# Patient Record
Sex: FEMALE | Race: WHITE | NOT HISPANIC OR LATINO | Employment: OTHER | URBAN - METROPOLITAN AREA
[De-identification: names, ages, dates, MRNs, and addresses within clinical notes are randomized per-mention and may not be internally consistent; named-entity substitution may affect disease eponyms.]

---

## 2017-01-18 ENCOUNTER — CONVERSION ENCOUNTER (OUTPATIENT)
Dept: MAMMOGRAPHY | Facility: HOSPITAL | Age: 72
End: 2017-01-18

## 2018-03-30 ENCOUNTER — CONVERSION ENCOUNTER (OUTPATIENT)
Dept: PODIATRY | Facility: CLINIC | Age: 73
End: 2018-03-30

## 2018-10-09 ENCOUNTER — CONVERSION ENCOUNTER (OUTPATIENT)
Dept: MAMMOGRAPHY | Facility: HOSPITAL | Age: 73
End: 2018-10-09

## 2019-01-11 ENCOUNTER — HOSPITAL ENCOUNTER (OUTPATIENT)
Dept: LAB | Facility: HOSPITAL | Age: 74
Discharge: HOME | End: 2019-01-11
Attending: INTERNAL MEDICINE

## 2019-01-11 LAB
APPEARANCE UR: CLEAR
BILIRUB UR QL: NEGATIVE
COLOR UR: YELLOW
CONV BACTERIA: ABNORMAL
CONV COLLECTION SOURCE (UA): ABNORMAL
CONV HYALINE CASTS IN URINE MICRO: ABNORMAL /[LPF]
CONV UROBILINOGEN IN URINE BY AUTOMATED TEST STRIP: 0.2 {EHRLICHU}/DL (ref 0.1–1)
GLUCOSE UR QL: NEGATIVE MG/DL
HGB UR QL STRIP: NEGATIVE
KETONES UR QL STRIP: ABNORMAL MG/DL
LEUKOCYTE ESTERASE UR QL STRIP: ABNORMAL
NITRITE UR QL STRIP: POSITIVE
PH UR STRIP.AUTO: 5.5 [PH] (ref 5–8)
PROT UR QL: NEGATIVE MG/DL
RBC #/AREA URNS HPF: ABNORMAL /[HPF]
SP GR UR: 1.02 (ref 1–1.03)
SQUAMOUS SPT QL MICRO: ABNORMAL /[HPF]
WBC #/AREA URNS HPF: ABNORMAL /[HPF]

## 2019-01-13 LAB
AMOXICILLIN+CLAV SUSC ISLT: 4
AMPICILLIN SUSC ISLT: >=32
AMPICILLIN+SULBAC SUSC ISLT: 16
BACTERIA UR CULT: ABNORMAL
CEFAZOLIN SUSC ISLT: <=4
CEFEPIME SUSC ISLT: <=1
CEFTAZIDIME SUSC ISLT: <=1
CEFTRIAXONE SUSC ISLT: <=1
CEFUROXIME ORAL SUSC ISLT: 4
CEFUROXIME PARENTER SUSC ISLT: 4
CIPROFLOXACIN SUSC ISLT: >=4
ERTAPENEM SUSC ISLT: <=0.5
GENTAMICIN SUSC ISLT: <=1
LEVOFLOXACIN SUSC ISLT: >=8
NITROFURANTOIN SUSC ISLT: <=16
TETRACYCLINE SUSC ISLT: <=1
TMP SMX SUSC ISLT: >=320
TOBRAMYCIN SUSC ISLT: <=1

## 2019-01-23 ENCOUNTER — HOSPITAL ENCOUNTER (OUTPATIENT)
Dept: LAB | Facility: HOSPITAL | Age: 74
Discharge: HOME | End: 2019-01-23
Attending: INTERNAL MEDICINE

## 2019-01-23 LAB
ALBUMIN SERPL-MCNC: 4.2 G/DL (ref 3.5–5)
ALBUMIN/GLOB SERPL: 1.3 {RATIO} (ref 1.4–2.6)
ALP SERPL-CCNC: 66 U/L (ref 43–160)
ALT SERPL-CCNC: 16 U/L (ref 10–40)
ANION GAP SERPL CALC-SCNC: 17 MMOL/L (ref 8–19)
AST SERPL-CCNC: 20 U/L (ref 15–50)
BASOPHILS # BLD AUTO: 0.07 10*3/UL (ref 0–0.2)
BASOPHILS NFR BLD AUTO: 1.26 % (ref 0–3)
BILIRUB SERPL-MCNC: 0.34 MG/DL (ref 0.2–1.3)
BUN SERPL-MCNC: 16 MG/DL (ref 5–25)
BUN/CREAT SERPL: 24 {RATIO} (ref 6–20)
CALCIUM SERPL-MCNC: 9.9 MG/DL (ref 8.7–10.4)
CHLORIDE SERPL-SCNC: 100 MMOL/L (ref 99–111)
CHOLEST SERPL-MCNC: 199 MG/DL (ref 107–200)
CHOLEST/HDLC SERPL: 3.4 {RATIO} (ref 3–6)
CONV CO2: 23 MMOL/L (ref 22–32)
CONV TOTAL PROTEIN: 7.5 G/DL (ref 6.3–8.2)
CREAT UR-MCNC: 0.66 MG/DL (ref 0.5–0.9)
EOSINOPHIL # BLD AUTO: 0.18 10*3/UL (ref 0–0.7)
EOSINOPHIL # BLD AUTO: 3.36 % (ref 0–7)
ERYTHROCYTE [DISTWIDTH] IN BLOOD BY AUTOMATED COUNT: 11.7 % (ref 11.5–14.5)
FOLATE SERPL-MCNC: 12.1 NG/ML (ref 4.8–20)
GFR SERPLBLD BASED ON 1.73 SQ M-ARVRAT: >60 ML/MIN/{1.73_M2}
GLOBULIN UR ELPH-MCNC: 3.3 G/DL (ref 2–3.5)
GLUCOSE SERPL-MCNC: 93 MG/DL (ref 65–99)
HBA1C MFR BLD: 13 G/DL (ref 12–16)
HCT VFR BLD AUTO: 40.3 % (ref 37–47)
HDLC SERPL-MCNC: 59 MG/DL (ref 40–60)
LDLC SERPL CALC-MCNC: 120 MG/DL (ref 70–100)
LYMPHOCYTES # BLD AUTO: 1.73 10*3/UL (ref 1–5)
MCH RBC QN AUTO: 29.1 PG (ref 27–31)
MCHC RBC AUTO-ENTMCNC: 32.1 G/DL (ref 33–37)
MCV RBC AUTO: 90.6 FL (ref 81–99)
MONOCYTES # BLD AUTO: 0.6 10*3/UL (ref 0.2–1.2)
MONOCYTES NFR BLD AUTO: 11.4 % (ref 3–10)
NEUTROPHILS # BLD AUTO: 2.72 10*3/UL (ref 2–8)
NEUTROPHILS NFR BLD AUTO: 51.4 % (ref 30–85)
NRBC BLD AUTO-RTO: 0 % (ref 0–0.01)
OSMOLALITY SERPL CALC.SUM OF ELEC: 283 MOSM/KG (ref 273–304)
PLATELET # BLD AUTO: 275 10*3/UL (ref 130–400)
PMV BLD AUTO: 7.8 FL (ref 7.4–10.4)
POTASSIUM SERPL-SCNC: 4 MMOL/L (ref 3.5–5.3)
RBC # BLD AUTO: 4.46 10*6/UL (ref 4.2–5.4)
SODIUM SERPL-SCNC: 136 MMOL/L (ref 135–147)
TRIGL SERPL-MCNC: 102 MG/DL (ref 40–150)
VARIANT LYMPHS NFR BLD MANUAL: 32.6 % (ref 20–45)
VIT B12 SERPL-MCNC: 661 PG/ML (ref 211–911)
VLDLC SERPL-MCNC: 20 MG/DL (ref 5–37)
WBC # BLD AUTO: 5.3 10*3/UL (ref 4.8–10.8)

## 2019-01-24 LAB
25(OH)D3 SERPL-MCNC: 22.4 NG/ML (ref 30–100)
FERRITIN SERPL-MCNC: 50 NG/ML (ref 10–200)
IRON SATN MFR SERPL: 20 % (ref 20–55)
IRON SERPL-MCNC: 87 UG/DL (ref 60–170)
TIBC SERPL-MCNC: 426 UG/DL (ref 245–450)
TRANSFERRIN SERPL-MCNC: 298 MG/DL (ref 250–380)

## 2019-05-17 ENCOUNTER — HOSPITAL ENCOUNTER (OUTPATIENT)
Dept: GENERAL RADIOLOGY | Facility: HOSPITAL | Age: 74
Discharge: HOME | End: 2019-05-17
Attending: INTERNAL MEDICINE

## 2019-05-29 ENCOUNTER — HOSPITAL ENCOUNTER (OUTPATIENT)
Dept: LAB | Facility: HOSPITAL | Age: 74
Discharge: HOME | End: 2019-05-29
Attending: INTERNAL MEDICINE

## 2019-05-29 LAB
25(OH)D3 SERPL-MCNC: 23.3 NG/ML (ref 30–100)
ALBUMIN SERPL-MCNC: 4 G/DL (ref 3.5–5)
ALBUMIN/GLOB SERPL: 1.4 {RATIO} (ref 1.4–2.6)
ALP SERPL-CCNC: 66 U/L (ref 43–160)
ALT SERPL-CCNC: 18 U/L (ref 10–40)
ANION GAP SERPL CALC-SCNC: 16 MMOL/L (ref 8–19)
AST SERPL-CCNC: 21 U/L (ref 15–50)
BASOPHILS # BLD AUTO: 0.06 10*3/UL (ref 0–0.2)
BASOPHILS NFR BLD AUTO: 0.9 % (ref 0–3)
BILIRUB SERPL-MCNC: 0.37 MG/DL (ref 0.2–1.3)
BUN SERPL-MCNC: 19 MG/DL (ref 5–25)
BUN/CREAT SERPL: 23 {RATIO} (ref 6–20)
CALCIUM SERPL-MCNC: 9.5 MG/DL (ref 8.7–10.4)
CHLORIDE SERPL-SCNC: 104 MMOL/L (ref 99–111)
CHOLEST SERPL-MCNC: 200 MG/DL (ref 107–200)
CHOLEST/HDLC SERPL: 2.9 {RATIO} (ref 3–6)
CONV ABS IMM GRAN: 0.03 10*3/UL (ref 0–0.2)
CONV CO2: 25 MMOL/L (ref 22–32)
CONV IMMATURE GRAN: 0.4 % (ref 0–1.8)
CONV TOTAL PROTEIN: 6.9 G/DL (ref 6.3–8.2)
CREAT UR-MCNC: 0.81 MG/DL (ref 0.5–0.9)
DEPRECATED RDW RBC AUTO: 43.8 FL (ref 36.4–46.3)
EOSINOPHIL # BLD AUTO: 0.21 10*3/UL (ref 0–0.7)
EOSINOPHIL # BLD AUTO: 3.1 % (ref 0–7)
ERYTHROCYTE [DISTWIDTH] IN BLOOD BY AUTOMATED COUNT: 13.2 % (ref 11.7–14.4)
GFR SERPLBLD BASED ON 1.73 SQ M-ARVRAT: >60 ML/MIN/{1.73_M2}
GLOBULIN UR ELPH-MCNC: 2.9 G/DL (ref 2–3.5)
GLUCOSE SERPL-MCNC: 91 MG/DL (ref 65–99)
HBA1C MFR BLD: 11.8 G/DL (ref 12–16)
HCT VFR BLD AUTO: 38.4 % (ref 37–47)
HDLC SERPL-MCNC: 70 MG/DL (ref 40–60)
LDLC SERPL CALC-MCNC: 113 MG/DL (ref 70–100)
LYMPHOCYTES # BLD AUTO: 2.09 10*3/UL (ref 1–5)
MAGNESIUM SERPL-MCNC: 2.15 MG/DL (ref 1.6–2.3)
MCH RBC QN AUTO: 27.4 PG (ref 27–31)
MCHC RBC AUTO-ENTMCNC: 30.7 G/DL (ref 33–37)
MCV RBC AUTO: 89.1 FL (ref 81–99)
MONOCYTES # BLD AUTO: 0.69 10*3/UL (ref 0.2–1.2)
MONOCYTES NFR BLD AUTO: 10.3 % (ref 3–10)
NEUTROPHILS # BLD AUTO: 3.64 10*3/UL (ref 2–8)
NEUTROPHILS NFR BLD AUTO: 54.2 % (ref 30–85)
NRBC CBCN: 0 % (ref 0–0.7)
OSMOLALITY SERPL CALC.SUM OF ELEC: 294 MOSM/KG (ref 273–304)
PLATELET # BLD AUTO: 280 10*3/UL (ref 130–400)
PMV BLD AUTO: 10.2 FL (ref 9.4–12.3)
POTASSIUM SERPL-SCNC: 4.2 MMOL/L (ref 3.5–5.3)
RBC # BLD AUTO: 4.31 10*6/UL (ref 4.2–5.4)
SODIUM SERPL-SCNC: 141 MMOL/L (ref 135–147)
T4 FREE SERPL-MCNC: 1.1 NG/DL (ref 0.9–1.8)
TRIGL SERPL-MCNC: 84 MG/DL (ref 40–150)
TSH SERPL-ACNC: 2.55 M[IU]/L (ref 0.27–4.2)
VARIANT LYMPHS NFR BLD MANUAL: 31.1 % (ref 20–45)
VLDLC SERPL-MCNC: 17 MG/DL (ref 5–37)
WBC # BLD AUTO: 6.72 10*3/UL (ref 4.8–10.8)

## 2019-08-23 ENCOUNTER — HOSPITAL ENCOUNTER (OUTPATIENT)
Dept: LAB | Facility: HOSPITAL | Age: 74
Discharge: HOME | End: 2019-08-23
Attending: INTERNAL MEDICINE

## 2019-08-23 LAB
APPEARANCE UR: ABNORMAL
BILIRUB UR QL: NEGATIVE
COLOR UR: YELLOW
CONV BACTERIA: ABNORMAL
CONV COLLECTION SOURCE (UA): ABNORMAL
CONV CRYSTALS: ABNORMAL /[HPF]
CONV UROBILINOGEN IN URINE BY AUTOMATED TEST STRIP: 0.2 {EHRLICHU}/DL (ref 0.1–1)
GLUCOSE UR QL: NEGATIVE MG/DL
HGB UR QL STRIP: NEGATIVE
KETONES UR QL STRIP: NEGATIVE MG/DL
LEUKOCYTE ESTERASE UR QL STRIP: ABNORMAL
NITRITE UR QL STRIP: POSITIVE
PH UR STRIP.AUTO: 8 [PH] (ref 5–8)
PROT UR QL: NEGATIVE MG/DL
RBC #/AREA URNS HPF: ABNORMAL /[HPF]
SP GR UR: 1.02 (ref 1–1.03)
SQUAMOUS SPT QL MICRO: ABNORMAL /[HPF]
WBC #/AREA URNS HPF: ABNORMAL /[HPF]

## 2019-08-25 LAB
AMOXICILLIN+CLAV SUSC ISLT: >=32
AMPICILLIN SUSC ISLT: >=32
AMPICILLIN+SULBAC SUSC ISLT: >=32
BACTERIA UR CULT: ABNORMAL
CEFAZOLIN SUSC ISLT: 8
CEFEPIME SUSC ISLT: <=1
CEFTAZIDIME SUSC ISLT: <=1
CEFTRIAXONE SUSC ISLT: <=1
CEFUROXIME ORAL SUSC ISLT: 4
CEFUROXIME PARENTER SUSC ISLT: 4
CIPROFLOXACIN SUSC ISLT: >=4
ERTAPENEM SUSC ISLT: <=0.5
GENTAMICIN SUSC ISLT: <=1
LEVOFLOXACIN SUSC ISLT: >=8
NITROFURANTOIN SUSC ISLT: <=16
TETRACYCLINE SUSC ISLT: <=1
TMP SMX SUSC ISLT: >=320
TOBRAMYCIN SUSC ISLT: <=1

## 2019-10-16 ENCOUNTER — HOSPITAL ENCOUNTER (OUTPATIENT)
Dept: GENERAL RADIOLOGY | Facility: HOSPITAL | Age: 74
Discharge: HOME | End: 2019-10-16
Attending: INTERNAL MEDICINE

## 2019-10-16 ENCOUNTER — HOSPITAL ENCOUNTER (OUTPATIENT)
Dept: LAB | Facility: HOSPITAL | Age: 74
Discharge: HOME | End: 2019-10-16
Attending: INTERNAL MEDICINE

## 2019-10-16 LAB
25(OH)D3 SERPL-MCNC: 22.6 NG/ML (ref 30–100)
ALBUMIN SERPL-MCNC: 4.2 G/DL (ref 3.5–5)
ALBUMIN/GLOB SERPL: 1.5 {RATIO} (ref 1.4–2.6)
ALP SERPL-CCNC: 70 U/L (ref 43–160)
ALT SERPL-CCNC: 16 U/L (ref 10–40)
ANION GAP SERPL CALC-SCNC: 17 MMOL/L (ref 8–19)
AST SERPL-CCNC: 21 U/L (ref 15–50)
BASOPHILS # BLD AUTO: 0.06 10*3/UL (ref 0–0.2)
BASOPHILS NFR BLD AUTO: 1 % (ref 0–3)
BILIRUB SERPL-MCNC: 0.42 MG/DL (ref 0.2–1.3)
BUN SERPL-MCNC: 19 MG/DL (ref 5–25)
BUN/CREAT SERPL: 28 {RATIO} (ref 6–20)
CALCIUM SERPL-MCNC: 9.9 MG/DL (ref 8.7–10.4)
CHLORIDE SERPL-SCNC: 103 MMOL/L (ref 99–111)
CHOLEST SERPL-MCNC: 195 MG/DL (ref 107–200)
CHOLEST/HDLC SERPL: 3 {RATIO} (ref 3–6)
CONV ABS IMM GRAN: 0.01 10*3/UL (ref 0–0.2)
CONV CO2: 24 MMOL/L (ref 22–32)
CONV IMMATURE GRAN: 0.2 % (ref 0–1.8)
CONV TOTAL PROTEIN: 7 G/DL (ref 6.3–8.2)
CREAT UR-MCNC: 0.68 MG/DL (ref 0.5–0.9)
DEPRECATED RDW RBC AUTO: 44.5 FL (ref 36.4–46.3)
EOSINOPHIL # BLD AUTO: 0.38 10*3/UL (ref 0–0.7)
EOSINOPHIL # BLD AUTO: 6.3 % (ref 0–7)
ERYTHROCYTE [DISTWIDTH] IN BLOOD BY AUTOMATED COUNT: 13.8 % (ref 11.7–14.4)
FERRITIN SERPL-MCNC: 15 NG/ML (ref 10–200)
FOLATE SERPL-MCNC: 8.1 NG/ML (ref 4.8–20)
GFR SERPLBLD BASED ON 1.73 SQ M-ARVRAT: >60 ML/MIN/{1.73_M2}
GLOBULIN UR ELPH-MCNC: 2.8 G/DL (ref 2–3.5)
GLUCOSE SERPL-MCNC: 90 MG/DL (ref 65–99)
HCT VFR BLD AUTO: 37.7 % (ref 37–47)
HDLC SERPL-MCNC: 65 MG/DL (ref 40–60)
HGB BLD-MCNC: 11.4 G/DL (ref 12–16)
IRON SATN MFR SERPL: 21 % (ref 20–55)
IRON SERPL-MCNC: 91 UG/DL (ref 60–170)
LDLC SERPL CALC-MCNC: 110 MG/DL (ref 70–100)
LYMPHOCYTES # BLD AUTO: 2.05 10*3/UL (ref 1–5)
LYMPHOCYTES NFR BLD AUTO: 33.8 % (ref 20–45)
MAGNESIUM SERPL-MCNC: 1.81 MG/DL (ref 1.6–2.3)
MCH RBC QN AUTO: 26.6 PG (ref 27–31)
MCHC RBC AUTO-ENTMCNC: 30.2 G/DL (ref 33–37)
MCV RBC AUTO: 87.9 FL (ref 81–99)
MONOCYTES # BLD AUTO: 0.7 10*3/UL (ref 0.2–1.2)
MONOCYTES NFR BLD AUTO: 11.6 % (ref 3–10)
NEUTROPHILS # BLD AUTO: 2.86 10*3/UL (ref 2–8)
NEUTROPHILS NFR BLD AUTO: 47.1 % (ref 30–85)
NRBC CBCN: 0 % (ref 0–0.7)
OSMOLALITY SERPL CALC.SUM OF ELEC: 292 MOSM/KG (ref 273–304)
PLATELET # BLD AUTO: 254 10*3/UL (ref 130–400)
PMV BLD AUTO: 10.9 FL (ref 9.4–12.3)
POTASSIUM SERPL-SCNC: 3.9 MMOL/L (ref 3.5–5.3)
RBC # BLD AUTO: 4.29 10*6/UL (ref 4.2–5.4)
SODIUM SERPL-SCNC: 140 MMOL/L (ref 135–147)
T4 FREE SERPL-MCNC: 1.1 NG/DL (ref 0.9–1.8)
TIBC SERPL-MCNC: 438 UG/DL (ref 245–450)
TRANSFERRIN SERPL-MCNC: 306 MG/DL (ref 250–380)
TRIGL SERPL-MCNC: 98 MG/DL (ref 40–150)
TSH SERPL-ACNC: 2.06 M[IU]/L (ref 0.27–4.2)
VIT B12 SERPL-MCNC: 498 PG/ML (ref 211–911)
VLDLC SERPL-MCNC: 20 MG/DL (ref 5–37)
WBC # BLD AUTO: 6.06 10*3/UL (ref 4.8–10.8)

## 2019-11-20 ENCOUNTER — HOSPITAL ENCOUNTER (OUTPATIENT)
Dept: GENERAL RADIOLOGY | Facility: HOSPITAL | Age: 74
Discharge: HOME | End: 2019-11-20
Attending: INTERNAL MEDICINE

## 2019-12-13 ENCOUNTER — HOSPITAL ENCOUNTER (OUTPATIENT)
Dept: MAMMOGRAPHY | Facility: HOSPITAL | Age: 74
Discharge: HOME | End: 2019-12-13
Attending: INTERNAL MEDICINE

## 2020-01-10 ENCOUNTER — HOSPITAL ENCOUNTER (OUTPATIENT)
Dept: LAB | Facility: HOSPITAL | Age: 75
Discharge: HOME | End: 2020-01-10
Attending: INTERNAL MEDICINE

## 2020-01-10 ENCOUNTER — PROCEDURE VISIT CONVERTED (OUTPATIENT)
Dept: PODIATRY | Facility: CLINIC | Age: 75
End: 2020-01-10
Attending: PODIATRIST

## 2020-01-10 LAB
APPEARANCE UR: ABNORMAL
BILIRUB UR QL: NEGATIVE
COLOR UR: YELLOW
CONV BACTERIA: ABNORMAL
CONV COLLECTION SOURCE (UA): ABNORMAL
CONV HYALINE CASTS IN URINE MICRO: ABNORMAL /[LPF]
CONV UROBILINOGEN IN URINE BY AUTOMATED TEST STRIP: 0.2 {EHRLICHU}/DL (ref 0.1–1)
GLUCOSE UR QL: NEGATIVE MG/DL
HGB UR QL STRIP: NEGATIVE
KETONES UR QL STRIP: NEGATIVE MG/DL
LEUKOCYTE ESTERASE UR QL STRIP: ABNORMAL
NITRITE UR QL STRIP: POSITIVE
PH UR STRIP.AUTO: 6.5 [PH] (ref 5–8)
PROT UR QL: NEGATIVE MG/DL
RBC #/AREA URNS HPF: ABNORMAL /[HPF]
SP GR UR: 1.01 (ref 1–1.03)
SQUAMOUS SPT QL MICRO: ABNORMAL /[HPF]
WBC #/AREA URNS HPF: ABNORMAL /[HPF]

## 2020-01-12 LAB
AMOXICILLIN+CLAV SUSC ISLT: 16
AMPICILLIN SUSC ISLT: >=32
AMPICILLIN+SULBAC SUSC ISLT: >=32
BACTERIA UR CULT: ABNORMAL
CEFAZOLIN SUSC ISLT: <=4
CEFEPIME SUSC ISLT: <=1
CEFTAZIDIME SUSC ISLT: <=1
CEFTRIAXONE SUSC ISLT: <=1
CEFUROXIME ORAL SUSC ISLT: 4
CEFUROXIME PARENTER SUSC ISLT: 4
CIPROFLOXACIN SUSC ISLT: >=4
ERTAPENEM SUSC ISLT: <=0.5
GENTAMICIN SUSC ISLT: <=1
LEVOFLOXACIN SUSC ISLT: >=8
NITROFURANTOIN SUSC ISLT: <=16
TETRACYCLINE SUSC ISLT: <=1
TMP SMX SUSC ISLT: <=20
TOBRAMYCIN SUSC ISLT: <=1

## 2020-01-31 ENCOUNTER — OFFICE VISIT CONVERTED (OUTPATIENT)
Dept: PODIATRY | Facility: CLINIC | Age: 75
End: 2020-01-31
Attending: PODIATRIST

## 2020-02-04 ENCOUNTER — OFFICE VISIT CONVERTED (OUTPATIENT)
Dept: SURGERY | Facility: CLINIC | Age: 75
End: 2020-02-04
Attending: SURGERY

## 2020-02-05 ENCOUNTER — HOSPITAL ENCOUNTER (OUTPATIENT)
Dept: LAB | Facility: HOSPITAL | Age: 75
Discharge: HOME | End: 2020-02-05
Attending: INTERNAL MEDICINE

## 2020-02-05 LAB
25(OH)D3 SERPL-MCNC: 24.7 NG/ML (ref 30–100)
ALBUMIN SERPL-MCNC: 4.1 G/DL (ref 3.5–5)
ALBUMIN/GLOB SERPL: 1.4 {RATIO} (ref 1.4–2.6)
ALP SERPL-CCNC: 73 U/L (ref 43–160)
ALT SERPL-CCNC: 17 U/L (ref 10–40)
ANION GAP SERPL CALC-SCNC: 17 MMOL/L (ref 8–19)
AST SERPL-CCNC: 25 U/L (ref 15–50)
BASOPHILS # BLD AUTO: 0.08 10*3/UL (ref 0–0.2)
BASOPHILS NFR BLD AUTO: 1.2 % (ref 0–3)
BILIRUB SERPL-MCNC: 0.32 MG/DL (ref 0.2–1.3)
BUN SERPL-MCNC: 17 MG/DL (ref 5–25)
BUN/CREAT SERPL: 20 {RATIO} (ref 6–20)
CALCIUM SERPL-MCNC: 9.9 MG/DL (ref 8.7–10.4)
CHLORIDE SERPL-SCNC: 98 MMOL/L (ref 99–111)
CHOLEST SERPL-MCNC: 200 MG/DL (ref 107–200)
CHOLEST/HDLC SERPL: 3.3 {RATIO} (ref 3–6)
CONV ABS IMM GRAN: 0.01 10*3/UL (ref 0–0.2)
CONV CO2: 25 MMOL/L (ref 22–32)
CONV IMMATURE GRAN: 0.1 % (ref 0–1.8)
CONV TOTAL PROTEIN: 7 G/DL (ref 6.3–8.2)
CREAT UR-MCNC: 0.86 MG/DL (ref 0.5–0.9)
DEPRECATED RDW RBC AUTO: 45.5 FL (ref 36.4–46.3)
EOSINOPHIL # BLD AUTO: 0.23 10*3/UL (ref 0–0.7)
EOSINOPHIL # BLD AUTO: 3.4 % (ref 0–7)
ERYTHROCYTE [DISTWIDTH] IN BLOOD BY AUTOMATED COUNT: 14.5 % (ref 11.7–14.4)
FERRITIN SERPL-MCNC: 18 NG/ML (ref 10–200)
FOLATE SERPL-MCNC: 5.5 NG/ML (ref 4.8–20)
GFR SERPLBLD BASED ON 1.73 SQ M-ARVRAT: >60 ML/MIN/{1.73_M2}
GLOBULIN UR ELPH-MCNC: 2.9 G/DL (ref 2–3.5)
GLUCOSE SERPL-MCNC: 97 MG/DL (ref 65–99)
HCT VFR BLD AUTO: 37.6 % (ref 37–47)
HDLC SERPL-MCNC: 61 MG/DL (ref 40–60)
HGB BLD-MCNC: 11.4 G/DL (ref 12–16)
IRON SATN MFR SERPL: 11 % (ref 20–55)
IRON SERPL-MCNC: 53 UG/DL (ref 60–170)
LDLC SERPL CALC-MCNC: 122 MG/DL (ref 70–100)
LYMPHOCYTES # BLD AUTO: 2.3 10*3/UL (ref 1–5)
LYMPHOCYTES NFR BLD AUTO: 34.2 % (ref 20–45)
MAGNESIUM SERPL-MCNC: 1.99 MG/DL (ref 1.6–2.3)
MCH RBC QN AUTO: 25.9 PG (ref 27–31)
MCHC RBC AUTO-ENTMCNC: 30.3 G/DL (ref 33–37)
MCV RBC AUTO: 85.5 FL (ref 81–99)
MONOCYTES # BLD AUTO: 0.76 10*3/UL (ref 0.2–1.2)
MONOCYTES NFR BLD AUTO: 11.3 % (ref 3–10)
NEUTROPHILS # BLD AUTO: 3.35 10*3/UL (ref 2–8)
NEUTROPHILS NFR BLD AUTO: 49.8 % (ref 30–85)
NRBC CBCN: 0 % (ref 0–0.7)
OSMOLALITY SERPL CALC.SUM OF ELEC: 283 MOSM/KG (ref 273–304)
PLATELET # BLD AUTO: 289 10*3/UL (ref 130–400)
PMV BLD AUTO: 10.2 FL (ref 9.4–12.3)
POTASSIUM SERPL-SCNC: 4.2 MMOL/L (ref 3.5–5.3)
RBC # BLD AUTO: 4.4 10*6/UL (ref 4.2–5.4)
SODIUM SERPL-SCNC: 136 MMOL/L (ref 135–147)
TIBC SERPL-MCNC: 499 UG/DL (ref 245–450)
TRANSFERRIN SERPL-MCNC: 349 MG/DL (ref 250–380)
TRIGL SERPL-MCNC: 87 MG/DL (ref 40–150)
VIT B12 SERPL-MCNC: 649 PG/ML (ref 211–911)
VLDLC SERPL-MCNC: 17 MG/DL (ref 5–37)
WBC # BLD AUTO: 6.73 10*3/UL (ref 4.8–10.8)

## 2020-02-12 ENCOUNTER — HOSPITAL ENCOUNTER (OUTPATIENT)
Dept: LAB | Facility: HOSPITAL | Age: 75
Discharge: HOME | End: 2020-02-12
Attending: INTERNAL MEDICINE

## 2020-02-12 LAB
APPEARANCE UR: CLEAR
BILIRUB UR QL: NEGATIVE
COLOR UR: YELLOW
CONV BACTERIA: ABNORMAL
CONV COLLECTION SOURCE (UA): ABNORMAL
CONV HYALINE CASTS IN URINE MICRO: ABNORMAL /[LPF]
CONV UROBILINOGEN IN URINE BY AUTOMATED TEST STRIP: 0.2 {EHRLICHU}/DL (ref 0.1–1)
GLUCOSE UR QL: NEGATIVE MG/DL
HGB UR QL STRIP: NEGATIVE
KETONES UR QL STRIP: NEGATIVE MG/DL
LEUKOCYTE ESTERASE UR QL STRIP: ABNORMAL
NITRITE UR QL STRIP: POSITIVE
PH UR STRIP.AUTO: 5.5 [PH] (ref 5–8)
PROT UR QL: NEGATIVE MG/DL
RBC #/AREA URNS HPF: ABNORMAL /[HPF]
SP GR UR: 1.02 (ref 1–1.03)
SQUAMOUS SPT QL MICRO: ABNORMAL /[HPF]
WBC #/AREA URNS HPF: ABNORMAL /[HPF]

## 2020-02-13 ENCOUNTER — HOSPITAL ENCOUNTER (OUTPATIENT)
Dept: INFUSION THERAPY | Facility: HOSPITAL | Age: 75
Setting detail: RECURRING SERIES
Discharge: HOME | End: 2020-02-13
Attending: INTERNAL MEDICINE

## 2020-02-13 ENCOUNTER — HOSPITAL ENCOUNTER (OUTPATIENT)
Dept: GENERAL RADIOLOGY | Facility: HOSPITAL | Age: 75
Discharge: HOME | End: 2020-02-13
Attending: INTERNAL MEDICINE

## 2020-03-06 ENCOUNTER — PROCEDURE VISIT CONVERTED (OUTPATIENT)
Dept: PODIATRY | Facility: CLINIC | Age: 75
End: 2020-03-06
Attending: PODIATRIST

## 2020-03-13 ENCOUNTER — HOSPITAL ENCOUNTER (OUTPATIENT)
Dept: SURGERY | Facility: HOSPITAL | Age: 75
Setting detail: HOSPITAL OUTPATIENT SURGERY
Discharge: HOME | End: 2020-03-13
Attending: SURGERY

## 2020-04-24 ENCOUNTER — HOSPITAL ENCOUNTER (OUTPATIENT)
Dept: LAB | Facility: HOSPITAL | Age: 75
Discharge: HOME | End: 2020-04-24
Attending: INTERNAL MEDICINE

## 2020-04-24 LAB
APPEARANCE UR: CLEAR
BILIRUB UR QL: NEGATIVE
COLOR UR: YELLOW
CONV BACTERIA: ABNORMAL
CONV COLLECTION SOURCE (UA): ABNORMAL
CONV HYALINE CASTS IN URINE MICRO: ABNORMAL /[LPF]
CONV UROBILINOGEN IN URINE BY AUTOMATED TEST STRIP: 1 {EHRLICHU}/DL (ref 0.1–1)
GLUCOSE UR QL: NEGATIVE MG/DL
HGB UR QL STRIP: NEGATIVE
KETONES UR QL STRIP: NEGATIVE MG/DL
LEUKOCYTE ESTERASE UR QL STRIP: ABNORMAL
NITRITE UR QL STRIP: POSITIVE
PH UR STRIP.AUTO: 5.5 [PH] (ref 5–8)
PROT UR QL: NEGATIVE MG/DL
RBC #/AREA URNS HPF: ABNORMAL /[HPF]
SP GR UR: 1.02 (ref 1–1.03)
SQUAMOUS SPT QL MICRO: ABNORMAL /[HPF]
WBC #/AREA URNS HPF: ABNORMAL /[HPF]

## 2020-06-04 ENCOUNTER — HOSPITAL ENCOUNTER (OUTPATIENT)
Dept: LAB | Facility: HOSPITAL | Age: 75
Discharge: HOME | End: 2020-06-04
Attending: INTERNAL MEDICINE

## 2020-06-04 LAB
25(OH)D3 SERPL-MCNC: 24.2 NG/ML (ref 30–100)
ALBUMIN SERPL-MCNC: 4.3 G/DL (ref 3.5–5)
ALBUMIN/GLOB SERPL: 1.5 {RATIO} (ref 1.4–2.6)
ALP SERPL-CCNC: 77 U/L (ref 43–160)
ALT SERPL-CCNC: 18 U/L (ref 10–40)
ANION GAP SERPL CALC-SCNC: 15 MMOL/L (ref 8–19)
AST SERPL-CCNC: 23 U/L (ref 15–50)
BASOPHILS # BLD AUTO: 0.05 10*3/UL (ref 0–0.2)
BASOPHILS NFR BLD AUTO: 0.9 % (ref 0–3)
BILIRUB SERPL-MCNC: 0.43 MG/DL (ref 0.2–1.3)
BUN SERPL-MCNC: 16 MG/DL (ref 5–25)
BUN/CREAT SERPL: 22 {RATIO} (ref 6–20)
CALCIUM SERPL-MCNC: 9.6 MG/DL (ref 8.7–10.4)
CHLORIDE SERPL-SCNC: 100 MMOL/L (ref 99–111)
CHOLEST SERPL-MCNC: 185 MG/DL (ref 107–200)
CHOLEST/HDLC SERPL: 2.9 {RATIO} (ref 3–6)
CONV ABS IMM GRAN: 0.01 10*3/UL (ref 0–0.2)
CONV CO2: 26 MMOL/L (ref 22–32)
CONV IMMATURE GRAN: 0.2 % (ref 0–1.8)
CONV TOTAL PROTEIN: 7.2 G/DL (ref 6.3–8.2)
CREAT UR-MCNC: 0.72 MG/DL (ref 0.5–0.9)
DEPRECATED RDW RBC AUTO: 43.9 FL (ref 36.4–46.3)
EOSINOPHIL # BLD AUTO: 0.25 10*3/UL (ref 0–0.7)
EOSINOPHIL # BLD AUTO: 4.3 % (ref 0–7)
ERYTHROCYTE [DISTWIDTH] IN BLOOD BY AUTOMATED COUNT: 13.2 % (ref 11.7–14.4)
FERRITIN SERPL-MCNC: 141 NG/ML (ref 10–200)
FOLATE SERPL-MCNC: 11.5 NG/ML (ref 4.8–20)
GFR SERPLBLD BASED ON 1.73 SQ M-ARVRAT: >60 ML/MIN/{1.73_M2}
GLOBULIN UR ELPH-MCNC: 2.9 G/DL (ref 2–3.5)
GLUCOSE SERPL-MCNC: 87 MG/DL (ref 65–99)
HCT VFR BLD AUTO: 38.7 % (ref 37–47)
HDLC SERPL-MCNC: 63 MG/DL (ref 40–60)
HGB BLD-MCNC: 12.2 G/DL (ref 12–16)
IRON SATN MFR SERPL: 31 % (ref 20–55)
IRON SERPL-MCNC: 107 UG/DL (ref 60–170)
LDLC SERPL CALC-MCNC: 105 MG/DL (ref 70–100)
LYMPHOCYTES # BLD AUTO: 1.84 10*3/UL (ref 1–5)
LYMPHOCYTES NFR BLD AUTO: 31.6 % (ref 20–45)
MAGNESIUM SERPL-MCNC: 1.97 MG/DL (ref 1.6–2.3)
MCH RBC QN AUTO: 28.5 PG (ref 27–31)
MCHC RBC AUTO-ENTMCNC: 31.5 G/DL (ref 33–37)
MCV RBC AUTO: 90.4 FL (ref 81–99)
MONOCYTES # BLD AUTO: 0.65 10*3/UL (ref 0.2–1.2)
MONOCYTES NFR BLD AUTO: 11.2 % (ref 3–10)
NEUTROPHILS # BLD AUTO: 3.02 10*3/UL (ref 2–8)
NEUTROPHILS NFR BLD AUTO: 51.8 % (ref 30–85)
NRBC CBCN: 0 % (ref 0–0.7)
OSMOLALITY SERPL CALC.SUM OF ELEC: 285 MOSM/KG (ref 273–304)
PLATELET # BLD AUTO: 242 10*3/UL (ref 130–400)
PMV BLD AUTO: 10.9 FL (ref 9.4–12.3)
POTASSIUM SERPL-SCNC: 3.8 MMOL/L (ref 3.5–5.3)
RBC # BLD AUTO: 4.28 10*6/UL (ref 4.2–5.4)
SODIUM SERPL-SCNC: 137 MMOL/L (ref 135–147)
TIBC SERPL-MCNC: 345 UG/DL (ref 245–450)
TRANSFERRIN SERPL-MCNC: 241 MG/DL (ref 250–380)
TRIGL SERPL-MCNC: 83 MG/DL (ref 40–150)
VIT B12 SERPL-MCNC: 471 PG/ML (ref 211–911)
VLDLC SERPL-MCNC: 17 MG/DL (ref 5–37)
WBC # BLD AUTO: 5.82 10*3/UL (ref 4.8–10.8)

## 2020-06-08 ENCOUNTER — HOSPITAL ENCOUNTER (OUTPATIENT)
Dept: LAB | Facility: HOSPITAL | Age: 75
Discharge: HOME | End: 2020-06-08
Attending: INTERNAL MEDICINE

## 2020-06-08 LAB
APPEARANCE UR: CLEAR
BILIRUB UR QL: NEGATIVE
COLOR UR: YELLOW
CONV BACTERIA: ABNORMAL
CONV COLLECTION SOURCE (UA): ABNORMAL
CONV UROBILINOGEN IN URINE BY AUTOMATED TEST STRIP: 0.2 {EHRLICHU}/DL (ref 0.1–1)
GLUCOSE UR QL: NEGATIVE MG/DL
HGB UR QL STRIP: NEGATIVE
KETONES UR QL STRIP: NEGATIVE MG/DL
LEUKOCYTE ESTERASE UR QL STRIP: ABNORMAL
NITRITE UR QL STRIP: POSITIVE
PH UR STRIP.AUTO: 7 [PH] (ref 5–8)
PROT UR QL: NEGATIVE MG/DL
RBC #/AREA URNS HPF: ABNORMAL /[HPF]
SP GR UR: 1.01 (ref 1–1.03)
WBC #/AREA URNS HPF: ABNORMAL /[HPF]

## 2020-06-10 LAB
AMOXICILLIN+CLAV SUSC ISLT: 16
AMPICILLIN SUSC ISLT: >=32
AMPICILLIN+SULBAC SUSC ISLT: >=32
BACTERIA UR CULT: ABNORMAL
CEFAZOLIN SUSC ISLT: 8
CEFEPIME SUSC ISLT: <=1
CEFTAZIDIME SUSC ISLT: <=1
CEFTRIAXONE SUSC ISLT: <=1
CEFUROXIME ORAL SUSC ISLT: 8
CEFUROXIME PARENTER SUSC ISLT: 8
CIPROFLOXACIN SUSC ISLT: >=4
ERTAPENEM SUSC ISLT: <=0.5
GENTAMICIN SUSC ISLT: <=1
LEVOFLOXACIN SUSC ISLT: >=8
NITROFURANTOIN SUSC ISLT: <=16
TETRACYCLINE SUSC ISLT: <=1
TMP SMX SUSC ISLT: >=320
TOBRAMYCIN SUSC ISLT: <=1

## 2020-06-19 ENCOUNTER — PROCEDURE VISIT CONVERTED (OUTPATIENT)
Dept: PODIATRY | Facility: CLINIC | Age: 75
End: 2020-06-19
Attending: PODIATRIST

## 2020-07-10 ENCOUNTER — HOSPITAL ENCOUNTER (OUTPATIENT)
Dept: OTHER | Facility: HOSPITAL | Age: 75
Discharge: HOME | End: 2020-07-10
Attending: INTERNAL MEDICINE

## 2020-07-10 LAB
APPEARANCE UR: CLEAR
BILIRUB UR QL: NEGATIVE
COLOR UR: YELLOW
CONV BACTERIA: ABNORMAL
CONV COLLECTION SOURCE (UA): ABNORMAL
CONV UROBILINOGEN IN URINE BY AUTOMATED TEST STRIP: 0.2 {EHRLICHU}/DL (ref 0.1–1)
GLUCOSE UR QL: NEGATIVE MG/DL
HGB UR QL STRIP: NEGATIVE
KETONES UR QL STRIP: NEGATIVE MG/DL
LEUKOCYTE ESTERASE UR QL STRIP: ABNORMAL
NITRITE UR QL STRIP: NEGATIVE
PH UR STRIP.AUTO: 6 [PH] (ref 5–8)
PROT UR QL: NEGATIVE MG/DL
RBC #/AREA URNS HPF: ABNORMAL /[HPF]
SP GR UR: <=1.005 (ref 1–1.03)
SQUAMOUS SPT QL MICRO: ABNORMAL /[HPF]
WBC #/AREA URNS HPF: ABNORMAL /[HPF]

## 2020-08-20 ENCOUNTER — PROCEDURE VISIT CONVERTED (OUTPATIENT)
Dept: PODIATRY | Facility: CLINIC | Age: 75
End: 2020-08-20
Attending: PODIATRIST

## 2020-08-31 ENCOUNTER — HOSPITAL ENCOUNTER (OUTPATIENT)
Dept: LAB | Facility: HOSPITAL | Age: 75
Discharge: HOME | End: 2020-08-31
Attending: INTERNAL MEDICINE

## 2020-08-31 LAB
25(OH)D3 SERPL-MCNC: 30.6 NG/ML (ref 30–100)
ALBUMIN SERPL-MCNC: 4.3 G/DL (ref 3.5–5)
ALBUMIN/GLOB SERPL: 1.4 {RATIO} (ref 1.4–2.6)
ALP SERPL-CCNC: 74 U/L (ref 43–160)
ALT SERPL-CCNC: 20 U/L (ref 10–40)
ANION GAP SERPL CALC-SCNC: 15 MMOL/L (ref 8–19)
APPEARANCE UR: CLEAR
AST SERPL-CCNC: 26 U/L (ref 15–50)
BASOPHILS # BLD AUTO: 0.07 10*3/UL (ref 0–0.2)
BASOPHILS NFR BLD AUTO: 1 % (ref 0–3)
BILIRUB SERPL-MCNC: 0.38 MG/DL (ref 0.2–1.3)
BILIRUB UR QL: NEGATIVE
BUN SERPL-MCNC: 16 MG/DL (ref 5–25)
BUN/CREAT SERPL: 19 {RATIO} (ref 6–20)
CALCIUM SERPL-MCNC: 10.3 MG/DL (ref 8.7–10.4)
CHLORIDE SERPL-SCNC: 103 MMOL/L (ref 99–111)
CHOLEST SERPL-MCNC: 216 MG/DL (ref 107–200)
CHOLEST/HDLC SERPL: 3.3 {RATIO} (ref 3–6)
COLOR UR: YELLOW
CONV ABS IMM GRAN: 0.02 10*3/UL (ref 0–0.2)
CONV BACTERIA: ABNORMAL
CONV CO2: 25 MMOL/L (ref 22–32)
CONV COLLECTION SOURCE (UA): ABNORMAL
CONV HYALINE CASTS IN URINE MICRO: ABNORMAL /[LPF]
CONV IMMATURE GRAN: 0.3 % (ref 0–1.8)
CONV TOTAL PROTEIN: 7.3 G/DL (ref 6.3–8.2)
CONV UROBILINOGEN IN URINE BY AUTOMATED TEST STRIP: 0.2 {EHRLICHU}/DL (ref 0.1–1)
CREAT UR-MCNC: 0.84 MG/DL (ref 0.5–0.9)
DEPRECATED RDW RBC AUTO: 43.4 FL (ref 36.4–46.3)
EOSINOPHIL # BLD AUTO: 0.37 10*3/UL (ref 0–0.7)
EOSINOPHIL # BLD AUTO: 5.3 % (ref 0–7)
ERYTHROCYTE [DISTWIDTH] IN BLOOD BY AUTOMATED COUNT: 12.8 % (ref 11.7–14.4)
FERRITIN SERPL-MCNC: 98 NG/ML (ref 10–200)
FOLATE SERPL-MCNC: 10 NG/ML (ref 4.8–20)
GFR SERPLBLD BASED ON 1.73 SQ M-ARVRAT: >60 ML/MIN/{1.73_M2}
GLOBULIN UR ELPH-MCNC: 3 G/DL (ref 2–3.5)
GLUCOSE SERPL-MCNC: 98 MG/DL (ref 65–99)
GLUCOSE UR QL: NEGATIVE MG/DL
HCT VFR BLD AUTO: 40.1 % (ref 37–47)
HDLC SERPL-MCNC: 65 MG/DL (ref 40–60)
HGB BLD-MCNC: 12.7 G/DL (ref 12–16)
HGB UR QL STRIP: NEGATIVE
IRON SATN MFR SERPL: 22 % (ref 20–55)
IRON SERPL-MCNC: 81 UG/DL (ref 60–170)
KETONES UR QL STRIP: NEGATIVE MG/DL
LDLC SERPL CALC-MCNC: 128 MG/DL (ref 70–100)
LEUKOCYTE ESTERASE UR QL STRIP: ABNORMAL
LYMPHOCYTES # BLD AUTO: 2.54 10*3/UL (ref 1–5)
LYMPHOCYTES NFR BLD AUTO: 36.2 % (ref 20–45)
MAGNESIUM SERPL-MCNC: 2.08 MG/DL (ref 1.6–2.3)
MCH RBC QN AUTO: 29 PG (ref 27–31)
MCHC RBC AUTO-ENTMCNC: 31.7 G/DL (ref 33–37)
MCV RBC AUTO: 91.6 FL (ref 81–99)
MONOCYTES # BLD AUTO: 0.8 10*3/UL (ref 0.2–1.2)
MONOCYTES NFR BLD AUTO: 11.4 % (ref 3–10)
NEUTROPHILS # BLD AUTO: 3.22 10*3/UL (ref 2–8)
NEUTROPHILS NFR BLD AUTO: 45.8 % (ref 30–85)
NITRITE UR QL STRIP: POSITIVE
NRBC CBCN: 0 % (ref 0–0.7)
OSMOLALITY SERPL CALC.SUM OF ELEC: 289 MOSM/KG (ref 273–304)
PH UR STRIP.AUTO: 5.5 [PH] (ref 5–8)
PLATELET # BLD AUTO: 268 10*3/UL (ref 130–400)
PMV BLD AUTO: 10.2 FL (ref 9.4–12.3)
POTASSIUM SERPL-SCNC: 4.4 MMOL/L (ref 3.5–5.3)
PROT UR QL: NEGATIVE MG/DL
RBC # BLD AUTO: 4.38 10*6/UL (ref 4.2–5.4)
RBC #/AREA URNS HPF: ABNORMAL /[HPF]
SODIUM SERPL-SCNC: 139 MMOL/L (ref 135–147)
SP GR UR: 1.01 (ref 1–1.03)
TIBC SERPL-MCNC: 370 UG/DL (ref 245–450)
TRANSFERRIN SERPL-MCNC: 259 MG/DL (ref 250–380)
TRIGL SERPL-MCNC: 116 MG/DL (ref 40–150)
VIT B12 SERPL-MCNC: 595 PG/ML (ref 211–911)
VLDLC SERPL-MCNC: 23 MG/DL (ref 5–37)
WBC # BLD AUTO: 7.02 10*3/UL (ref 4.8–10.8)
WBC #/AREA URNS HPF: ABNORMAL /[HPF]

## 2020-09-02 LAB
AMPICILLIN SUSC ISLT: >=32
AMPICILLIN+SULBAC SUSC ISLT: >=32
BACTERIA UR CULT: ABNORMAL
CEFAZOLIN SUSC ISLT: <=4
CEFEPIME SUSC ISLT: <=0.12
CEFTAZIDIME SUSC ISLT: <=1
CEFTRIAXONE SUSC ISLT: <=0.25
CIPROFLOXACIN SUSC ISLT: >=4
ERTAPENEM SUSC ISLT: <=0.12
GENTAMICIN SUSC ISLT: <=1
LEVOFLOXACIN SUSC ISLT: >=8
NITROFURANTOIN SUSC ISLT: <=16
PIP+TAZO SUSC ISLT: <=4
TMP SMX SUSC ISLT: >=320
TOBRAMYCIN SUSC ISLT: <=1

## 2020-11-11 ENCOUNTER — PROCEDURE VISIT CONVERTED (OUTPATIENT)
Dept: PODIATRY | Facility: CLINIC | Age: 75
End: 2020-11-11
Attending: PODIATRIST

## 2020-11-16 ENCOUNTER — HOSPITAL ENCOUNTER (OUTPATIENT)
Dept: LAB | Facility: HOSPITAL | Age: 75
Discharge: HOME | End: 2020-11-16
Attending: INTERNAL MEDICINE

## 2020-11-16 LAB
APPEARANCE UR: ABNORMAL
BILIRUB UR QL: NEGATIVE
COLOR UR: YELLOW
CONV BACTERIA: ABNORMAL
CONV COLLECTION SOURCE (UA): ABNORMAL
CONV UROBILINOGEN IN URINE BY AUTOMATED TEST STRIP: 0.2 {EHRLICHU}/DL (ref 0.1–1)
GLUCOSE UR QL: NEGATIVE MG/DL
HGB UR QL STRIP: NEGATIVE
KETONES UR QL STRIP: NEGATIVE MG/DL
LEUKOCYTE ESTERASE UR QL STRIP: ABNORMAL
NITRITE UR QL STRIP: POSITIVE
PH UR STRIP.AUTO: 7 [PH] (ref 5–8)
PROT UR QL: NEGATIVE MG/DL
RBC #/AREA URNS HPF: ABNORMAL /[HPF]
SP GR UR: 1.01 (ref 1–1.03)
SQUAMOUS SPT QL MICRO: ABNORMAL /[HPF]
WBC #/AREA URNS HPF: ABNORMAL /[HPF]

## 2020-11-18 LAB
AMPICILLIN SUSC ISLT: >=32
AMPICILLIN+SULBAC SUSC ISLT: >=32
BACTERIA UR CULT: ABNORMAL
CEFAZOLIN SUSC ISLT: 16
CEFEPIME SUSC ISLT: <=0.12
CEFTAZIDIME SUSC ISLT: <=1
CEFTRIAXONE SUSC ISLT: <=0.25
CIPROFLOXACIN SUSC ISLT: >=4
ERTAPENEM SUSC ISLT: <=0.12
GENTAMICIN SUSC ISLT: <=1
LEVOFLOXACIN SUSC ISLT: >=8
NITROFURANTOIN SUSC ISLT: <=16
PIP+TAZO SUSC ISLT: <=4
TMP SMX SUSC ISLT: >=320
TOBRAMYCIN SUSC ISLT: <=1

## 2020-12-14 ENCOUNTER — HOSPITAL ENCOUNTER (OUTPATIENT)
Dept: MAMMOGRAPHY | Facility: HOSPITAL | Age: 75
Discharge: HOME | End: 2020-12-14
Attending: INTERNAL MEDICINE

## 2021-01-05 ENCOUNTER — HOSPITAL ENCOUNTER (OUTPATIENT)
Dept: LAB | Facility: HOSPITAL | Age: 76
Discharge: HOME | End: 2021-01-05
Attending: INTERNAL MEDICINE

## 2021-01-05 LAB
APPEARANCE UR: CLEAR
BASOPHILS # BLD AUTO: 0.07 10*3/UL (ref 0–0.2)
BASOPHILS NFR BLD AUTO: 1 % (ref 0–3)
BILIRUB UR QL: NEGATIVE
COLOR UR: YELLOW
CONV ABS IMM GRAN: 0.03 10*3/UL (ref 0–0.2)
CONV BACTERIA: ABNORMAL
CONV COLLECTION SOURCE (UA): ABNORMAL
CONV IMMATURE GRAN: 0.4 % (ref 0–1.8)
CONV UROBILINOGEN IN URINE BY AUTOMATED TEST STRIP: 0.2 {EHRLICHU}/DL (ref 0.1–1)
DEPRECATED RDW RBC AUTO: 43.4 FL (ref 36.4–46.3)
EOSINOPHIL # BLD AUTO: 0.38 10*3/UL (ref 0–0.7)
EOSINOPHIL # BLD AUTO: 5.5 % (ref 0–7)
ERYTHROCYTE [DISTWIDTH] IN BLOOD BY AUTOMATED COUNT: 13 % (ref 11.7–14.4)
GLUCOSE UR QL: NEGATIVE MG/DL
HCT VFR BLD AUTO: 40.1 % (ref 37–47)
HGB BLD-MCNC: 12.5 G/DL (ref 12–16)
HGB UR QL STRIP: NEGATIVE
KETONES UR QL STRIP: NEGATIVE MG/DL
LEUKOCYTE ESTERASE UR QL STRIP: ABNORMAL
LYMPHOCYTES # BLD AUTO: 2.18 10*3/UL (ref 1–5)
LYMPHOCYTES NFR BLD AUTO: 31.5 % (ref 20–45)
MCH RBC QN AUTO: 28.5 PG (ref 27–31)
MCHC RBC AUTO-ENTMCNC: 31.2 G/DL (ref 33–37)
MCV RBC AUTO: 91.3 FL (ref 81–99)
MONOCYTES # BLD AUTO: 0.67 10*3/UL (ref 0.2–1.2)
MONOCYTES NFR BLD AUTO: 9.7 % (ref 3–10)
NEUTROPHILS # BLD AUTO: 3.59 10*3/UL (ref 2–8)
NEUTROPHILS NFR BLD AUTO: 51.9 % (ref 30–85)
NITRITE UR QL STRIP: NEGATIVE
NRBC CBCN: 0 % (ref 0–0.7)
PH UR STRIP.AUTO: 6 [PH] (ref 5–8)
PLATELET # BLD AUTO: 291 10*3/UL (ref 130–400)
PMV BLD AUTO: 10.4 FL (ref 9.4–12.3)
PROT UR QL: NEGATIVE MG/DL
RBC # BLD AUTO: 4.39 10*6/UL (ref 4.2–5.4)
RBC #/AREA URNS HPF: ABNORMAL /[HPF]
SP GR UR: 1.01 (ref 1–1.03)
SQUAMOUS SPT QL MICRO: ABNORMAL /[HPF]
WBC # BLD AUTO: 6.92 10*3/UL (ref 4.8–10.8)
WBC #/AREA URNS HPF: ABNORMAL /[HPF]

## 2021-01-06 LAB
25(OH)D3 SERPL-MCNC: 28.6 NG/ML (ref 30–100)
ALBUMIN SERPL-MCNC: 4.5 G/DL (ref 3.5–5)
ALBUMIN/GLOB SERPL: 1.4 {RATIO} (ref 1.4–2.6)
ALP SERPL-CCNC: 83 U/L (ref 43–160)
ALT SERPL-CCNC: 21 U/L (ref 10–40)
ANION GAP SERPL CALC-SCNC: 15 MMOL/L (ref 8–19)
AST SERPL-CCNC: 29 U/L (ref 15–50)
BILIRUB SERPL-MCNC: 0.32 MG/DL (ref 0.2–1.3)
BUN SERPL-MCNC: 19 MG/DL (ref 5–25)
BUN/CREAT SERPL: 25 {RATIO} (ref 6–20)
CALCIUM SERPL-MCNC: 9.9 MG/DL (ref 8.7–10.4)
CHLORIDE SERPL-SCNC: 99 MMOL/L (ref 99–111)
CHOLEST SERPL-MCNC: 216 MG/DL (ref 107–200)
CHOLEST/HDLC SERPL: 3 {RATIO} (ref 3–6)
CONV CO2: 26 MMOL/L (ref 22–32)
CONV TOTAL PROTEIN: 7.7 G/DL (ref 6.3–8.2)
CREAT UR-MCNC: 0.77 MG/DL (ref 0.5–0.9)
FOLATE SERPL-MCNC: 12.8 NG/ML (ref 4.8–20)
GFR SERPLBLD BASED ON 1.73 SQ M-ARVRAT: >60 ML/MIN/{1.73_M2}
GLOBULIN UR ELPH-MCNC: 3.2 G/DL (ref 2–3.5)
GLUCOSE SERPL-MCNC: 81 MG/DL (ref 65–99)
HDLC SERPL-MCNC: 71 MG/DL (ref 40–60)
LDLC SERPL CALC-MCNC: 125 MG/DL (ref 70–100)
OSMOLALITY SERPL CALC.SUM OF ELEC: 283 MOSM/KG (ref 273–304)
POTASSIUM SERPL-SCNC: 4.1 MMOL/L (ref 3.5–5.3)
SODIUM SERPL-SCNC: 136 MMOL/L (ref 135–147)
TRIGL SERPL-MCNC: 100 MG/DL (ref 40–150)
VIT B12 SERPL-MCNC: 886 PG/ML (ref 211–911)
VLDLC SERPL-MCNC: 20 MG/DL (ref 5–37)

## 2021-02-03 ENCOUNTER — PROCEDURE VISIT CONVERTED (OUTPATIENT)
Dept: PODIATRY | Facility: CLINIC | Age: 76
End: 2021-02-03
Attending: PODIATRIST

## 2021-02-24 ENCOUNTER — HOSPITAL ENCOUNTER (OUTPATIENT)
Dept: VACCINE CLINIC | Facility: HOSPITAL | Age: 76
Discharge: HOME | End: 2021-02-24
Attending: INTERNAL MEDICINE

## 2021-03-25 ENCOUNTER — HOSPITAL ENCOUNTER (OUTPATIENT)
Dept: VACCINE CLINIC | Facility: HOSPITAL | Age: 76
Discharge: HOME | End: 2021-03-25
Attending: INTERNAL MEDICINE

## 2021-04-12 ENCOUNTER — HOSPITAL ENCOUNTER (OUTPATIENT)
Dept: LAB | Facility: HOSPITAL | Age: 76
Discharge: HOME | End: 2021-04-12
Attending: INTERNAL MEDICINE

## 2021-04-12 LAB
ALBUMIN SERPL-MCNC: 4.1 G/DL (ref 3.5–5)
ALBUMIN/GLOB SERPL: 1.5 {RATIO} (ref 1.4–2.6)
ALP SERPL-CCNC: 75 U/L (ref 43–160)
ALT SERPL-CCNC: 17 U/L (ref 10–40)
ANION GAP SERPL CALC-SCNC: 14 MMOL/L (ref 8–19)
APPEARANCE UR: CLEAR
AST SERPL-CCNC: 22 U/L (ref 15–50)
BASOPHILS # BLD AUTO: 0.06 10*3/UL (ref 0–0.2)
BASOPHILS NFR BLD AUTO: 1 % (ref 0–3)
BILIRUB SERPL-MCNC: 0.37 MG/DL (ref 0.2–1.3)
BILIRUB UR QL: NEGATIVE
BUN SERPL-MCNC: 23 MG/DL (ref 5–25)
BUN/CREAT SERPL: 29 {RATIO} (ref 6–20)
CALCIUM SERPL-MCNC: 9.4 MG/DL (ref 8.7–10.4)
CHLORIDE SERPL-SCNC: 103 MMOL/L (ref 99–111)
CHOLEST SERPL-MCNC: 192 MG/DL (ref 107–200)
CHOLEST/HDLC SERPL: 3 {RATIO} (ref 3–6)
COLOR UR: YELLOW
CONV ABS IMM GRAN: 0.01 10*3/UL (ref 0–0.2)
CONV BACTERIA: ABNORMAL
CONV CO2: 25 MMOL/L (ref 22–32)
CONV COLLECTION SOURCE (UA): ABNORMAL
CONV IMMATURE GRAN: 0.2 % (ref 0–1.8)
CONV TOTAL PROTEIN: 6.9 G/DL (ref 6.3–8.2)
CONV UROBILINOGEN IN URINE BY AUTOMATED TEST STRIP: 0.2 {EHRLICHU}/DL (ref 0.1–1)
CREAT UR-MCNC: 0.78 MG/DL (ref 0.5–0.9)
DEPRECATED RDW RBC AUTO: 43.6 FL (ref 36.4–46.3)
EOSINOPHIL # BLD AUTO: 0.25 10*3/UL (ref 0–0.7)
EOSINOPHIL # BLD AUTO: 4.3 % (ref 0–7)
ERYTHROCYTE [DISTWIDTH] IN BLOOD BY AUTOMATED COUNT: 13.3 % (ref 11.7–14.4)
GFR SERPLBLD BASED ON 1.73 SQ M-ARVRAT: >60 ML/MIN/{1.73_M2}
GLOBULIN UR ELPH-MCNC: 2.8 G/DL (ref 2–3.5)
GLUCOSE SERPL-MCNC: 92 MG/DL (ref 65–99)
GLUCOSE UR QL: NEGATIVE MG/DL
HCT VFR BLD AUTO: 38.1 % (ref 37–47)
HDLC SERPL-MCNC: 64 MG/DL (ref 40–60)
HGB BLD-MCNC: 11.9 G/DL (ref 12–16)
HGB UR QL STRIP: NEGATIVE
KETONES UR QL STRIP: NEGATIVE MG/DL
LDLC SERPL CALC-MCNC: 112 MG/DL (ref 70–100)
LEUKOCYTE ESTERASE UR QL STRIP: ABNORMAL
LYMPHOCYTES # BLD AUTO: 1.57 10*3/UL (ref 1–5)
LYMPHOCYTES NFR BLD AUTO: 27.1 % (ref 20–45)
MAGNESIUM SERPL-MCNC: 2.02 MG/DL (ref 1.6–2.3)
MCH RBC QN AUTO: 27.5 PG (ref 27–31)
MCHC RBC AUTO-ENTMCNC: 31.2 G/DL (ref 33–37)
MCV RBC AUTO: 88.2 FL (ref 81–99)
MONOCYTES # BLD AUTO: 0.58 10*3/UL (ref 0.2–1.2)
MONOCYTES NFR BLD AUTO: 10 % (ref 3–10)
NEUTROPHILS # BLD AUTO: 3.32 10*3/UL (ref 2–8)
NEUTROPHILS NFR BLD AUTO: 57.4 % (ref 30–85)
NITRITE UR QL STRIP: POSITIVE
NRBC CBCN: 0 % (ref 0–0.7)
OSMOLALITY SERPL CALC.SUM OF ELEC: 289 MOSM/KG (ref 273–304)
PH UR STRIP.AUTO: 6 [PH] (ref 5–8)
PLATELET # BLD AUTO: 271 10*3/UL (ref 130–400)
PMV BLD AUTO: 10.3 FL (ref 9.4–12.3)
POTASSIUM SERPL-SCNC: 4.4 MMOL/L (ref 3.5–5.3)
PROT UR QL: NEGATIVE MG/DL
RBC # BLD AUTO: 4.32 10*6/UL (ref 4.2–5.4)
RBC #/AREA URNS HPF: ABNORMAL /[HPF]
SODIUM SERPL-SCNC: 138 MMOL/L (ref 135–147)
SP GR UR: 1.02 (ref 1–1.03)
SQUAMOUS SPT QL MICRO: ABNORMAL /[HPF]
TRIGL SERPL-MCNC: 81 MG/DL (ref 40–150)
VLDLC SERPL-MCNC: 16 MG/DL (ref 5–37)
WBC # BLD AUTO: 5.79 10*3/UL (ref 4.8–10.8)
WBC #/AREA URNS HPF: ABNORMAL /[HPF]

## 2021-04-13 LAB
25(OH)D3 SERPL-MCNC: 24.6 NG/ML (ref 30–100)
FERRITIN SERPL-MCNC: 46 NG/ML (ref 10–200)
FOLATE SERPL-MCNC: 6.7 NG/ML (ref 4.8–20)
IRON SATN MFR SERPL: 23 % (ref 20–55)
IRON SERPL-MCNC: 91 UG/DL (ref 60–170)
TIBC SERPL-MCNC: 396 UG/DL (ref 245–450)
TRANSFERRIN SERPL-MCNC: 277 MG/DL (ref 250–380)
VIT B12 SERPL-MCNC: 775 PG/ML (ref 211–911)

## 2021-04-14 LAB
AMPICILLIN SUSC ISLT: >=32
AMPICILLIN+SULBAC SUSC ISLT: >=32
BACTERIA UR CULT: ABNORMAL
CEFAZOLIN SUSC ISLT: 8
CEFEPIME SUSC ISLT: <=0.12
CEFTAZIDIME SUSC ISLT: <=1
CEFTRIAXONE SUSC ISLT: <=0.25
CIPROFLOXACIN SUSC ISLT: >=4
ERTAPENEM SUSC ISLT: <=0.12
GENTAMICIN SUSC ISLT: <=1
LEVOFLOXACIN SUSC ISLT: >=8
NITROFURANTOIN SUSC ISLT: <=16
PIP+TAZO SUSC ISLT: <=4
TMP SMX SUSC ISLT: <=20
TOBRAMYCIN SUSC ISLT: <=1

## 2021-04-21 ENCOUNTER — HOSPITAL ENCOUNTER (OUTPATIENT)
Dept: LAB | Facility: HOSPITAL | Age: 76
Discharge: HOME | End: 2021-04-21
Attending: INTERNAL MEDICINE

## 2021-04-21 LAB
APPEARANCE UR: CLEAR
BILIRUB UR QL: NEGATIVE
COLOR UR: YELLOW
CONV BACTERIA: ABNORMAL
CONV COLLECTION SOURCE (UA): ABNORMAL
CONV UROBILINOGEN IN URINE BY AUTOMATED TEST STRIP: 0.2 {EHRLICHU}/DL (ref 0.1–1)
GLUCOSE UR QL: NEGATIVE MG/DL
HGB UR QL STRIP: NEGATIVE
KETONES UR QL STRIP: NEGATIVE MG/DL
LEUKOCYTE ESTERASE UR QL STRIP: ABNORMAL
NITRITE UR QL STRIP: POSITIVE
PH UR STRIP.AUTO: 6.5 [PH] (ref 5–8)
PROT UR QL: NEGATIVE MG/DL
RBC #/AREA URNS HPF: ABNORMAL /[HPF]
SP GR UR: 1.01 (ref 1–1.03)
SQUAMOUS SPT QL MICRO: ABNORMAL /[HPF]
WBC #/AREA URNS HPF: ABNORMAL /[HPF]

## 2021-05-13 ENCOUNTER — PROCEDURE VISIT CONVERTED (OUTPATIENT)
Dept: PODIATRY | Facility: CLINIC | Age: 76
End: 2021-05-13
Attending: PODIATRIST

## 2021-05-13 NOTE — PROGRESS NOTES
Progress Note      Patient Name: Erik Bhatia   Patient ID: [id number]   Sex: Female   YOB: 1945    Primary Care Provider: Alda Borges MD   Referring Provider: Rian Jo DPM    Visit Date: August 20, 2020    Provider: Rian Jo DPM   Location: [facility]   Location Address: [address] [place] [address]  [id number]   Location Phone: [phone]          Chief Complaint  · Right Foot Pain      History Of Present Illness  Erik Bhatia presents to the office today for evaluation and treatment of      New, Established, New Problem:  est  Location:  hyperkeratotic lesion(s) on dorsal right second PIPJ  Duration: 2015  Onset:  gradual  Nature:  sore  Stable, worsening, improving: stable  Aggravating factors:  Patient reports lesion(s) is painful with shoegear and ambulation.    Previous Treatment: Change in shoes  Patient denies any fevers, chills, nausea, vomiting, shortness of breathe, nor any other constitutional signs nor symptoms.      Patient reports that no changes in their medications with their recent appointment with their primary care provider.       Past Medical History  Allergic rhinitis, chronic; Arthritis; Asthma; Bladder Disorder; Bunion; Corns and callus; GERD; Hammertoe; Ingrown toenail; Limb Pain; Limb Swelling; Numbness in feet; Shortness Of Air         Past Surgical History  Hiatal hernia repair         Medication List  Astepro 0.15 % (205.5 mcg) nasal spray,non-aerosol; B12 5,000-100 mcg sublingual lozenge; Claritin 10 mg oral tablet; Estrace 0.01 % (0.1 mg/gram) vaginal cream; Feosol Bifera 28 mg oral tablet; Flonase Allergy Relief 50 mcg/actuation nasal spray,suspension; Gaviscon  mg/15 mL oral suspension; magnesium 250 mg oral tablet; montelukast 10 mg oral tablet; omeprazole 20 mg oral capsule,delayed release(DR/EC); potassium chloride 10 mEq oral tablet extended release; Silvadene 1 % topical cream; Symbicort  "160-4.5 mcg/actuation inhalation HFA aerosol inhaler; Vitamin D3 5,000 unit oral tablet; Vyvanse 50 mg oral capsule         Allergy List  * Other; nitrofurantoin       Allergies Reconciled  Family Medical History  Stomach Neoplasm, Malignant; Diabetes, unspecified type; Diabetes         Social History  Alcohol (Never); Tobacco (Never)         Review of Systems  · Constitutional  o Admits  o : good general health lately  o Denies  o : fever, chills, additional constitutional symptoms except as noted in the HPI  · Eyes  o Denies  o : double vision  · HENT  o Denies  o : vertigo, recent head injury  · Cardiovascular  o Denies  o : chest pain, irregular heart beats  · Respiratory  o Denies  o : shortness of breath  · Gastrointestinal  o Denies  o : nausea, vomiting  · Integument  o * See HPI  · Neurologic  o Denies  o : altered mental status, tingling or numbness  · Musculoskeletal  o Denies  o : joint pain, joint swelling, limitation of motion      Vitals  Date Time BP Position Site L\R Cuff Size HR RR TEMP (F) WT  HT  BMI kg/m2 BSA m2 O2 Sat        08/20/2020 11:01 /56 Sitting    95 - R  97.6 163lbs 0oz 4'  11\" 32.92 1.75 98 %          Physical Examination  · Constitutional  o Appearance  o : The patient appears in no acute distress, generally in good health, is awake, alert and responding to questions appropriately.  · Cardiovascular  o Peripheral Vascular System  o :   § Pedal Pulses  § : 2+ and symmetrical  · Musculoskeletal  o Extremeties/Joint  o : Lower extremity muscle strength and range of motion is equal and symmetrical bilaterally. The knees are noted to be in normal alignment. Medial deviation of the first metatarsal with associated lateral deviation of the hallux at the metatarsal phalangeal joint bilaterally. Nonreducible contracted lesser toes 2 through 5 bilaterally.  · Skin and Subcutaneous Tissue  o Extremities  o :   § Right Lower Extremity  § : Grade 1 ulceration on the dorsal right second " toe area of the foot. Hyperkeratotic tissue with subepidermal hemosiderin deposition is present. No surrounding, edema, erythema, lymphangitis, fluctuance, nor signs of infection. No drainage present. 13 mm x 11 mm x 2 mm.   · Neurologic  o Muskuloskeletal  o :   § RLE  § : Sharp/dull sensation is within normal limits. [place]-Ayush 5.07 monofilament intact to all assessed areas.   § LLE  § : Sharp/dull sensation is within normal limits. [place]-Ayush 5.07 monofilament intact to all assessed areas.   · Procedures  o Debride Ulcer  o : This area was debrided via excisional partial thickness debridement with a 15 scalpel blade. Post debridement measurements were 10 mm x 8 mm x 1 mm in depth.           Assessment  · Foot pain, right     729.5/M79.671  · Decubitus ulcer of foot, stage 1       Pressure ulcer of other site, stage 1     707.09/L89.891      Plan  · Orders  o Active debridement of wound 20 square centimeters or less (12126) - - 08/20/2020  · Medications  o Medications have been Reconciled  o Transition of Care or Provider Policy  · Instructions  o Follow-up in 5 weeks  o Pt to monitor for problems and to contact Dr. Jo for follow-up should such signs occur. Patient states understanding and agreement with this plan.   o Electronically Identified Patient Education Materials Provided Electronically  · Disposition  o Call or Return if symptoms worsen or persist.            Electronically Signed by: Rian Jo DPM -Author on August 20, 2020 11:13:56 AM

## 2021-05-13 NOTE — PROGRESS NOTES
Progress Note      Patient Name: Erik Bhatia   Patient ID: [id number]   Sex: Female   YOB: 1945    Primary Care Provider: Alda Borges MD   Referring Provider: Rian Jo DPM    Visit Date: November 11, 2020    Provider: Rian Jo DPM   Location: [facility]   Location Address: [address] [place] [address]  [id number]   Location Phone: [phone]          Chief Complaint  · Right Foot Pain      History Of Present Illness  Erik Bhatia presents to the office today for evaluation and treatment of      New, Established, New Problem:  est  Location:  hyperkeratotic lesion(s) on dorsal right second PIPJ  Duration: 2015  Onset:  gradual  Nature:  sore  Stable, worsening, improving: stable  Aggravating factors:  Patient reports lesion(s) is painful with shoegear and ambulation.    Previous Treatment: Change in shoes  Patient denies any fevers, chills, nausea, vomiting, shortness of breathe, nor any other constitutional signs nor symptoms.      Patient relates no medical changes since their last visit.       Past Medical History  Allergic rhinitis, chronic; Arthritis; Asthma; Bladder Disorder; Bunion; Corns and callus; GERD; Hammertoe; Ingrown toenail; Limb Pain; Limb Swelling; Numbness in feet; Shortness Of Air         Past Surgical History  Hiatal hernia repair         Medication List  Astepro 0.15 % (205.5 mcg) nasal spray,non-aerosol; B12 5,000-100 mcg sublingual lozenge; Claritin 10 mg oral tablet; Estrace 0.01 % (0.1 mg/gram) vaginal cream; Feosol Bifera 28 mg oral tablet; Flonase Allergy Relief 50 mcg/actuation nasal spray,suspension; Gaviscon  mg/15 mL oral suspension; magnesium 250 mg oral tablet; montelukast 10 mg oral tablet; omeprazole 20 mg oral capsule,delayed release(DR/EC); potassium chloride 10 mEq oral tablet extended release; Silvadene 1 % topical cream; Symbicort 160-4.5 mcg/actuation inhalation HFA aerosol inhaler; Vitamin D3 5,000 unit  "oral tablet; Vyvanse 50 mg oral capsule         Allergy List  * Other; nitrofurantoin       Allergies Reconciled  Family Medical History  Stomach Neoplasm, Malignant; Diabetes, unspecified type; Diabetes         Social History  Alcohol (Never); Tobacco (Never)         Review of Systems  · Constitutional  o Admits  o : good general health lately  o Denies  o : fever, chills, additional constitutional symptoms except as noted in the HPI  · Eyes  o Denies  o : double vision  · HENT  o Denies  o : vertigo, recent head injury  · Cardiovascular  o Denies  o : chest pain, irregular heart beats  · Respiratory  o Denies  o : shortness of breath  · Gastrointestinal  o Denies  o : nausea, vomiting  · Integument  o * See HPI  · Neurologic  o Denies  o : altered mental status, tingling or numbness  · Musculoskeletal  o Denies  o : joint pain, joint swelling, limitation of motion      Vitals  Date Time BP Position Site L\R Cuff Size HR RR TEMP (F) WT  HT  BMI kg/m2 BSA m2 O2 Sat FR L/min FiO2        11/11/2020 10:53 /62 Sitting    103 - R  97.6 161lbs 0oz 4'  11\" 32.52 1.74 99 %            Physical Examination  · Constitutional  o Appearance  o : The patient appears in no acute distress, generally in good health, is awake, alert and responding to questions appropriately.  · Cardiovascular  o Peripheral Vascular System  o :   § Pedal Pulses  § : 2+ and symmetrical  · Musculoskeletal  o Extremeties/Joint  o : Lower extremity muscle strength and range of motion is equal and symmetrical bilaterally. The knees are noted to be in normal alignment. Medial deviation of the first metatarsal with associated lateral deviation of the hallux at the metatarsal phalangeal joint bilaterally. Nonreducible contracted lesser toes 2 through 5 bilaterally.  · Skin and Subcutaneous Tissue  o Extremities  o :   § Right Lower Extremity  § : Grade 1 ulceration on the dorsal right second toe area of the foot. Hyperkeratotic tissue with " subepidermal hemosiderin deposition is present. No surrounding, edema, erythema, lymphangitis, fluctuance, nor signs of infection. No drainage present. 12 mm x 11 mm x 2 mm.   · Neurologic  o Muskuloskeletal  o :   § RLE  § : Sharp/dull sensation is within normal limits. [place]-Ayush 5.07 monofilament intact to all assessed areas.   § LLE  § : Sharp/dull sensation is within normal limits. [place]-Ayush 5.07 monofilament intact to all assessed areas.   · Procedures  o Debride Ulcer  o : This area was debrided via excisional partial thickness debridement with a 15 scalpel blade. Post debridement measurements were 10 mm x 8 mm x 1 mm in depth.           Assessment  · Foot pain, right     729.5/M79.671  · Decubitus ulcer of foot, stage 1       Pressure ulcer of other site, stage 1     707.09/L89.891      Plan  · Orders  o Active debridement of wound 20 square centimeters or less (53495) - - 11/11/2020  · Medications  o Medications have been Reconciled  o Transition of Care or Provider Policy  · Instructions  o Follow-up in 5 weeks  o Pt to monitor for problems and to contact Dr. Jo for follow-up should such signs occur. Patient states understanding and agreement with this plan.   o Electronically Identified Patient Education Materials Provided Electronically  · Disposition  o Call or Return if symptoms worsen or persist.            Electronically Signed by: Rian Jo DPM -Author on November 11, 2020 11:10:30 AM

## 2021-05-13 NOTE — PROGRESS NOTES
Progress Note      Patient Name: Erik Bhatia   Patient ID: [id number]   Sex: Female   YOB: 1945    Primary Care Provider: Alda Borges MD   Referring Provider: Rian Jo DPM    Visit Date: June 19, 2020    Provider: Rian Jo DPM   Location: [facility]   Location Address: [address] [place] [address]  [id number]   Location Phone: [phone]          Chief Complaint  · Right Foot Pain      History Of Present Illness  Erik Bhatia presents to the office today for evaluation and treatment of      New, Established, New Problem:  est  Location:  hyperkeratotic lesion(s) on dorsal right second PIPJ  Duration: 2015  Onset:  gradual  Nature:  sore  Stable, worsening, improving: stable  Aggravating factors:  Patient reports lesion(s) is painful with shoegear and ambulation.    Previous Treatment: Change in shoes  Patient denies any fevers, chills, nausea, vomiting, shortness of breathe, nor any other constitutional signs nor symptoms.      Patient reports the following medical changes since their last visit:    - outpatient tx for UTI         Past Medical History  Allergic rhinitis, chronic; Arthritis; Asthma; Bladder Disorder; Bunion; Corns and callus; GERD; Hammertoe; Ingrown toenail; Limb Pain; Limb Swelling; Numbness in feet; Shortness Of Air         Past Surgical History  Hiatal hernia repair         Medication List  Astepro 0.15 % (205.5 mcg) nasal spray,non-aerosol; B12 5,000-100 mcg sublingual lozenge; Claritin 10 mg oral tablet; Estrace 0.01 % (0.1 mg/gram) vaginal cream; Feosol Bifera 28 mg oral tablet; Flonase Allergy Relief 50 mcg/actuation nasal spray,suspension; Gaviscon  mg/15 mL oral suspension; magnesium 250 mg oral tablet; montelukast 10 mg oral tablet; omeprazole 20 mg oral capsule,delayed release(DR/EC); potassium chloride 10 mEq oral tablet extended release; Silvadene 1 % topical cream; Symbicort 160-4.5 mcg/actuation  "inhalation HFA aerosol inhaler; Vitamin D3 5,000 unit oral tablet; Vyvanse 50 mg oral capsule         Allergy List  * Other; nitrofurantoin       Allergies Reconciled  Family Medical History  Stomach Neoplasm, Malignant; Diabetes, unspecified type; Diabetes         Social History  Alcohol (Never); Tobacco (Never)         Review of Systems  · Constitutional  o Admits  o : good general health lately  o Denies  o : fever, chills, additional constitutional symptoms except as noted in the HPI  · Eyes  o Denies  o : double vision  · HENT  o Denies  o : vertigo, recent head injury  · Cardiovascular  o Denies  o : chest pain, irregular heart beats  · Respiratory  o Denies  o : shortness of breath  · Gastrointestinal  o Denies  o : nausea, vomiting  · Integument  o * See HPI  · Neurologic  o Denies  o : altered mental status, tingling or numbness  · Musculoskeletal  o Denies  o : joint pain, joint swelling, limitation of motion      Vitals  Date Time BP Position Site L\R Cuff Size HR RR TEMP (F) WT  HT  BMI kg/m2 BSA m2 O2 Sat        06/19/2020 10:55 /67 Sitting    85 - R  97.5 163lbs 0oz 4'  11\" 32.92 1.75 99 %          Physical Examination  · Constitutional  o Appearance  o : The patient appears in no acute distress, generally in good health, is awake, alert and responding to questions appropriately.  · Cardiovascular  o Peripheral Vascular System  o :   § Pedal Pulses  § : 2+ and symmetrical  · Musculoskeletal  o Extremeties/Joint  o : Lower extremity muscle strength and range of motion is equal and symmetrical bilaterally. The knees are noted to be in normal alignment. Medial deviation of the first metatarsal with associated lateral deviation of the hallux at the metatarsal phalangeal joint bilaterally. Nonreducible contracted lesser toes 2 through 5 bilaterally.  · Skin and Subcutaneous Tissue  o Extremities  o :   § Right Lower Extremity  § : Grade 1 ulceration on the dorsal right second toe area of the foot. " Hyperkeratotic tissue with subepidermal hemosiderin deposition is present. No surrounding, edema, erythema, lymphangitis, fluctuance, nor signs of infection. No drainage present. 14 mm x 12 mm x 2 mm.   · Neurologic  o Muskuloskeletal  o :   § RLE  § : Sharp/dull sensation is within normal limits. [place]-Ayush 5.07 monofilament intact to all assessed areas.   § LLE  § : Sharp/dull sensation is within normal limits. [place]-Ayush 5.07 monofilament intact to all assessed areas.   · Procedures  o Debride Ulcer  o : This area was debrided via excisional partial thickness debridement with a 15 scalpel blade. Post debridement measurements were 11 mm x 9 mm x 1 mm in depth.           Assessment  · Foot pain, right     729.5/M79.671  · Decubitus ulcer of foot, stage 1       Pressure ulcer of other site, stage 1     707.09/L89.891      Plan  · Orders  o Active debridement of wound 20 square centimeters or less (94510) - - 06/19/2020  · Medications  o Medications have been Reconciled  o Transition of Care or Provider Policy  · Instructions  o Follow-up in 5 weeks  o Pt to monitor for problems and to contact Dr. Jo for follow-up should such signs occur. Patient states understanding and agreement with this plan.   o Electronically Identified Patient Education Materials Provided Electronically  · Disposition  o Call or Return if symptoms worsen or persist.            Electronically Signed by: Rian Jo DPM -Author on June 19, 2020 10:58:02 AM

## 2021-05-14 VITALS
OXYGEN SATURATION: 99 % | WEIGHT: 161 LBS | HEIGHT: 59 IN | TEMPERATURE: 97.6 F | HEART RATE: 103 BPM | SYSTOLIC BLOOD PRESSURE: 140 MMHG | DIASTOLIC BLOOD PRESSURE: 62 MMHG | BODY MASS INDEX: 32.46 KG/M2

## 2021-05-14 VITALS
BODY MASS INDEX: 33.26 KG/M2 | SYSTOLIC BLOOD PRESSURE: 149 MMHG | WEIGHT: 165 LBS | HEART RATE: 91 BPM | HEIGHT: 59 IN | TEMPERATURE: 97.3 F | OXYGEN SATURATION: 95 % | DIASTOLIC BLOOD PRESSURE: 66 MMHG

## 2021-05-14 VITALS
OXYGEN SATURATION: 98 % | BODY MASS INDEX: 32.86 KG/M2 | WEIGHT: 163 LBS | HEART RATE: 95 BPM | DIASTOLIC BLOOD PRESSURE: 56 MMHG | SYSTOLIC BLOOD PRESSURE: 126 MMHG | HEIGHT: 59 IN | TEMPERATURE: 97.6 F

## 2021-05-14 NOTE — PROGRESS NOTES
Progress Note      Patient Name: Erik Bhatia   Patient ID: [id number]   Sex: Female   YOB: 1945    Primary Care Provider: Alda Borges MD   Referring Provider: Rian Jo DPM    Visit Date: February 3, 2021    Provider: Rian Jo DPM   Location: [facility]   Location Address: [address] [place] [address]  [id number]   Location Phone: [phone]          Chief Complaint  · Right Foot Pain      History Of Present Illness  Erik Bhatia presents to the office today for evaluation and treatment of      New, Established, New Problem:  est  Location:  hyperkeratotic lesion(s) on dorsal right second PIPJ  Duration: 2015  Onset:  gradual  Nature:  sore  Stable, worsening, improving: stable  Aggravating factors:  Patient reports lesion(s) is painful with shoegear and ambulation.    Previous Treatment: Change in shoes, debridement    Patient denies any fevers, chills, nausea, vomiting, shortness of breathe, nor any other constitutional signs nor symptoms.      Patient relates no medical changes since their last visit.       Past Medical History  Allergic rhinitis, chronic; Arthritis; Asthma; Bladder Disorder; Bunion; Corns and callus; GERD; Hammertoe; Ingrown toenail; Limb Pain; Limb Swelling; Numbness in feet; Shortness Of Air         Past Surgical History  Hiatal hernia repair         Medication List  Astepro 0.15 % (205.5 mcg) nasal spray,non-aerosol; B12 5,000-100 mcg sublingual lozenge; Claritin 10 mg oral tablet; Estrace 0.01 % (0.1 mg/gram) vaginal cream; Feosol Bifera 28 mg oral tablet; Flonase Allergy Relief 50 mcg/actuation nasal spray,suspension; Gaviscon  mg/15 mL oral suspension; magnesium 250 mg oral tablet; montelukast 10 mg oral tablet; omeprazole 20 mg oral capsule,delayed release(DR/EC); potassium chloride 10 mEq oral tablet extended release; Silvadene 1 % topical cream; Symbicort 160-4.5 mcg/actuation inhalation HFA aerosol inhaler; Vitamin  "D3 5,000 unit oral tablet; Vyvanse 50 mg oral capsule         Allergy List  * Other; nitrofurantoin       Allergies Reconciled  Family Medical History  Stomach Neoplasm, Malignant; Diabetes, unspecified type; Diabetes         Social History  Alcohol (Never); Tobacco (Never)         Review of Systems  · Constitutional  o Admits  o : good general health lately  o Denies  o : fever, chills, additional constitutional symptoms except as noted in the HPI  · Eyes  o Denies  o : double vision  · HENT  o Denies  o : vertigo, recent head injury  · Cardiovascular  o Denies  o : chest pain, irregular heart beats  · Respiratory  o Denies  o : shortness of breath  · Gastrointestinal  o Denies  o : nausea, vomiting  · Integument  o * See HPI  · Neurologic  o Denies  o : altered mental status, tingling or numbness  · Musculoskeletal  o Denies  o : joint pain, joint swelling, limitation of motion      Vitals  Date Time BP Position Site L\R Cuff Size HR RR TEMP (F) WT  HT  BMI kg/m2 BSA m2 O2 Sat FR L/min FiO2 HC       02/03/2021 10:23 /66 Sitting    91 - R  97.3 165lbs 0oz 4'  11\" 33.33 1.77 95 %      02/03/2021 10:23 /61 Sitting                       Physical Examination  · Constitutional  o Appearance  o : The patient appears in no acute distress, generally in good health, is awake, alert and responding to questions appropriately.  · Cardiovascular  o Peripheral Vascular System  o :   § Pedal Pulses  § : 2+ and symmetrical  · Musculoskeletal  o Extremeties/Joint  o : Lower extremity muscle strength and range of motion is equal and symmetrical bilaterally. The knees are noted to be in normal alignment. Medial deviation of the first metatarsal with associated lateral deviation of the hallux at the metatarsal phalangeal joint bilaterally. Nonreducible contracted lesser toes 2 through 5 bilaterally.  · Skin and Subcutaneous Tissue  o Extremities  o :   § Right Lower Extremity  § : Grade 1 ulceration on the dorsal right " second toe area of the foot. Hyperkeratotic tissue with subepidermal hemosiderin deposition is present. No surrounding, edema, erythema, lymphangitis, fluctuance, nor signs of infection. No drainage present. 13 mm x 12 mm x 2 mm.   · Neurologic  o Muskuloskeletal  o :   § RLE  § : Sharp/dull sensation is within normal limits. [place]-Ayush 5.07 monofilament intact to all assessed areas.   § LLE  § : Sharp/dull sensation is within normal limits. [place]-Ayush 5.07 monofilament intact to all assessed areas.   · Procedures  o Debride Ulcer  o : This area was debrided via excisional partial thickness debridement with a 15 scalpel blade. Post debridement measurements were 11 mm x 9 mm x 1 mm in depth.           Assessment  · Foot pain, right     729.5/M79.671  · Decubitus ulcer of foot, stage 1       Pressure ulcer of other site, stage 1     707.09/L89.891      Plan  · Orders  o Active debridement of wound 20 square centimeters or less (56618) - - 02/03/2021  · Medications  o Medications have been Reconciled  o Transition of Care or Provider Policy  · Instructions  o Follow-up in 5 weeks  o I have discussed the findings of this evaluation with the patient. The discussion included a complete verbal explanation of any changes in the examination results, diagnosis, and the current treatment plan. A schedule for future care needs was explained. If any questions should arise after returning home, I have encouraged the patient to feel free to contact Dr. Jo. The patient states understanding and agreement with this plan.   o Pt to monitor for problems and to contact Dr. Jo for follow-up should such signs occur. Patient states understanding and agreement with this plan.   o Electronically Identified Patient Education Materials Provided Electronically  · Disposition  o Call or Return if symptoms worsen or persist.            Electronically Signed by: Rian Jo DPM -Author on February 3, 2021 10:34:52  AM

## 2021-05-15 VITALS
HEART RATE: 102 BPM | BODY MASS INDEX: 32.86 KG/M2 | OXYGEN SATURATION: 99 % | DIASTOLIC BLOOD PRESSURE: 56 MMHG | SYSTOLIC BLOOD PRESSURE: 130 MMHG | HEIGHT: 59 IN | WEIGHT: 163 LBS

## 2021-05-15 VITALS
HEIGHT: 59 IN | BODY MASS INDEX: 33.06 KG/M2 | SYSTOLIC BLOOD PRESSURE: 131 MMHG | WEIGHT: 164 LBS | DIASTOLIC BLOOD PRESSURE: 61 MMHG | OXYGEN SATURATION: 97 % | HEART RATE: 95 BPM

## 2021-05-15 VITALS
WEIGHT: 163 LBS | BODY MASS INDEX: 32.86 KG/M2 | DIASTOLIC BLOOD PRESSURE: 67 MMHG | HEART RATE: 85 BPM | HEIGHT: 59 IN | SYSTOLIC BLOOD PRESSURE: 134 MMHG | OXYGEN SATURATION: 99 % | TEMPERATURE: 97.5 F

## 2021-05-15 VITALS
HEART RATE: 99 BPM | DIASTOLIC BLOOD PRESSURE: 59 MMHG | BODY MASS INDEX: 32.66 KG/M2 | WEIGHT: 162 LBS | OXYGEN SATURATION: 99 % | SYSTOLIC BLOOD PRESSURE: 143 MMHG | HEIGHT: 59 IN

## 2021-05-15 VITALS — RESPIRATION RATE: 12 BRPM | WEIGHT: 161.12 LBS | HEIGHT: 59 IN | BODY MASS INDEX: 32.48 KG/M2

## 2021-05-16 VITALS — HEART RATE: 94 BPM | HEIGHT: 60 IN | WEIGHT: 162 LBS | OXYGEN SATURATION: 97 % | BODY MASS INDEX: 31.8 KG/M2

## 2021-06-05 NOTE — PROGRESS NOTES
Progress Note      Patient Name: Erik Bhatia   Patient ID: [id number]   Sex: Female   YOB: 1945    Primary Care Provider: Alda Borges MD   Referring Provider: Rian Jo DPM    Visit Date: May 13, 2021    Provider: Rian Jo DPM   Location: [facility]   Location Address: [address] [place] [address]  [id number]   Location Phone: [phone]          Chief Complaint  · Right Foot Pain      History Of Present Illness  Erik Bhatia presents to the office today for evaluation and treatment of      New, Established, New Problem:  est  Location:  hyperkeratotic lesion(s) on dorsal right second PIPJ  Duration: 2015  Onset:  gradual  Nature:  sore  Stable, worsening, improving: stable/recurring  Aggravating factors:  Patient reports lesion(s) is painful with shoegear and ambulation.    Previous Treatment: Change in shoes, debridement    Patient denies any fevers, chills, nausea, vomiting, shortness of breathe, nor any other constitutional signs nor symptoms.      Patient reports the following medical changes since their last visit:    - took COVID-19 vaccination       Past Medical History  Allergic rhinitis, chronic; Arthritis; Asthma; Bladder disorder; Bunion; Corns and callus; GERD; Hammertoe; Ingrown toenail; Limb Pain; Limb Swelling; Numbness in feet; Shortness Of Air         Past Surgical History  Hiatal hernia repair         Medication List  Astepro 0.15 % (205.5 mcg) nasal spray,non-aerosol; B12 5,000-100 mcg sublingual lozenge; Estrace 0.01 % (0.1 mg/gram) vaginal cream; Feosol Bifera 28 mg oral tablet; Flonase Allergy Relief 50 mcg/actuation nasal spray,suspension; Gaviscon  mg/15 mL oral suspension; magnesium 250 mg oral tablet; montelukast 10 mg oral tablet; omeprazole 20 mg oral capsule,delayed release(DR/EC); potassium chloride 10 mEq oral tablet extended release; Silvadene 1 % topical cream; Symbicort 160-4.5 mcg/actuation inhalation HFA  "aerosol inhaler; Vitamin D3 5,000 unit oral tablet; Vyvanse 50 mg oral capsule; Zyrtec-D 5-120 mg oral tablet extended release 12 hr         Allergy List  * Other; nitrofurantoin       Allergies Reconciled  Family Medical History  Stomach Neoplasm, Malignant; Diabetes, unspecified type; Diabetes         Social History  Alcohol (Never); Tobacco (Never)         Review of Systems  · Constitutional  o Admits  o : good general health lately  o Denies  o : fever, chills, additional constitutional symptoms except as noted in the HPI  · Eyes  o Denies  o : double vision  · HENT  o Denies  o : vertigo, recent head injury  · Cardiovascular  o Denies  o : chest pain, irregular heart beats  · Respiratory  o Denies  o : shortness of breath  · Gastrointestinal  o Denies  o : nausea, vomiting  · Integument  o * See HPI  · Neurologic  o Denies  o : altered mental status, tingling or numbness  · Musculoskeletal  o Denies  o : joint pain, joint swelling, limitation of motion      Vitals  Date Time BP Position Site L\R Cuff Size HR RR TEMP (F) WT  HT  BMI kg/m2 BSA m2 O2 Sat FR L/min FiO2        05/13/2021 01:56 /55 Sitting    95 - R  97.6 170lbs 0oz 4'  11\" 34.34 1.79 99 %            Physical Examination  · Constitutional  o Appearance  o : The patient appears in no acute distress, generally in good health, is awake, alert and responding to questions appropriately.  · Cardiovascular  o Peripheral Vascular System  o :   § Pedal Pulses  § : 2+ and symmetrical  · Musculoskeletal  o Extremeties/Joint  o : Lower extremity muscle strength and range of motion is equal and symmetrical bilaterally. The knees are noted to be in normal alignment. Medial deviation of the first metatarsal with associated lateral deviation of the hallux at the metatarsal phalangeal joint bilaterally. Nonreducible contracted lesser toes 2 through 5 bilaterally.  · Skin and Subcutaneous Tissue  o Extremities  o :   § Right Lower Extremity  § : Grade 1 " ulceration on the dorsal right second toe area of the foot. Hyperkeratotic tissue with subepidermal hemosiderin deposition is present. No surrounding, edema, erythema, lymphangitis, fluctuance, nor signs of infection. No drainage present. 12 mm x 11 mm x 2 mm.   · Neurologic  o Muskuloskeletal  o :   § RLE  § : Sharp/dull sensation is within normal limits. [place]-Ayush 5.07 monofilament intact to all assessed areas.   § LLE  § : Sharp/dull sensation is within normal limits. [place]-Ayush 5.07 monofilament intact to all assessed areas.   · Procedures  o Debride Ulcer  o : This area was debrided via excisional partial thickness debridement with a 15 scalpel blade. Post debridement measurements were 10 mm x 9 mm x 1 mm in depth.           Assessment  · Foot pain, right     729.5/M79.671  · Decubitus ulcer of foot, stage 1       Pressure ulcer of other site, stage 1     707.09/L89.891      Plan  · Orders  o Active debridement of wound 20 square centimeters or less (81494) - - 05/13/2021  · Medications  o Medications have been Reconciled  o Transition of Care or Provider Policy  · Instructions  o Follow-up in 5 weeks  o I have discussed the findings of this evaluation with the patient. The discussion included a complete verbal explanation of any changes in the examination results, diagnosis, and the current treatment plan. A schedule for future care needs was explained. If any questions should arise after returning home, I have encouraged the patient to feel free to contact Dr. Jo. The patient states understanding and agreement with this plan.   o Pt to monitor for problems and to contact Dr. Jo for follow-up should such signs occur. Patient states understanding and agreement with this plan.   o Electronically Identified Patient Education Materials Provided Electronically  · Disposition  o Call or Return if symptoms worsen or persist.            Electronically Signed by: Rian Jo DPM -Author  on May 13, 2021 02:21:09 PM

## 2021-07-15 VITALS
OXYGEN SATURATION: 99 % | HEIGHT: 59 IN | BODY MASS INDEX: 34.27 KG/M2 | SYSTOLIC BLOOD PRESSURE: 120 MMHG | TEMPERATURE: 97.6 F | HEART RATE: 95 BPM | DIASTOLIC BLOOD PRESSURE: 55 MMHG | WEIGHT: 170 LBS

## 2021-07-27 DIAGNOSIS — F98.8 ATTENTION DEFICIT DISORDER (ADD) WITHOUT HYPERACTIVITY: Primary | ICD-10-CM

## 2021-07-27 RX ORDER — LISDEXAMFETAMINE DIMESYLATE 50 MG
CAPSULE ORAL
COMMUNITY
Start: 2021-06-24 | End: 2021-07-27 | Stop reason: SDUPTHER

## 2021-07-27 RX ORDER — LISDEXAMFETAMINE DIMESYLATE 50 MG
50 CAPSULE ORAL EVERY MORNING
Qty: 30 CAPSULE | Refills: 0 | Status: SHIPPED | OUTPATIENT
Start: 2021-07-27 | End: 2021-08-26 | Stop reason: SDUPTHER

## 2021-07-28 PROBLEM — M20.40 HAMMERTOE: Status: ACTIVE | Noted: 2021-07-28

## 2021-07-28 PROBLEM — M21.619 BUNION: Status: ACTIVE | Noted: 2021-07-28

## 2021-07-28 PROBLEM — N32.9 BLADDER DISORDER: Status: ACTIVE | Noted: 2021-07-28

## 2021-07-28 PROBLEM — L84 CORNS AND CALLOSITY: Status: ACTIVE | Noted: 2021-07-28

## 2021-07-28 PROBLEM — M79.609 LIMB PAIN: Status: ACTIVE | Noted: 2021-07-28

## 2021-07-28 PROBLEM — M79.89 LIMB SWELLING: Status: ACTIVE | Noted: 2021-07-28

## 2021-07-28 PROBLEM — R20.0 NUMBNESS IN FEET: Status: ACTIVE | Noted: 2021-07-28

## 2021-07-28 PROBLEM — L60.0 INGROWN TOENAIL: Status: ACTIVE | Noted: 2021-07-28

## 2021-07-28 PROBLEM — J45.909 ASTHMA: Status: ACTIVE | Noted: 2021-07-28

## 2021-07-28 PROBLEM — M19.90 ARTHRITIS: Status: ACTIVE | Noted: 2021-07-28

## 2021-08-04 ENCOUNTER — OFFICE VISIT (OUTPATIENT)
Dept: PODIATRY | Facility: CLINIC | Age: 76
End: 2021-08-04

## 2021-08-04 VITALS
HEART RATE: 89 BPM | WEIGHT: 159 LBS | TEMPERATURE: 97.8 F | DIASTOLIC BLOOD PRESSURE: 51 MMHG | SYSTOLIC BLOOD PRESSURE: 136 MMHG | OXYGEN SATURATION: 98 % | HEIGHT: 60 IN | BODY MASS INDEX: 31.22 KG/M2

## 2021-08-04 DIAGNOSIS — M20.41 HAMMER TOES OF BOTH FEET: ICD-10-CM

## 2021-08-04 DIAGNOSIS — M20.12 VALGUS DEFORMITY OF BOTH GREAT TOES: ICD-10-CM

## 2021-08-04 DIAGNOSIS — L89.891 PRESSURE INJURY OF TOE OF RIGHT FOOT, STAGE 1: ICD-10-CM

## 2021-08-04 DIAGNOSIS — M20.42 HAMMER TOES OF BOTH FEET: ICD-10-CM

## 2021-08-04 DIAGNOSIS — M20.11 VALGUS DEFORMITY OF BOTH GREAT TOES: ICD-10-CM

## 2021-08-04 DIAGNOSIS — M79.671 FOOT PAIN, RIGHT: Primary | ICD-10-CM

## 2021-08-04 PROCEDURE — 97597 DBRDMT OPN WND 1ST 20 CM/<: CPT | Performed by: PODIATRIST

## 2021-08-04 NOTE — PROGRESS NOTES
[facility]    Today's Date: 08/04/21    Patient Name: Erik Bhatia  MRN: [id number]  CSN: [id number]  PCP: Alda Borges MD  Referring Provider: No ref. provider found    SUBJECTIVE     Chief Complaint   Patient presents with   • Left Foot - Callouses   • Right Foot - Callouses     HPI: Erik Bhatia, a 76 y.o.female, comes presents to clinic with chief complaint of:    New, Established, New Problem: Established    Location:  hyperkeratotic lesion(s) on dorsal right second proximal interphalangeal joint    Duration: 2015    Onset:  gradual    Nature:  sore    Stable, worsening, improving: Stable, recurring    Aggravating factors:  Patient reports lesion(s) is painful with shoegear and ambulation.      Previous Treatment:   Debridement    Past Medical History:   Diagnosis Date   • Arthritis    • Asthma    • Bladder disorder    • Bunion    • Chronic allergic rhinitis    • Corns and callus    • GERD (gastroesophageal reflux disease)    • Hammertoe    • Ingrown toenail    • Limb pain    • Limb swelling    • Numbness in feet    • SOB (shortness of breath)      Past Surgical History:   Procedure Laterality Date   • BREAST BIOPSY     • CARPAL TUNNEL RELEASE     • TRIGGER FINGER RELEASE       Family History   Problem Relation Age of Onset   • Stomach cancer Father    • Diabetes Father    • Diabetes Maternal Grandfather      Social History     Socioeconomic History   • Marital status:      Spouse name: Not on file   • Number of children: Not on file   • Years of education: Not on file   • Highest education level: Not on file   Tobacco Use   • Smoking status: Never Smoker   Vaping Use   • Vaping Use: Never used   Substance and Sexual Activity   • Alcohol use: Never   • Drug use: Never   • Sexual activity: Defer     Allergies   Allergen Reactions   • Nitrofurantoin Provider Review Needed     Current Outpatient Medications   Medication Sig Dispense Refill   • aluminum  hydroxide-magnesium carbonate (Gaviscon)  MG/15ML suspension oral suspension 1 mL.     • azelastine (Astepro) 0.15 % solution nasal spray Astepro 0.15 % (205.5 mcg) nasal spray,non-aerosol spray 2 sprays (411 mcg) in each nostril by intranasal route once daily   Active     • budesonide-formoterol (Symbicort) 160-4.5 MCG/ACT inhaler Symbicort 160-4.5 mcg/actuation inhalation HFA aerosol inhaler inhale 2 puffs by inhalation route 2 times per day in the morning and evening   Active     • carbonyl iron (FEOSOL) 45 MG tablet tablet Take 1 tablet by mouth Daily. 1 tablet daily     • cetirizine-pseudoephedrine (ZyrTEC-D Allergy & Congestion) 5-120 MG per 12 hr tablet Zyrtec-D 5-120 mg oral tablet extended release 12 hr take 1 tablet by oral route 2 times per day   Active     • Cholecalciferol 125 MCG (5000 UT) tablet Vitamin D3 5,000 unit oral tablet take 1 tablet by oral route daily   Active     • estradiol (ESTRACE VAGINAL) 0.1 MG/GM vaginal cream 1 g.     • fluticasone (Flonase) 50 MCG/ACT nasal spray Flonase Allergy Relief 50 mcg/actuation nasal spray,suspension spray 1 - 2 sprays (50 - 100 mcg) in each nostril by intranasal route once daily as needed   Active     • montelukast (SINGULAIR) 10 MG tablet montelukast 10 mg oral tablet take 1 tablet (10 mg) by oral route once daily in the evening   Active     • olopatadine (Pataday) 0.2 % solution ophthalmic solution 1 drop.     • omeprazole (priLOSEC) 20 MG capsule omeprazole 20 mg oral capsule,delayed release(DR/EC) take 1 capsule (20 mg) by oral route once daily before a meal   Active     • potassium chloride 10 MEQ CR tablet potassium chloride 10 mEq oral tablet extended release take 2 tablets (20 meq) by oral route once daily with food   Active     • triamcinolone (KENALOG) 0.1 % ointment      • Vyvanse 50 MG capsule Take 1 capsule by mouth Every Morning 30 capsule 0   • ergocalciferol (ERGOCALCIFEROL) 1.25 MG (49924 UT) capsule Vitamin D2 50,000 unit oral  capsule take 1 capsule (50,000 unit) by oral route once weekly   Suspended     • Polysacch Fe Cmp-Fe Heme Poly (Bifera) 28 MG tablet Feosol Bifera 28 mg oral tablet take 1 tablet by oral route daily   Active     • sulfamethoxazole-trimethoprim (BACTRIM,SEPTRA) 400-80 MG tablet        No current facility-administered medications for this visit.     Review of Systems   Skin:        Painful hyperkeratotic lesion   All other systems reviewed and are negative.      OBJECTIVE     Vitals:    08/04/21 1347   BP: 136/51   Pulse: 89   Temp: 97.8 °F (36.6 °C)   SpO2: 98%       Body mass index is 31.05 kg/m².    Lab Results   Component Value Date    CALCIUM 9.4 04/12/2021     04/12/2021    K 4.4 04/12/2021    CO2 25 04/12/2021     04/12/2021    BUN 23 04/12/2021    CREATININE 0.78 04/12/2021    BCR 29 (H) 04/12/2021    ANIONGAP 14 04/12/2021       Patient seen in no apparent distress.      PHYSICAL EXAM:     Foot/Ankle Exam:       General:   Appearance: appears stated age and healthy    Orientation: AAOx3    Affect: appropriate    Gait: unimpaired    Shoe Gear:  Casual shoes    VASCULAR      Right Foot Vascularity   Normal vascular exam    Dorsalis pedis:  2+  Posterior tibial:  2+  Skin Temperature: warm    Edema Grading:  None  CFT:  < 3 seconds  Pedal Hair Growth:  Present  Varicosities: none       Left Foot Vascularity   Normal vascular exam    Dorsalis pedis:  2+  Posterior tibial:  2+  Skin Temperature: warm    Edema Grading:  None  CFT:  < 3 seconds  Pedal Hair Growth:  Present  Varicosities: none        NEUROLOGIC     Right Foot Neurologic   Normal sensation    Light touch sensation:  Normal  Vibratory sensation:  Normal  Hot/Cold sensation: normal    Protective Sensation using [place]-Ayush Monofilament:  10     Left Foot Neurologic   Normal sensation    Light touch sensation:  Normal  Vibratory sensation:  Normal  Hot/cold sensation: normal    Protective Sensation using [place]-Ayush Monofilament:   10     MUSCULOSKELETAL      Right Foot Musculoskeletal   Hammertoe:  Second toe, third toe, fourth toe and fifth toe  Hallux valgus: Yes       Left Foot Musculoskeletal   Hammertoe:  Second toe, third toe, fourth toe and fifth toe  Hallux valgus: Yes       MUSCLE STRENGTH     Right Foot Muscle Strength   Normal strength    Foot dorsiflexion:  5  Foot plantar flexion:  5  Foot inversion:  5  Foot eversion:  5     Left Foot Muscle Strength   Foot dorsiflexion:  5  Foot plantar flexion:  5  Foot inversion:  5  Foot eversion:  5     RANGE OF MOTION      Right Foot Range of Motion   Foot and ankle ROM within normal limits       Left Foot Range of Motion   Foot and ankle ROM within normal limits       DERMATOLOGIC     Right Foot Dermatologic   Skin: ulcer    Nails: normal       Left Foot Dermatologic   Skin: skin intact    Nails: normal        Right Foot Additional Comments Stage 1 ulceration on the dorsal right second PIP joint area of the foot.  Hyperkeratotic tissue with subepidermal hemosiderin deposition is present.  No surrounding, edema, erythema, lymphangitis, fluctuance, nor signs of infection.  No drainage present.  14 mm x 12 mm x 3 mm.  This area was debrided via excisional partial thickness debridement with a 10 scalpel blade.  Post debridement measurements were 12 mm x 9 mm x 1 mm in depth.        ASSESSMENT/PLAN     Diagnoses and all orders for this visit:    1. Foot pain, right (Primary)    2. Valgus deformity of both great toes    3. Hammer toes of both feet    4. Pressure injury of toe of right foot, stage 1        Comprehensive lower extremity examination and evaluation was performed.    Discussed findings and treatment plan including risks, benefits, and treatment options with patient in detail. Patient agreed with treatment plan.    An After Visit Summary was printed and given to the patient at discharge, including (if requested) any available informative/educational handouts regarding diagnosis,  treatment, or medications. All questions were answered to patient/family satisfaction. Should symptoms fail to improve or worsen they agree to call or return to clinic or to go to the Emergency Department. Discussed the importance of following up with any needed screening tests/labs/specialist appointments and any requested follow-up recommended by me today. Importance of maintaining follow-up discussed and patient accepts that missed appointments can delay diagnosis and potentially lead to worsening of conditions.    Return in about 9 weeks (around 10/6/2021) for Ulcer Follow-Up., or sooner if acute issues arise.    This document has been electronically signed by Rian Jo DPM on August 4, 2021 14:22 EDT

## 2021-08-26 DIAGNOSIS — F98.8 ATTENTION DEFICIT DISORDER (ADD) WITHOUT HYPERACTIVITY: ICD-10-CM

## 2021-08-26 RX ORDER — LISDEXAMFETAMINE DIMESYLATE 50 MG
50 CAPSULE ORAL EVERY MORNING
Qty: 30 CAPSULE | Refills: 0 | Status: SHIPPED | OUTPATIENT
Start: 2021-08-26 | End: 2021-09-29 | Stop reason: SDUPTHER

## 2021-09-10 ENCOUNTER — LAB (OUTPATIENT)
Dept: LAB | Facility: HOSPITAL | Age: 76
End: 2021-09-10

## 2021-09-10 ENCOUNTER — TRANSCRIBE ORDERS (OUTPATIENT)
Dept: INTERNAL MEDICINE | Facility: CLINIC | Age: 76
End: 2021-09-10

## 2021-09-10 DIAGNOSIS — Z79.899 ENCOUNTER FOR LONG-TERM (CURRENT) USE OF OTHER MEDICATIONS: ICD-10-CM

## 2021-09-10 DIAGNOSIS — R53.83 TIREDNESS: ICD-10-CM

## 2021-09-10 DIAGNOSIS — E78.5 HYPERLIPIDEMIA, UNSPECIFIED HYPERLIPIDEMIA TYPE: ICD-10-CM

## 2021-09-10 DIAGNOSIS — R53.83 TIREDNESS: Primary | ICD-10-CM

## 2021-09-10 DIAGNOSIS — E55.9 VITAMIN D DEFICIENCY: ICD-10-CM

## 2021-09-10 LAB
25(OH)D3 SERPL-MCNC: 24.7 NG/ML
ALBUMIN SERPL-MCNC: 4.1 G/DL (ref 3.5–5.2)
ALBUMIN/GLOB SERPL: 1.5 G/DL
ALP SERPL-CCNC: 83 U/L (ref 39–117)
ALT SERPL W P-5'-P-CCNC: 16 U/L (ref 1–33)
ANION GAP SERPL CALCULATED.3IONS-SCNC: 7.5 MMOL/L (ref 5–15)
AST SERPL-CCNC: 19 U/L (ref 1–32)
BASOPHILS # BLD AUTO: 0.07 10*3/MM3 (ref 0–0.2)
BASOPHILS NFR BLD AUTO: 1.1 % (ref 0–1.5)
BILIRUB SERPL-MCNC: 0.3 MG/DL (ref 0–1.2)
BUN SERPL-MCNC: 17 MG/DL (ref 8–23)
BUN/CREAT SERPL: 23.6 (ref 7–25)
CALCIUM SPEC-SCNC: 9.3 MG/DL (ref 8.6–10.5)
CHLORIDE SERPL-SCNC: 104 MMOL/L (ref 98–107)
CHOLEST SERPL-MCNC: 191 MG/DL (ref 0–200)
CO2 SERPL-SCNC: 25.5 MMOL/L (ref 22–29)
CREAT SERPL-MCNC: 0.72 MG/DL (ref 0.57–1)
DEPRECATED RDW RBC AUTO: 39.6 FL (ref 37–54)
EOSINOPHIL # BLD AUTO: 0.27 10*3/MM3 (ref 0–0.4)
EOSINOPHIL NFR BLD AUTO: 4.4 % (ref 0.3–6.2)
ERYTHROCYTE [DISTWIDTH] IN BLOOD BY AUTOMATED COUNT: 12.4 % (ref 12.3–15.4)
FOLATE SERPL-MCNC: 9.27 NG/ML (ref 4.78–24.2)
GFR SERPL CREATININE-BSD FRML MDRD: 79 ML/MIN/1.73
GLOBULIN UR ELPH-MCNC: 2.7 GM/DL
GLUCOSE SERPL-MCNC: 95 MG/DL (ref 65–99)
HCT VFR BLD AUTO: 34.5 % (ref 34–46.6)
HDLC SERPL-MCNC: 67 MG/DL (ref 40–60)
HGB BLD-MCNC: 11.3 G/DL (ref 12–15.9)
IMM GRANULOCYTES # BLD AUTO: 0.01 10*3/MM3 (ref 0–0.05)
IMM GRANULOCYTES NFR BLD AUTO: 0.2 % (ref 0–0.5)
LDLC SERPL CALC-MCNC: 109 MG/DL (ref 0–100)
LDLC/HDLC SERPL: 1.6 {RATIO}
LYMPHOCYTES # BLD AUTO: 1.91 10*3/MM3 (ref 0.7–3.1)
LYMPHOCYTES NFR BLD AUTO: 31.1 % (ref 19.6–45.3)
MCH RBC QN AUTO: 28.4 PG (ref 26.6–33)
MCHC RBC AUTO-ENTMCNC: 32.8 G/DL (ref 31.5–35.7)
MCV RBC AUTO: 86.7 FL (ref 79–97)
MONOCYTES # BLD AUTO: 0.72 10*3/MM3 (ref 0.1–0.9)
MONOCYTES NFR BLD AUTO: 11.7 % (ref 5–12)
NEUTROPHILS NFR BLD AUTO: 3.17 10*3/MM3 (ref 1.7–7)
NEUTROPHILS NFR BLD AUTO: 51.5 % (ref 42.7–76)
NRBC BLD AUTO-RTO: 0 /100 WBC (ref 0–0.2)
PLATELET # BLD AUTO: 265 10*3/MM3 (ref 140–450)
PMV BLD AUTO: 10.9 FL (ref 6–12)
POTASSIUM SERPL-SCNC: 3.8 MMOL/L (ref 3.5–5.2)
PROT SERPL-MCNC: 6.8 G/DL (ref 6–8.5)
RBC # BLD AUTO: 3.98 10*6/MM3 (ref 3.77–5.28)
SODIUM SERPL-SCNC: 137 MMOL/L (ref 136–145)
TRIGL SERPL-MCNC: 84 MG/DL (ref 0–150)
VIT B12 BLD-MCNC: >2000 PG/ML (ref 211–946)
VLDLC SERPL-MCNC: 15 MG/DL (ref 5–40)
WBC # BLD AUTO: 6.15 10*3/MM3 (ref 3.4–10.8)

## 2021-09-10 PROCEDURE — 36415 COLL VENOUS BLD VENIPUNCTURE: CPT

## 2021-09-10 PROCEDURE — 82607 VITAMIN B-12: CPT

## 2021-09-10 PROCEDURE — 82746 ASSAY OF FOLIC ACID SERUM: CPT

## 2021-09-10 PROCEDURE — 80061 LIPID PANEL: CPT

## 2021-09-10 PROCEDURE — 82306 VITAMIN D 25 HYDROXY: CPT

## 2021-09-10 PROCEDURE — 80053 COMPREHEN METABOLIC PANEL: CPT

## 2021-09-10 PROCEDURE — 85025 COMPLETE CBC W/AUTO DIFF WBC: CPT

## 2021-09-13 ENCOUNTER — OFFICE VISIT (OUTPATIENT)
Dept: INTERNAL MEDICINE | Facility: CLINIC | Age: 76
End: 2021-09-13

## 2021-09-13 VITALS
DIASTOLIC BLOOD PRESSURE: 70 MMHG | HEART RATE: 84 BPM | BODY MASS INDEX: 31.61 KG/M2 | SYSTOLIC BLOOD PRESSURE: 110 MMHG | TEMPERATURE: 96.6 F | RESPIRATION RATE: 20 BRPM | WEIGHT: 161 LBS | HEIGHT: 60 IN

## 2021-09-13 DIAGNOSIS — F98.8 ATTENTION DEFICIT DISORDER (ADD) WITHOUT HYPERACTIVITY: ICD-10-CM

## 2021-09-13 DIAGNOSIS — E78.5 HYPERLIPIDEMIA, UNSPECIFIED HYPERLIPIDEMIA TYPE: ICD-10-CM

## 2021-09-13 DIAGNOSIS — E55.9 VITAMIN D DEFICIENCY: ICD-10-CM

## 2021-09-13 DIAGNOSIS — Z12.31 ENCOUNTER FOR SCREENING MAMMOGRAM FOR BREAST CANCER: ICD-10-CM

## 2021-09-13 DIAGNOSIS — M85.851 OSTEOPENIA OF BOTH HIPS: ICD-10-CM

## 2021-09-13 DIAGNOSIS — Z79.899 ENCOUNTER FOR LONG-TERM (CURRENT) USE OF OTHER MEDICATIONS: ICD-10-CM

## 2021-09-13 DIAGNOSIS — E53.8 B12 DEFICIENCY: ICD-10-CM

## 2021-09-13 DIAGNOSIS — J30.1 NON-SEASONAL ALLERGIC RHINITIS DUE TO POLLEN: ICD-10-CM

## 2021-09-13 DIAGNOSIS — E61.1 IRON DEFICIENCY: ICD-10-CM

## 2021-09-13 DIAGNOSIS — G25.81 RLS (RESTLESS LEGS SYNDROME): ICD-10-CM

## 2021-09-13 DIAGNOSIS — J45.30 MILD PERSISTENT ASTHMA WITHOUT COMPLICATION: Primary | ICD-10-CM

## 2021-09-13 DIAGNOSIS — M85.852 OSTEOPENIA OF BOTH HIPS: ICD-10-CM

## 2021-09-13 DIAGNOSIS — D50.9 IRON DEFICIENCY ANEMIA, UNSPECIFIED IRON DEFICIENCY ANEMIA TYPE: Primary | ICD-10-CM

## 2021-09-13 PROCEDURE — 99214 OFFICE O/P EST MOD 30 MIN: CPT | Performed by: INTERNAL MEDICINE

## 2021-09-13 RX ORDER — CETIRIZINE HYDROCHLORIDE, PSEUDOEPHEDRINE HYDROCHLORIDE 5; 120 MG/1; MG/1
TABLET, FILM COATED, EXTENDED RELEASE ORAL
Qty: 180 TABLET | Refills: 3 | Status: CANCELLED | OUTPATIENT
Start: 2021-09-13

## 2021-09-13 NOTE — ASSESSMENT & PLAN NOTE
Patient's asthma has been doing fairly well this summer.  Is exacerbated during her ragweed season currently.  She feels the birds every day.  That gives her some exercise also.  She wears her mask if necessary.  She is on Symbicort 160/4.5 twice daily.  She is also on montelukast 10 mg daily.  She takes Zyrtec daily.  She has seen an allergist in the past.    Plan continue the Symbicort if her insurance will pay for.  Otherwise she can check with them and see if they will pay for Breo a little better.  Also continue the montelukast and Zyrtec.  She also on Flonase daily.  Also uses Pataday for allergic conjunctivitis.

## 2021-09-13 NOTE — ASSESSMENT & PLAN NOTE
Patient has long history of RLS.  She treats with magnesium usually.  She also uses pickle juice or apple cider vinegar which seems to help her.  She does not want any prescription medicine.

## 2021-09-13 NOTE — PROGRESS NOTES
"Chief Complaint  Labs Only    Subjective      Clancie SALVADOR Bhatia presents to [facility]  History of Present Illness  Patient has been renovating her kitchen.  She put down wood floors and that helped her allergies..  New fixtures also.  Has been going on for several months now.  No chest pain or shortness of breath.  Mood is good.  Has been eating with her nephew and niece.  Was unable to cook her own kitchen.  She feels a little fatigued and thinks that she might be iron deficient.  We will try to get a iron profile added to the blood in the lab  .Objective   Vital Signs:   /70 (BP Location: Left arm, Patient Position: Sitting, Cuff Size: Adult)   Pulse 84   Temp 96.6 °F (35.9 °C) (Temporal)   Resp 20   Ht 152.4 cm (60\")   Wt 73 kg (161 lb)   BMI 31.44 kg/m²     Physical Exam  Vitals and nursing note reviewed.   Constitutional:       Appearance: Normal appearance.   HENT:      Head: Normocephalic.   Eyes:      Extraocular Movements: Extraocular movements intact.      Conjunctiva/sclera: Conjunctivae normal.   Cardiovascular:      Rate and Rhythm: Normal rate and regular rhythm.      Heart sounds: Normal heart sounds. No murmur heard.     Pulmonary:      Effort: Pulmonary effort is normal.      Breath sounds: Normal breath sounds. No wheezing or rales.   Abdominal:      General: Bowel sounds are normal.      Palpations: Abdomen is soft.      Tenderness: There is no abdominal tenderness. There is no guarding.   Musculoskeletal:         General: No swelling. Normal range of motion.   Skin:     General: Skin is warm and dry.   Neurological:      General: No focal deficit present.      Mental Status: She is alert. Mental status is at baseline.   Psychiatric:         Mood and Affect: Mood normal.         Thought Content: Thought content normal.        Result Review :  The following data was reviewed by: Alda Borges MD on 09/13/2021:  CMP    CMP 1/5/21 4/12/21 9/10/21 "   Glucose   95   Glucose 81 92    BUN 19 23 17   Creatinine 0.77 0.78 0.72   eGFR Non African Am   79   Sodium 136 138 137   Potassium 4.1 4.4 3.8   Chloride 99 103 104   Calcium 9.9 9.4 9.3   Albumin 4.5 4.1 4.10   Total Bilirubin 0.32 0.37 0.3   Alkaline Phosphatase 83 75 83   AST (SGOT) 29 22 19   ALT (SGPT) 21 17 16           CBC    CBC 1/5/21 4/12/21 9/10/21   WBC 6.92 5.79 6.15   RBC 4.39 4.32 3.98   Hemoglobin 12.5 11.9 (A) 11.3 (A)   Hematocrit 40.1 38.1 34.5   MCV 91.3 88.2 86.7   MCH 28.5 27.5 28.4   MCHC 31.2 (A) 31.2 (A) 32.8   RDW 13.0 13.3 12.4   Platelets 291 271 265   (A) Abnormal value            CBC w/diff    CBC w/Diff 1/5/21 4/12/21 9/10/21   WBC 6.92 5.79 6.15   RBC 4.39 4.32 3.98   Hemoglobin 12.5 11.9 (A) 11.3 (A)   Hematocrit 40.1 38.1 34.5   MCV 91.3 88.2 86.7   MCH 28.5 27.5 28.4   MCHC 31.2 (A) 31.2 (A) 32.8   RDW 13.0 13.3 12.4   Platelets 291 271 265   Neutrophil Rel % 51.9 57.4 51.5   Immature Granulocyte Rel %   0.2   Lymphocyte Rel % 31.5 27.1 31.1   Monocyte Rel % 9.7 10.0 11.7   Eosinophil Rel % 5.5 4.3 4.4   Basophil Rel % 1.0 1.0 1.1   (A) Abnormal value            Lipid Panel    Lipid Panel 1/5/21 4/12/21 9/10/21   Total Cholesterol   191   Total Cholesterol 216 (A) 192    Triglycerides 100 81 84   HDL Cholesterol 71 (A) 64 (A) 67 (A)   VLDL Cholesterol 20 16 15   LDL Cholesterol  125 (A) 112 (A) 109 (A)   LDL/HDL Ratio   1.60   (A) Abnormal value       Comments are available for some flowsheets but are not being displayed.               UA    Urinalysis 1/5/21 4/12/21 4/21/21   Specific [place], UA 1.012 1.020 1.013   Ketones, UA NEGATIVE NEGATIVE NEGATIVE   Blood, UA NEGATIVE NEGATIVE NEGATIVE   Leukocytes, UA MODERATE (A) SMALL (A) SMALL (A)   Nitrite, UA NEGATIVE POSITIVE (A) POSITIVE (A)   RBC, UA NONE SEEN NONE SEEN NONE SEEN   (A) Abnormal value       Comments are available for some flowsheets but are not being displayed.                         Assessment and Plan    Diagnoses and all orders for this visit:    1. Mild persistent asthma without complication (Primary)  Assessment & Plan:  Patient's asthma has been doing fairly well this summer.  Is exacerbated during her ragweed season currently.  She feels the birds every day.  That gives her some exercise also.  She wears her mask if necessary.  She is on Symbicort 160/4.5 twice daily.  She is also on montelukast 10 mg daily.  She takes Zyrtec daily.  She has seen an allergist in the past.    Plan continue the Symbicort if her insurance will pay for.  Otherwise she can check with them and see if they will pay for Breo a little better.  Also continue the montelukast and Zyrtec.  She also on Flonase daily.  Also uses Pataday for allergic conjunctivitis.      2. B12 deficiency  -     Vitamin B12; Future  -     Folate; Future    3. Encounter for long-term (current) use of other medications  -     CBC w AUTO Differential; Future  -     Comprehensive metabolic panel; Future    4. Hyperlipidemia, unspecified hyperlipidemia type  -     Lipid panel; Future    5. Vitamin D deficiency  Assessment & Plan:  Been to use a little low.  She has not been taking her vitamin D on a regular basis.  Plan add vitamin D 50,000 units weekly.  Take it with food.  Be more consistent.    Orders:  -     Vitamin D 25 hydroxy; Future    6. Non-seasonal allergic rhinitis due to pollen    7. RLS (restless legs syndrome)  Assessment & Plan:  Patient has long history of RLS.  She treats with magnesium usually.  She also uses pickle juice or apple cider vinegar which seems to help her.  She does not want any prescription medicine.      8. Attention deficit disorder (ADD) without hyperactivity  Assessment & Plan:  Patient continues on Vyvanse 50 mg capsule daily  Prescriptions are written monthly for the patient.  ADD seems to be well controlled at this time.      Other orders  -     Mammo Screening Bilateral With CAD; Future  -     DEXA Bone Density Axial;  Future        Follow Up Return in about 5 months (around 2/13/2022).  Patient was given instructions and counseling regarding her condition or for health maintenance advice. Please see specific information pulled into the AVS if appropriate.

## 2021-09-13 NOTE — ASSESSMENT & PLAN NOTE
Has a history of recurrent UTIs.  Has not required any biotic recently.  She does call and periodically recheck a urine culture for.

## 2021-09-13 NOTE — ASSESSMENT & PLAN NOTE
Been to use a little low.  She has not been taking her vitamin D on a regular basis.  Plan add vitamin D 50,000 units weekly.  Take it with food.  Be more consistent.

## 2021-09-13 NOTE — ASSESSMENT & PLAN NOTE
Patient continues on Vyvanse 50 mg capsule daily  Prescriptions are written monthly for the patient.  ADD seems to be well controlled at this time.

## 2021-09-16 RX ORDER — CETIRIZINE HYDROCHLORIDE, PSEUDOEPHEDRINE HYDROCHLORIDE 5; 120 MG/1; MG/1
1 TABLET, FILM COATED, EXTENDED RELEASE ORAL 2 TIMES DAILY
Qty: 180 TABLET | Refills: 1 | Status: SHIPPED | OUTPATIENT
Start: 2021-09-16 | End: 2022-01-05 | Stop reason: SDUPTHER

## 2021-09-16 NOTE — TELEPHONE ENCOUNTER
Pt states that she saw you the other day and that she was to get an RX for Zyrtec D, I have gotten this RX ready for you.

## 2021-09-29 DIAGNOSIS — F98.8 ATTENTION DEFICIT DISORDER (ADD) WITHOUT HYPERACTIVITY: ICD-10-CM

## 2021-09-29 RX ORDER — LISDEXAMFETAMINE DIMESYLATE 50 MG
50 CAPSULE ORAL EVERY MORNING
Qty: 30 CAPSULE | Refills: 0 | Status: SHIPPED | OUTPATIENT
Start: 2021-09-29 | End: 2021-11-01 | Stop reason: SDUPTHER

## 2021-09-29 NOTE — TELEPHONE ENCOUNTER
This is ready for you to send in. Thanks.   Can you call pt when this is sent in, because the last time she waited two days and didn't know her medicine was at the pharmacy. Thanks.

## 2021-10-11 ENCOUNTER — HOSPITAL ENCOUNTER (OUTPATIENT)
Dept: GENERAL RADIOLOGY | Facility: HOSPITAL | Age: 76
Discharge: HOME | End: 2021-10-11

## 2021-10-11 ENCOUNTER — OFFICE VISIT (OUTPATIENT)
Dept: INTERNAL MEDICINE | Facility: CLINIC | Age: 76
End: 2021-10-11

## 2021-10-11 VITALS
HEIGHT: 60 IN | SYSTOLIC BLOOD PRESSURE: 120 MMHG | DIASTOLIC BLOOD PRESSURE: 70 MMHG | BODY MASS INDEX: 31.45 KG/M2 | OXYGEN SATURATION: 99 % | HEART RATE: 94 BPM | TEMPERATURE: 98.1 F | WEIGHT: 160.2 LBS

## 2021-10-11 DIAGNOSIS — M25.511 ACUTE PAIN OF RIGHT SHOULDER: Primary | ICD-10-CM

## 2021-10-11 PROCEDURE — 73030 X-RAY EXAM OF SHOULDER: CPT

## 2021-10-11 PROCEDURE — 99213 OFFICE O/P EST LOW 20 MIN: CPT

## 2021-10-11 NOTE — PROGRESS NOTES
"Chief Complaint  Fall (3 weeks ago. bruising from top of right arm down.took tylenol and ibuprofen.heating pad. feels like pain is moving around in arm muscle.)    Subjective          History of Present Illness  Erik Bhatia presents to [facility]  Patient states that she fell when she was trying to catch her cat and ended up slipping and falling into her cabinets. She landed on her right arm and right side of chest. She had a lot of bruising and pain. This occurred 3 weeks ago. She has been taking tylenol and ibuprofen for relief. She switched to the heating pad and that helped. With exertion and certain ROM exercises, pain is elicited. Some days are better than the other. She feels like it is improving. She has not been able to sleep in her bed comfortably.    Objective   Vital Signs:   /70 (BP Location: Left arm, Patient Position: Sitting, Cuff Size: Adult)   Pulse 94   Temp 98.1 °F (36.7 °C) (Temporal)   Ht 152.4 cm (60\")   Wt 72.7 kg (160 lb 3.2 oz)   SpO2 99%   BMI 31.29 kg/m²     Physical Exam  Constitutional:       Appearance: Normal appearance. She is normal weight.   HENT:      Nose: Nose normal.   Eyes:      Extraocular Movements: Extraocular movements intact.      Conjunctiva/sclera: Conjunctivae normal.   Cardiovascular:      Rate and Rhythm: Normal rate and regular rhythm.   Pulmonary:      Effort: Pulmonary effort is normal.      Breath sounds: Normal breath sounds.   Genitourinary:     General: Normal vulva.   Musculoskeletal:         General: Normal range of motion.      Cervical back: Normal range of motion.   Skin:     General: Skin is warm and dry.   Neurological:      Mental Status: She is oriented to person, place, and time.   Psychiatric:         Mood and Affect: Mood normal.         Behavior: Behavior normal.         Thought Content: Thought content normal.         Judgment: Judgment normal.        Result Review :                 Assessment " and Plan    Diagnoses and all orders for this visit:    1. Acute pain of right shoulder (Primary)  Assessment & Plan:  Patient has had right shoulder pain Since a Fall She Sustained Approximately 3 Weeks Ago. She States that she was trying to get her cat and reached over and ended up slipping and falling into a cabinet. She landed on her right side of her chest and right shoulder. She states that she did have quite a bit of bruising and pain. She was taking Tylenol and ibuprofen and that those did help. She did start adding heat and that also helped. She feels like she is improving. She still is having a hard time sleeping in her bed comfortably at night.  Plan: I like to get a shoulder x-ray. I will also given her range of motion exercises to perform at home. She states she will do those fine. I did advise her to decrease her NSAID intake. She may take Tylenol and then supplement with ibuprofen as needed but not as frequent. She knowledge understanding with that. If pain does not improve, we may need to try physical therapy. Sent in some diclofenac gel for her.    Orders:  -     XR Shoulder 2+ View Right    Other orders  -     Diclofenac Sodium (VOLTAREN) 1 % gel gel; Apply 4 g topically to the appropriate area as directed 4 (Four) Times a Day As Needed (right shoulder pain).  Dispense: 150 g; Refill: 1      Follow Up   No follow-ups on file.  Patient was given instructions and counseling regarding her condition or for health maintenance advice. Please see specific information pulled into the AVS if appropriate.

## 2021-10-11 NOTE — PATIENT INSTRUCTIONS
Shoulder Range of Motion Exercises  Shoulder range of motion (ROM) exercises are done to keep the shoulder moving freely or to increase movement. They are often recommended for people who have shoulder pain or stiffness or who are recovering from a shoulder surgery.  Phase 1 exercises  When you are able, do this exercise 1-2 times per day for 30-60 seconds in each direction, or as directed by your health care provider.  Pendulum exercise  To do this exercise while sittin. Sit in a chair or at the edge of your bed with your feet flat on the floor.  2. Let your affected arm hang down in front of you over the edge of the bed or chair.  3. Relax your shoulder, arm, and hand.  4. Rock your body so your arm gently swings in small circles. You can also use your unaffected arm to start the motion.  5. Repeat changing the direction of the circles, swinging your arm left and right, and swinging your arm forward and back.  To do this exercise while standin. Stand next to a sturdy chair or table, and hold on to it with your hand on your unaffected side.  2. Bend forward at the waist.  3. Bend your knees slightly.  4. Relax your shoulder, arm, and hand.  5. While keeping your shoulder relaxed, use body motion to swing your arm in small circles.  6. Repeat changing the direction of the circles, swinging your arm left and right, and swinging your arm forward and back.  7. Between exercises, stand up tall and take a short break to relax your lower back.    Phase 2 exercises  Do these exercises 1-2 times per day or as told by your health care provider. Hold each stretch for 30 seconds, and repeat 3 times. Do the exercises with one or both arms as instructed by your health care provider.  For these exercises, sit at a table with your hand and arm supported by the table. A chair that slides easily or has wheels can be helpful.  External rotation  1. Turn your chair so that your affected side is nearest to the  table.  2. Place your forearm on the table to your side. Bend your elbow about 90° at the elbow (right angle) and place your hand palm facing down on the table. Your elbow should be about 6 inches away from your side.  3. Keeping your arm on the table, lean your body forward.  Abduction  1. Turn your chair so that your affected side is nearest to the table.  2. Place your forearm and hand on the table so that your thumb points toward the ceiling and your arm is straight out to your side.  3. Slide your hand out to the side and away from you, using your unaffected arm to do the work.  4. To increase the stretch, you can slide your chair away from the table.  Flexion: forward stretch  1. Sit facing the table. Place your hand and elbow on the table in front of you.  2. Slide your hand forward and away from you, using your unaffected arm to do the work.  3. To increase the stretch, you can slide your chair backward.  Phase 3 exercises  Do these exercises 1-2 times per day or as told by your health care provider. Hold each stretch for 30 seconds, and repeat 3 times. Do the exercises with one or both arms as instructed by your health care provider.  Cross-body stretch: posterior capsule stretch  1. Lift your arm straight out in front of you.  2. Bend your arm 90° at the elbow (right angle) so your forearm moves across your body.  3. Use your other arm to gently pull the elbow across your body, toward your other shoulder.  Wall climbs  1. Stand with your affected arm extended out to the side with your hand resting on a door frame.  2. Slide your hand slowly up the door frame.  3. To increase the stretch, step through the door frame. Keep your body upright and do not lean.  Wand exercises  You will need a cane, a piece of PVC pipe, or a sturdy wooden dowel for wand exercises.  Flexion  To do this exercise while standin. Hold the wand with both of your hands, palms down.  2. Using the other arm to help, lift your arms  up and over your head, if able.  3. Push upward with your other arm to gently increase the stretch.  To do this exercise while lying down:  1. Lie on your back with your elbows resting on the floor and the wand in both your hands. Your hands will be palm down, or pointing toward your feet.  2. Lift your hands toward the ceiling, using your unaffected arm to help if needed.  3. Bring your arms overhead as able, using your unaffected arm to help if needed.  Internal rotation  1. Stand while holding the wand behind you with both hands. Your unaffected arm should be extended above your head with the arm of the affected side extended behind you at the level of your waist. The wand should be pointing straight up and down as you hold it.  2. Slowly pull the wand up behind your back by straightening the elbow of your unaffected arm and bending the elbow of your affected arm.  External rotation  1. Lie on your back with your affected upper arm supported on a small pillow or rolled towel. When you first do this exercise, keep your upper arm close to your body. Over time, bring your arm up to a 90° angle out to the side.  2. Hold the wand across your stomach and with both hands palm up. Your elbow on your affected side should be bent at a 90° angle.  3. Use your unaffected side to help push your forearm away from you and toward the floor. Keep your elbow on your affected side bent at a 90° angle.  Contact a health care provider if you have:  · New or increasing pain.  · New numbness, tingling, weakness, or discoloration in your arm or hand.  This information is not intended to replace advice given to you by your health care provider. Make sure you discuss any questions you have with your health care provider.  Document Revised: 01/30/2019 Document Reviewed: 01/30/2019  Elsevier Patient Education © 2021 [employer]

## 2021-10-11 NOTE — ASSESSMENT & PLAN NOTE
Patient has had right shoulder pain Since a Fall She Sustained Approximately 3 Weeks Ago. She States that she was trying to get her cat and reached over and ended up slipping and falling into a cabinet. She landed on her right side of her chest and right shoulder. She states that she did have quite a bit of bruising and pain. She was taking Tylenol and ibuprofen and that those did help. She did start adding heat and that also helped. She feels like she is improving. She still is having a hard time sleeping in her bed comfortably at night.  Plan: I like to get a shoulder x-ray. I will also given her range of motion exercises to perform at home. She states she will do those fine. I did advise her to decrease her NSAID intake. She may take Tylenol and then supplement with ibuprofen as needed but not as frequent. She knowledge understanding with that. If pain does not improve, we may need to try physical therapy. Sent in some diclofenac gel for her.

## 2021-11-01 DIAGNOSIS — F98.8 ATTENTION DEFICIT DISORDER (ADD) WITHOUT HYPERACTIVITY: ICD-10-CM

## 2021-11-01 RX ORDER — FLUTICASONE PROPIONATE 50 MCG
2 SPRAY, SUSPENSION (ML) NASAL DAILY
Qty: 45 ML | Refills: 3 | Status: SHIPPED | OUTPATIENT
Start: 2021-11-01 | End: 2022-01-21 | Stop reason: SDUPTHER

## 2021-11-01 RX ORDER — LISDEXAMFETAMINE DIMESYLATE 50 MG
50 CAPSULE ORAL EVERY MORNING
Qty: 30 CAPSULE | Refills: 0 | Status: SHIPPED | OUTPATIENT
Start: 2021-11-01 | End: 2021-12-03 | Stop reason: SDUPTHER

## 2021-11-10 ENCOUNTER — OFFICE VISIT (OUTPATIENT)
Dept: PODIATRY | Facility: CLINIC | Age: 76
End: 2021-11-10

## 2021-11-10 VITALS
HEART RATE: 88 BPM | HEIGHT: 60 IN | OXYGEN SATURATION: 99 % | SYSTOLIC BLOOD PRESSURE: 129 MMHG | BODY MASS INDEX: 31.8 KG/M2 | DIASTOLIC BLOOD PRESSURE: 64 MMHG | WEIGHT: 162 LBS | TEMPERATURE: 97.8 F

## 2021-11-10 DIAGNOSIS — M20.41 HAMMER TOES OF BOTH FEET: ICD-10-CM

## 2021-11-10 DIAGNOSIS — M20.12 VALGUS DEFORMITY OF BOTH GREAT TOES: ICD-10-CM

## 2021-11-10 DIAGNOSIS — M20.11 VALGUS DEFORMITY OF BOTH GREAT TOES: ICD-10-CM

## 2021-11-10 DIAGNOSIS — L89.891 PRESSURE INJURY OF RIGHT FOOT, STAGE 1: ICD-10-CM

## 2021-11-10 DIAGNOSIS — M20.42 HAMMER TOES OF BOTH FEET: ICD-10-CM

## 2021-11-10 DIAGNOSIS — M79.671 FOOT PAIN, RIGHT: Primary | ICD-10-CM

## 2021-11-10 PROCEDURE — 97597 DBRDMT OPN WND 1ST 20 CM/<: CPT | Performed by: PODIATRIST

## 2021-11-10 NOTE — PROGRESS NOTES
[facility]    Today's Date: 11/10/21    Patient Name: Erik Bhatia  MRN: [id number]  CSN: [id number]  PCP: Alda Borges MD, last PCP visit: 1 November 2021  Referring Provider: No ref. provider found    SUBJECTIVE     Chief Complaint   Patient presents with   • Left Foot - Callouses   • Right Foot - Callouses     HPI: Erik Bhatia, a 76 y.o.female, comes presents to clinic with chief complaint of:    New, Established, New Problem: Established    Location:  hyperkeratotic lesion(s) on dorsal right second proximal interphalangeal joint    Duration: 2015    Onset:  gradual    Nature:  sore    Stable, worsening, improving: Stable, recurring    Aggravating factors:  Patient reports lesion(s) is painful with shoegear and ambulation.      Previous Treatment:   Debridement    Past Medical History:   Diagnosis Date   • Arthritis    • Asthma    • Bladder disorder    • Bunion    • Chronic allergic rhinitis    • Corns and callus    • GERD (gastroesophageal reflux disease)    • Hammertoe    • Ingrown toenail    • Limb pain    • Limb swelling    • Numbness in feet    • SOB (shortness of breath)      Past Surgical History:   Procedure Laterality Date   • BREAST BIOPSY     • CARPAL TUNNEL RELEASE     • TRIGGER FINGER RELEASE       Family History   Problem Relation Age of Onset   • Stomach cancer Father    • Diabetes Father    • Diabetes Maternal Grandfather      Social History     Socioeconomic History   • Marital status:    Tobacco Use   • Smoking status: Never Smoker   Vaping Use   • Vaping Use: Never used   Substance and Sexual Activity   • Alcohol use: Never   • Drug use: Never   • Sexual activity: Defer     Allergies   Allergen Reactions   • Seasonal Ic [Kelp-B6-Lecithin-Vinegar] Cough   • Nitrofurantoin Provider Review Needed     Current Outpatient Medications   Medication Sig Dispense Refill   • aluminum hydroxide-magnesium carbonate (Gaviscon)  MG/15ML suspension oral  suspension 1 mL.     • azelastine (Astepro) 0.15 % solution nasal spray Astepro 0.15 % (205.5 mcg) nasal spray,non-aerosol spray 2 sprays (411 mcg) in each nostril by intranasal route once daily   Active     • budesonide-formoterol (Symbicort) 160-4.5 MCG/ACT inhaler Symbicort 160-4.5 mcg/actuation inhalation HFA aerosol inhaler inhale 2 puffs by inhalation route 2 times per day in the morning and evening   Active     • carbonyl iron (FEOSOL) 45 MG tablet tablet Take 1 tablet by mouth Daily. 1 tablet daily     • cetirizine-pseudoephedrine (ZyrTEC-D Allergy & Congestion) 5-120 MG per 12 hr tablet Take 1 tablet by mouth 2 (Two) Times a Day. 180 tablet 1   • Cholecalciferol 125 MCG (5000 UT) tablet Vitamin D3 5,000 unit oral tablet take 1 tablet by oral route daily   Active     • Diclofenac Sodium (VOLTAREN) 1 % gel gel Apply 4 g topically to the appropriate area as directed 4 (Four) Times a Day As Needed (right shoulder pain). 150 g 1   • ergocalciferol (ERGOCALCIFEROL) 1.25 MG (08561 UT) capsule Vitamin D2 50,000 unit oral capsule take 1 capsule (50,000 unit) by oral route once weekly   Suspended     • estradiol (ESTRACE VAGINAL) 0.1 MG/GM vaginal cream 1 g.     • fluticasone (Flonase) 50 MCG/ACT nasal spray 2 sprays into the nostril(s) as directed by provider Daily. 45 mL 3   • montelukast (SINGULAIR) 10 MG tablet montelukast 10 mg oral tablet take 1 tablet (10 mg) by oral route once daily in the evening   Active     • olopatadine (Pataday) 0.2 % solution ophthalmic solution 1 drop.     • omeprazole (priLOSEC) 20 MG capsule omeprazole 20 mg oral capsule,delayed release(DR/EC) take 1 capsule (20 mg) by oral route once daily before a meal   Active     • Polysacch Fe Cmp-Fe Heme Poly (Bifera) 28 MG tablet Feosol Bifera 28 mg oral tablet take 1 tablet by oral route daily   Active     • potassium chloride 10 MEQ CR tablet potassium chloride 10 mEq oral tablet extended release take 2 tablets (20 meq) by oral route once  daily with food   Active     • sulfamethoxazole-trimethoprim (BACTRIM,SEPTRA) 400-80 MG tablet      • triamcinolone (KENALOG) 0.1 % ointment      • Vyvanse 50 MG capsule Take 1 capsule by mouth Every Morning 30 capsule 0     No current facility-administered medications for this visit.     Review of Systems   Skin:        Painful hyperkeratotic lesion   All other systems reviewed and are negative.      OBJECTIVE     Vitals:    11/10/21 1356   BP: 129/64   Pulse: 88   Temp: 97.8 °F (36.6 °C)   SpO2: 99%       Body mass index is 31.64 kg/m².    Lab Results   Component Value Date    GLUCOSE 95 09/10/2021    CALCIUM 9.3 09/10/2021     09/10/2021    K 3.8 09/10/2021    CO2 25.5 09/10/2021     09/10/2021    BUN 17 09/10/2021    CREATININE 0.72 09/10/2021    EGFRIFNONA 79 09/10/2021    BCR 23.6 09/10/2021    ANIONGAP 7.5 09/10/2021       Patient seen in no apparent distress.      PHYSICAL EXAM:     Foot/Ankle Exam:       General:   Appearance: elderly    Orientation: AAOx3    Affect: appropriate    Gait: unimpaired    Shoe Gear:  Casual shoes    VASCULAR      Right Foot Vascularity   Normal vascular exam    Dorsalis pedis:  2+  Posterior tibial:  2+  Skin Temperature: warm    Edema Grading:  None  CFT:  < 3 seconds  Pedal Hair Growth:  Present  Varicosities: none       Left Foot Vascularity   Normal vascular exam    Dorsalis pedis:  2+  Posterior tibial:  2+  Skin Temperature: warm    Edema Grading:  None  CFT:  < 3 seconds  Pedal Hair Growth:  Present  Varicosities: none        NEUROLOGIC     Right Foot Neurologic   Normal sensation    Light touch sensation:  Normal  Vibratory sensation:  Normal  Hot/Cold sensation: normal    Protective Sensation using [place]-Ayush Monofilament:  10     Left Foot Neurologic   Normal sensation    Light touch sensation:  Normal  Vibratory sensation:  Normal  Hot/cold sensation: normal    Protective Sensation using [place]-Ayush Monofilament:  10     MUSCULOSKELETAL       Right Foot Musculoskeletal   Hammertoe:  Second toe, third toe, fourth toe and fifth toe  Hallux valgus: Yes       Left Foot Musculoskeletal   Hammertoe:  Second toe, third toe, fourth toe and fifth toe  Hallux valgus: Yes       MUSCLE STRENGTH     Right Foot Muscle Strength   Normal strength    Foot dorsiflexion:  5  Foot plantar flexion:  5  Foot inversion:  5  Foot eversion:  5     Left Foot Muscle Strength   Foot dorsiflexion:  5  Foot plantar flexion:  5  Foot inversion:  5  Foot eversion:  5     RANGE OF MOTION      Right Foot Range of Motion   Foot and ankle ROM within normal limits       Left Foot Range of Motion   Foot and ankle ROM within normal limits       DERMATOLOGIC     Right Foot Dermatologic   Skin: ulcer    Nails: normal       Left Foot Dermatologic   Skin: skin intact    Nails: normal        Right Foot Additional Comments Stage 1 ulceration on the dorsal right second PIP joint area of the foot.  Hyperkeratotic tissue with subepidermal hemosiderin deposition is present.  No surrounding, edema, erythema, lymphangitis, fluctuance, nor signs of infection.  No drainage present.  13 mm x 10 mm x 3 mm.  This area was debrided via excisional partial thickness debridement with a 10 scalpel blade.  Post debridement measurements were 11 mm x 8 mm x 1 mm in depth.        ASSESSMENT/PLAN     Diagnoses and all orders for this visit:    1. Foot pain, right (Primary)    2. Hammer toes of both feet    3. Valgus deformity of both great toes    4. Pressure injury of right foot, stage 1        Comprehensive lower extremity examination and evaluation was performed.    Discussed findings and treatment plan including risks, benefits, and treatment options with patient in detail. Patient agreed with treatment plan.    An After Visit Summary was printed and given to the patient at discharge, including (if requested) any available informative/educational handouts regarding diagnosis, treatment, or medications. All  questions were answered to patient/family satisfaction. Should symptoms fail to improve or worsen they agree to call or return to clinic or to go to the Emergency Department. Discussed the importance of following up with any needed screening tests/labs/specialist appointments and any requested follow-up recommended by me today. Importance of maintaining follow-up discussed and patient accepts that missed appointments can delay diagnosis and potentially lead to worsening of conditions.    Return in about 9 weeks (around 1/12/2022) for Ulcer Follow-Up., or sooner if acute issues arise.    This document has been electronically signed by Rian Jo DPM on November 10, 2021 14:33 EST

## 2021-12-03 DIAGNOSIS — F98.8 ATTENTION DEFICIT DISORDER (ADD) WITHOUT HYPERACTIVITY: ICD-10-CM

## 2021-12-03 RX ORDER — LISDEXAMFETAMINE DIMESYLATE 50 MG
50 CAPSULE ORAL EVERY MORNING
Qty: 30 CAPSULE | Refills: 0 | Status: SHIPPED | OUTPATIENT
Start: 2021-12-03 | End: 2022-01-05 | Stop reason: SDUPTHER

## 2021-12-16 ENCOUNTER — HOSPITAL ENCOUNTER (OUTPATIENT)
Dept: BONE DENSITY | Facility: HOSPITAL | Age: 76
Discharge: HOME | End: 2021-12-16
Admitting: INTERNAL MEDICINE

## 2021-12-16 ENCOUNTER — HOSPITAL ENCOUNTER (OUTPATIENT)
Dept: MAMMOGRAPHY | Facility: HOSPITAL | Age: 76
Discharge: HOME | End: 2021-12-16
Admitting: INTERNAL MEDICINE

## 2021-12-16 DIAGNOSIS — Z12.31 ENCOUNTER FOR SCREENING MAMMOGRAM FOR BREAST CANCER: ICD-10-CM

## 2021-12-16 DIAGNOSIS — M85.852 OSTEOPENIA OF BOTH HIPS: ICD-10-CM

## 2021-12-16 DIAGNOSIS — M85.851 OSTEOPENIA OF BOTH HIPS: ICD-10-CM

## 2021-12-16 PROCEDURE — 77080 DXA BONE DENSITY AXIAL: CPT

## 2021-12-16 PROCEDURE — 77067 SCR MAMMO BI INCL CAD: CPT

## 2021-12-16 PROCEDURE — 77063 BREAST TOMOSYNTHESIS BI: CPT

## 2022-01-05 DIAGNOSIS — F98.8 ATTENTION DEFICIT DISORDER (ADD) WITHOUT HYPERACTIVITY: ICD-10-CM

## 2022-01-06 RX ORDER — CETIRIZINE HYDROCHLORIDE, PSEUDOEPHEDRINE HYDROCHLORIDE 5; 120 MG/1; MG/1
1 TABLET, FILM COATED, EXTENDED RELEASE ORAL DAILY
Qty: 90 TABLET | Refills: 3 | Status: SHIPPED | OUTPATIENT
Start: 2022-01-06 | End: 2022-12-05 | Stop reason: SDUPTHER

## 2022-01-06 RX ORDER — LISDEXAMFETAMINE DIMESYLATE 50 MG
50 CAPSULE ORAL EVERY MORNING
Qty: 30 CAPSULE | Refills: 0 | Status: SHIPPED | OUTPATIENT
Start: 2022-01-06 | End: 2022-02-07 | Stop reason: SDUPTHER

## 2022-01-24 RX ORDER — FLUTICASONE PROPIONATE 50 MCG
2 SPRAY, SUSPENSION (ML) NASAL DAILY
Qty: 45 ML | Refills: 3 | Status: SHIPPED | OUTPATIENT
Start: 2022-01-24 | End: 2022-04-08 | Stop reason: SDUPTHER

## 2022-02-07 DIAGNOSIS — F98.8 ATTENTION DEFICIT DISORDER (ADD) WITHOUT HYPERACTIVITY: ICD-10-CM

## 2022-02-07 RX ORDER — LISDEXAMFETAMINE DIMESYLATE 50 MG
50 CAPSULE ORAL EVERY MORNING
Qty: 30 CAPSULE | Refills: 0 | Status: SHIPPED | OUTPATIENT
Start: 2022-02-07 | End: 2022-03-10 | Stop reason: SDUPTHER

## 2022-02-16 ENCOUNTER — OFFICE VISIT (OUTPATIENT)
Dept: PODIATRY | Facility: CLINIC | Age: 77
End: 2022-02-16

## 2022-02-16 VITALS
OXYGEN SATURATION: 98 % | DIASTOLIC BLOOD PRESSURE: 54 MMHG | TEMPERATURE: 97.5 F | HEIGHT: 60 IN | HEART RATE: 109 BPM | WEIGHT: 164 LBS | BODY MASS INDEX: 32.2 KG/M2 | SYSTOLIC BLOOD PRESSURE: 130 MMHG

## 2022-02-16 DIAGNOSIS — M79.671 FOOT PAIN, BILATERAL: Primary | ICD-10-CM

## 2022-02-16 DIAGNOSIS — L89.891 PRESSURE INJURY OF RIGHT FOOT, STAGE 1: ICD-10-CM

## 2022-02-16 DIAGNOSIS — L60.0 ONYCHOCRYPTOSIS: ICD-10-CM

## 2022-02-16 DIAGNOSIS — M79.672 FOOT PAIN, BILATERAL: Primary | ICD-10-CM

## 2022-02-16 DIAGNOSIS — B35.1 ONYCHOMYCOSIS: ICD-10-CM

## 2022-02-16 PROCEDURE — 11042 DBRDMT SUBQ TIS 1ST 20SQCM/<: CPT | Performed by: PODIATRIST

## 2022-02-16 PROCEDURE — 11721 DEBRIDE NAIL 6 OR MORE: CPT | Performed by: PODIATRIST

## 2022-02-16 NOTE — PROGRESS NOTES
[facility] [place] - PODIATRY    Today's Date: 02/16/22    Patient Name: Erik Bhatia  MRN: [id number]  CSN: [id number]  PCP: Alda Borges MD, last PCP visit: 7 February 2022  Referring Provider: No ref. provider found    SUBJECTIVE     Chief Complaint   Patient presents with   • Left Foot - Nail Problem   • Right Foot - Nail Problem     HPI: Erki Bhatia, a 76 y.o.female, comes presents to clinic with chief complaint of:    New, Established, New Problem: Established    Location:  hyperkeratotic lesion(s) on dorsal right second proximal interphalangeal joint    Duration: 2015    Onset:  gradual    Nature:  sore    Stable, worsening, improving: Stable, recurring    Aggravating factors:  Patient reports lesion(s) is painful with shoegear and ambulation.      Previous Treatment:   Debridement  ---------------------------  New, Established, New Problem: New    Location: Toenails    Duration:  Greater than five years    Onset: Gradual    Nature: sore with palpation.    Stable, worsening, improving: Worsening    Aggravating factors: Pain with shoe gear and ambulation.    Previous Treatment:  unable to trim their own toenails.    Patient denies any fevers, chills, nausea, vomiting, shortness of breathe, nor any other constitutional signs nor symptoms.        Past Medical History:   Diagnosis Date   • Arthritis    • Asthma    • Bladder disorder    • Bunion    • Chronic allergic rhinitis    • Corns and callus    • GERD (gastroesophageal reflux disease)    • Hammertoe    • Ingrown toenail    • Limb pain    • Limb swelling    • Numbness in feet    • SOB (shortness of breath)      Past Surgical History:   Procedure Laterality Date   • BREAST BIOPSY     • CARPAL TUNNEL RELEASE     • TRIGGER FINGER RELEASE       Family History   Problem Relation Age of Onset   • Stomach cancer Father    • Diabetes Father    • Diabetes Maternal Grandfather      Social History     Socioeconomic History   • Marital status:     Tobacco Use   • Smoking status: Never Smoker   • Smokeless tobacco: Never Used   Vaping Use   • Vaping Use: Never used   Substance and Sexual Activity   • Alcohol use: Never   • Drug use: Never   • Sexual activity: Defer     Allergies   Allergen Reactions   • Seasonal Ic [Kelp-B6-Lecithin-Vinegar] Cough   • Nitrofurantoin Provider Review Needed     Current Outpatient Medications   Medication Sig Dispense Refill   • aluminum hydroxide-magnesium carbonate (Gaviscon)  MG/15ML suspension oral suspension 1 mL.     • azelastine (Astepro) 0.15 % solution nasal spray Astepro 0.15 % (205.5 mcg) nasal spray,non-aerosol spray 2 sprays (411 mcg) in each nostril by intranasal route once daily   Active     • budesonide-formoterol (Symbicort) 160-4.5 MCG/ACT inhaler Symbicort 160-4.5 mcg/actuation inhalation HFA aerosol inhaler inhale 2 puffs by inhalation route 2 times per day in the morning and evening   Active     • carbonyl iron (FEOSOL) 45 MG tablet tablet Take 1 tablet by mouth Daily. 1 tablet daily     • cetirizine-pseudoephedrine (ZyrTEC-D Allergy & Congestion) 5-120 MG per 12 hr tablet Take 1 tablet by mouth Daily. 90 tablet 3   • Cholecalciferol 125 MCG (5000 UT) tablet Vitamin D3 5,000 unit oral tablet take 1 tablet by oral route daily   Active     • Diclofenac Sodium (VOLTAREN) 1 % gel gel Apply 4 g topically to the appropriate area as directed 4 (Four) Times a Day As Needed (right shoulder pain). 150 g 1   • ergocalciferol (ERGOCALCIFEROL) 1.25 MG (77597 UT) capsule Vitamin D2 50,000 unit oral capsule take 1 capsule (50,000 unit) by oral route once weekly   Suspended     • estradiol (ESTRACE VAGINAL) 0.1 MG/GM vaginal cream 1 g.     • fluticasone (Flonase) 50 MCG/ACT nasal spray 2 sprays into the nostril(s) as directed by provider Daily. 45 mL 3   • montelukast (SINGULAIR) 10 MG tablet montelukast 10 mg oral tablet take 1 tablet (10 mg) by oral route once daily in the evening   Active     • olopatadine  (Pataday) 0.2 % solution ophthalmic solution 1 drop.     • omeprazole (priLOSEC) 20 MG capsule omeprazole 20 mg oral capsule,delayed release(DR/EC) take 1 capsule (20 mg) by oral route once daily before a meal   Active     • Polysacch Fe Cmp-Fe Heme Poly (Bifera) 28 MG tablet Feosol Bifera 28 mg oral tablet take 1 tablet by oral route daily   Active     • potassium chloride 10 MEQ CR tablet potassium chloride 10 mEq oral tablet extended release take 2 tablets (20 meq) by oral route once daily with food   Active     • sulfamethoxazole-trimethoprim (BACTRIM,SEPTRA) 400-80 MG tablet      • triamcinolone (KENALOG) 0.1 % ointment      • Vyvanse 50 MG capsule Take 1 capsule by mouth Every Morning 30 capsule 0     No current facility-administered medications for this visit.     Review of Systems   Constitutional: Negative.    Skin:        Painful hyperkeratotic lesion and toenails   All other systems reviewed and are negative.      OBJECTIVE     Vitals:    02/16/22 1056   BP: 130/54   Pulse: 109   Temp: 97.5 °F (36.4 °C)   SpO2: 98%       Body mass index is 32.03 kg/m².    Lab Results   Component Value Date    GLUCOSE 95 09/10/2021    CALCIUM 9.3 09/10/2021     09/10/2021    K 3.8 09/10/2021    CO2 25.5 09/10/2021     09/10/2021    BUN 17 09/10/2021    CREATININE 0.72 09/10/2021    EGFRIFNONA 79 09/10/2021    BCR 23.6 09/10/2021    ANIONGAP 7.5 09/10/2021       Patient seen in no apparent distress.      PHYSICAL EXAM:     Foot/Ankle Exam:       General:   Appearance: elderly    Orientation: AAOx3    Affect: appropriate    Gait: unimpaired    Shoe Gear:  Casual shoes    VASCULAR      Right Foot Vascularity   Normal vascular exam    Dorsalis pedis:  2+  Posterior tibial:  2+  Skin Temperature: warm    Edema Grading:  None  CFT:  < 3 seconds  Pedal Hair Growth:  Present  Varicosities: none       Left Foot Vascularity   Normal vascular exam    Dorsalis pedis:  2+  Posterior tibial:  2+  Skin Temperature: warm     Edema Grading:  None  CFT:  < 3 seconds  Pedal Hair Growth:  Present  Varicosities: none        NEUROLOGIC     Right Foot Neurologic   Normal sensation    Light touch sensation:  Normal  Vibratory sensation:  Normal  Hot/Cold sensation: normal    Protective Sensation using Murphys-Ayush Monofilament:  10     Left Foot Neurologic   Normal sensation    Light touch sensation:  Normal  Vibratory sensation:  Normal  Hot/cold sensation: normal    Protective Sensation using Murphys-Ayush Monofilament:  10     MUSCULOSKELETAL      Right Foot Musculoskeletal   Hammertoe:  Second toe, third toe, fourth toe and fifth toe  Hallux valgus: Yes       Left Foot Musculoskeletal   Hammertoe:  Second toe, third toe, fourth toe and fifth toe  Hallux valgus: Yes       MUSCLE STRENGTH     Right Foot Muscle Strength   Normal strength    Foot dorsiflexion:  5  Foot plantar flexion:  5  Foot inversion:  5  Foot eversion:  5     Left Foot Muscle Strength   Foot dorsiflexion:  5  Foot plantar flexion:  5  Foot inversion:  5  Foot eversion:  5     RANGE OF MOTION      Right Foot Range of Motion   Foot and ankle ROM within normal limits       Left Foot Range of Motion   Foot and ankle ROM within normal limits       DERMATOLOGIC     Right Foot Dermatologic   Skin: ulcer    Nails: onychomycosis, abnormally thick, subungual debris, dystrophic nails and ingrown toenail    Nails comment:  Toenails 1, 2, 3, 4, and 5     Left Foot Dermatologic   Skin: skin intact    Nails: onychomycosis, abnormally thick, subungual debris, dystrophic nails and ingrown toenail    Nails comment:  Toenails 1, 2, 3, 4, and 5      Right Foot Additional Comments Stage 1 ulceration on the dorsal right second PIP joint area of the foot.  Hyperkeratotic tissue with subepidermal hemosiderin deposition is present.  No surrounding, edema, erythema, lymphangitis, fluctuance, nor signs of infection.  No drainage present.  12 mm x 9 mm x 3 mm.  This area was debrided via  excisional partial thickness debridement with a 15 scalpel blade.  Post debridement measurements were 10 mm x 7 mm x 1 mm in depth.        ASSESSMENT/PLAN     Diagnoses and all orders for this visit:    1. Foot pain, bilateral (Primary)    2. Onychomycosis    3. Onychocryptosis    4. Pressure injury of right foot, stage 1        Comprehensive lower extremity examination and evaluation was performed.    Discussed findings and treatment plan including risks, benefits, and treatment options with patient in detail. Patient agreed with treatment plan.    Toenails 1 through 5 bilaterally were debrided in thickness and length with toenail tremors.  These were then smoothed with a dermal sanding bur.  Patient tolerated the procedure well.    An After Visit Summary was printed and given to the patient at discharge, including (if requested) any available informative/educational handouts regarding diagnosis, treatment, or medications. All questions were answered to patient/family satisfaction. Should symptoms fail to improve or worsen they agree to call or return to clinic or to go to the Emergency Department. Discussed the importance of following up with any needed screening tests/labs/specialist appointments and any requested follow-up recommended by me today. Importance of maintaining follow-up discussed and patient accepts that missed appointments can delay diagnosis and potentially lead to worsening of conditions.    Return in about 9 weeks (around 4/20/2022) for Toenail Care, Ulcer Follow-Up., or sooner if acute issues arise.    This document has been electronically signed by Rian Jo DPM on February 16, 2022 11:10 EST

## 2022-03-10 DIAGNOSIS — F98.8 ATTENTION DEFICIT DISORDER (ADD) WITHOUT HYPERACTIVITY: ICD-10-CM

## 2022-03-11 ENCOUNTER — TELEPHONE (OUTPATIENT)
Dept: INTERNAL MEDICINE | Facility: CLINIC | Age: 77
End: 2022-03-11

## 2022-03-11 RX ORDER — LISDEXAMFETAMINE DIMESYLATE 50 MG
50 CAPSULE ORAL EVERY MORNING
Qty: 30 CAPSULE | Refills: 0 | Status: SHIPPED | OUTPATIENT
Start: 2022-03-11 | End: 2022-04-08 | Stop reason: SDUPTHER

## 2022-03-11 NOTE — TELEPHONE ENCOUNTER
Caller: Erik Bhatia    Relationship to patient: Self    Best call back number: [phone]      Patient is needing: PATIENT IS CALLING IN REGARDS TO HER REFILL ON VYVANSE 50MG. THERE IS TWO REQUESTS MADE. ONE FOR BRAND NAME AND GENERIC. PATIENT STATES SHE HAS TO HAVE BRAND NAME VYVANSE AND NOT THE GENERIC   Lisdexamfetamine Dimesylate

## 2022-03-14 NOTE — TELEPHONE ENCOUNTER
CALLED AND LET PHARMACY KNOW THAT SEVERAL ATTEMPTS HAVE BEEN MADE FOR THEM TO FILL BRAND NAME ONLY AND 2 REFILL REQUEST HAVE BEEN MADE ON FOR BOTH AND MEDICATION STILL HAS NOT BEEN FILLED.

## 2022-03-18 ENCOUNTER — LAB (OUTPATIENT)
Dept: LAB | Facility: HOSPITAL | Age: 77
End: 2022-03-18

## 2022-03-18 DIAGNOSIS — E78.5 HYPERLIPIDEMIA, UNSPECIFIED HYPERLIPIDEMIA TYPE: ICD-10-CM

## 2022-03-18 DIAGNOSIS — E55.9 VITAMIN D DEFICIENCY: ICD-10-CM

## 2022-03-18 DIAGNOSIS — Z79.899 ENCOUNTER FOR LONG-TERM (CURRENT) USE OF OTHER MEDICATIONS: ICD-10-CM

## 2022-03-18 DIAGNOSIS — E61.1 IRON DEFICIENCY: ICD-10-CM

## 2022-03-18 DIAGNOSIS — D50.9 IRON DEFICIENCY ANEMIA, UNSPECIFIED IRON DEFICIENCY ANEMIA TYPE: ICD-10-CM

## 2022-03-18 DIAGNOSIS — E53.8 B12 DEFICIENCY: ICD-10-CM

## 2022-03-18 LAB
25(OH)D3 SERPL-MCNC: 32 NG/ML (ref 30–100)
ALBUMIN SERPL-MCNC: 4.1 G/DL (ref 3.5–5.2)
ALBUMIN/GLOB SERPL: 1.3 G/DL
ALP SERPL-CCNC: 89 U/L (ref 39–117)
ALT SERPL W P-5'-P-CCNC: 14 U/L (ref 1–33)
ANION GAP SERPL CALCULATED.3IONS-SCNC: 11.1 MMOL/L (ref 5–15)
AST SERPL-CCNC: 20 U/L (ref 1–32)
BASOPHILS # BLD AUTO: 0.08 10*3/MM3 (ref 0–0.2)
BASOPHILS NFR BLD AUTO: 1.1 % (ref 0–1.5)
BILIRUB SERPL-MCNC: 0.3 MG/DL (ref 0–1.2)
BUN SERPL-MCNC: 21 MG/DL (ref 8–23)
BUN/CREAT SERPL: 25.3 (ref 7–25)
CALCIUM SPEC-SCNC: 9.6 MG/DL (ref 8.6–10.5)
CHLORIDE SERPL-SCNC: 102 MMOL/L (ref 98–107)
CHOLEST SERPL-MCNC: 202 MG/DL (ref 0–200)
CO2 SERPL-SCNC: 25.9 MMOL/L (ref 22–29)
CREAT SERPL-MCNC: 0.83 MG/DL (ref 0.57–1)
DEPRECATED RDW RBC AUTO: 40.6 FL (ref 37–54)
EGFRCR SERPLBLD CKD-EPI 2021: 72.7 ML/MIN/1.73
EOSINOPHIL # BLD AUTO: 0.29 10*3/MM3 (ref 0–0.4)
EOSINOPHIL NFR BLD AUTO: 4.1 % (ref 0.3–6.2)
ERYTHROCYTE [DISTWIDTH] IN BLOOD BY AUTOMATED COUNT: 13.2 % (ref 12.3–15.4)
FERRITIN SERPL-MCNC: 19.1 NG/ML (ref 13–150)
FOLATE SERPL-MCNC: 14 NG/ML (ref 4.78–24.2)
GLOBULIN UR ELPH-MCNC: 3.2 GM/DL
GLUCOSE SERPL-MCNC: 109 MG/DL (ref 65–99)
HCT VFR BLD AUTO: 36.2 % (ref 34–46.6)
HDLC SERPL-MCNC: 73 MG/DL (ref 40–60)
HGB BLD-MCNC: 11.4 G/DL (ref 12–15.9)
IMM GRANULOCYTES # BLD AUTO: 0.02 10*3/MM3 (ref 0–0.05)
IMM GRANULOCYTES NFR BLD AUTO: 0.3 % (ref 0–0.5)
IRON 24H UR-MRATE: 48 MCG/DL (ref 37–145)
IRON SATN MFR SERPL: 9 % (ref 20–50)
LDLC SERPL CALC-MCNC: 120 MG/DL (ref 0–100)
LDLC/HDLC SERPL: 1.63 {RATIO}
LYMPHOCYTES # BLD AUTO: 1.56 10*3/MM3 (ref 0.7–3.1)
LYMPHOCYTES NFR BLD AUTO: 22.2 % (ref 19.6–45.3)
MCH RBC QN AUTO: 26.3 PG (ref 26.6–33)
MCHC RBC AUTO-ENTMCNC: 31.5 G/DL (ref 31.5–35.7)
MCV RBC AUTO: 83.4 FL (ref 79–97)
MONOCYTES # BLD AUTO: 0.64 10*3/MM3 (ref 0.1–0.9)
MONOCYTES NFR BLD AUTO: 9.1 % (ref 5–12)
NEUTROPHILS NFR BLD AUTO: 4.43 10*3/MM3 (ref 1.7–7)
NEUTROPHILS NFR BLD AUTO: 63.2 % (ref 42.7–76)
NRBC BLD AUTO-RTO: 0 /100 WBC (ref 0–0.2)
PLATELET # BLD AUTO: 274 10*3/MM3 (ref 140–450)
PMV BLD AUTO: 10.4 FL (ref 6–12)
POTASSIUM SERPL-SCNC: 4.4 MMOL/L (ref 3.5–5.2)
PROT SERPL-MCNC: 7.3 G/DL (ref 6–8.5)
RBC # BLD AUTO: 4.34 10*6/MM3 (ref 3.77–5.28)
SODIUM SERPL-SCNC: 139 MMOL/L (ref 136–145)
TIBC SERPL-MCNC: 520 MCG/DL (ref 298–536)
TRANSFERRIN SERPL-MCNC: 349 MG/DL (ref 200–360)
TRIGL SERPL-MCNC: 51 MG/DL (ref 0–150)
VIT B12 BLD-MCNC: 1128 PG/ML (ref 211–946)
VLDLC SERPL-MCNC: 9 MG/DL (ref 5–40)
WBC NRBC COR # BLD: 7.02 10*3/MM3 (ref 3.4–10.8)

## 2022-03-18 PROCEDURE — 82607 VITAMIN B-12: CPT

## 2022-03-18 PROCEDURE — 82728 ASSAY OF FERRITIN: CPT

## 2022-03-18 PROCEDURE — 83540 ASSAY OF IRON: CPT

## 2022-03-18 PROCEDURE — 82746 ASSAY OF FOLIC ACID SERUM: CPT

## 2022-03-18 PROCEDURE — 36415 COLL VENOUS BLD VENIPUNCTURE: CPT

## 2022-03-18 PROCEDURE — 80061 LIPID PANEL: CPT

## 2022-03-18 PROCEDURE — 84466 ASSAY OF TRANSFERRIN: CPT

## 2022-03-18 PROCEDURE — 82306 VITAMIN D 25 HYDROXY: CPT

## 2022-03-18 PROCEDURE — 85025 COMPLETE CBC W/AUTO DIFF WBC: CPT

## 2022-03-18 PROCEDURE — 80053 COMPREHEN METABOLIC PANEL: CPT

## 2022-03-21 ENCOUNTER — OFFICE VISIT (OUTPATIENT)
Dept: INTERNAL MEDICINE | Facility: CLINIC | Age: 77
End: 2022-03-21

## 2022-03-21 VITALS
OXYGEN SATURATION: 97 % | TEMPERATURE: 98.2 F | HEIGHT: 60 IN | SYSTOLIC BLOOD PRESSURE: 160 MMHG | DIASTOLIC BLOOD PRESSURE: 92 MMHG | BODY MASS INDEX: 32.28 KG/M2 | HEART RATE: 104 BPM | WEIGHT: 164.4 LBS

## 2022-03-21 DIAGNOSIS — E78.5 HYPERLIPIDEMIA, UNSPECIFIED HYPERLIPIDEMIA TYPE: ICD-10-CM

## 2022-03-21 DIAGNOSIS — J45.20 MILD INTERMITTENT ASTHMA WITHOUT COMPLICATION: ICD-10-CM

## 2022-03-21 DIAGNOSIS — F98.8 ATTENTION DEFICIT DISORDER (ADD) WITHOUT HYPERACTIVITY: ICD-10-CM

## 2022-03-21 DIAGNOSIS — K21.9 GASTROESOPHAGEAL REFLUX DISEASE WITHOUT ESOPHAGITIS: ICD-10-CM

## 2022-03-21 DIAGNOSIS — M19.90 ARTHRITIS: ICD-10-CM

## 2022-03-21 DIAGNOSIS — R63.4 WEIGHT LOSS: ICD-10-CM

## 2022-03-21 DIAGNOSIS — R53.83 FATIGUE, UNSPECIFIED TYPE: ICD-10-CM

## 2022-03-21 DIAGNOSIS — Z11.59 NEED FOR HEPATITIS C SCREENING TEST: ICD-10-CM

## 2022-03-21 DIAGNOSIS — R74.8 ELEVATED VITAMIN B12 LEVEL: ICD-10-CM

## 2022-03-21 DIAGNOSIS — E55.9 VITAMIN D DEFICIENCY: Primary | ICD-10-CM

## 2022-03-21 DIAGNOSIS — E87.6 HYPOKALEMIA: ICD-10-CM

## 2022-03-21 DIAGNOSIS — E83.42 HYPOMAGNESEMIA: ICD-10-CM

## 2022-03-21 DIAGNOSIS — D50.8 IRON DEFICIENCY ANEMIA DUE TO DIETARY CAUSES: ICD-10-CM

## 2022-03-21 PROCEDURE — 99214 OFFICE O/P EST MOD 30 MIN: CPT | Performed by: INTERNAL MEDICINE

## 2022-03-21 RX ORDER — CETIRIZINE HYDROCHLORIDE 10 MG/1
5 TABLET ORAL ONCE
Status: CANCELLED | OUTPATIENT
Start: 2022-03-23 | End: 2022-03-23

## 2022-03-21 RX ORDER — ACETAMINOPHEN 325 MG/1
650 TABLET ORAL ONCE
Status: CANCELLED | OUTPATIENT
Start: 2022-03-23

## 2022-03-21 RX ORDER — SODIUM CHLORIDE 9 MG/ML
100 INJECTION, SOLUTION INTRAVENOUS CONTINUOUS
Status: CANCELLED | OUTPATIENT
Start: 2022-03-23

## 2022-03-21 RX ORDER — DIPHENHYDRAMINE HCL 25 MG
25 CAPSULE ORAL ONCE
Status: CANCELLED | OUTPATIENT
Start: 2022-03-23

## 2022-03-21 RX ORDER — DIPHENHYDRAMINE HYDROCHLORIDE 50 MG/ML
50 INJECTION INTRAMUSCULAR; INTRAVENOUS AS NEEDED
Status: CANCELLED | OUTPATIENT
Start: 2022-03-23

## 2022-03-21 RX ORDER — PROCHLORPERAZINE MALEATE 5 MG/1
10 TABLET ORAL ONCE
Status: CANCELLED | OUTPATIENT
Start: 2022-03-23 | End: 2022-03-23

## 2022-03-22 DIAGNOSIS — D50.9 IRON DEFICIENCY ANEMIA, UNSPECIFIED IRON DEFICIENCY ANEMIA TYPE: Primary | ICD-10-CM

## 2022-03-22 PROBLEM — K21.9 GASTROESOPHAGEAL REFLUX DISEASE WITHOUT ESOPHAGITIS: Status: ACTIVE | Noted: 2022-03-22

## 2022-03-22 NOTE — PROGRESS NOTES
"Chief Complaint  Labs Only (Pt states that this is a routine visit, overall states her health is well and doesn't have any new issues. /She has been rushing this morning and feels that it why her BP and HR is up.)    Subjective  Patient overall has been feeling well.  She was rushing this morning and felt that that was the reason her blood pressure was elevated.    Erik Bhatia presents to [facility]  History of Present Illness  Patient with ADD diagnosed many years ago by psychiatry and stable on current meds.  History of iron deficiency due to dietary causes.  Up-to-date on colonoscopies prep.  GERD.  Vitamin D deficient controlled.  Seasonal perennial allergies.  Mild asthma controlled.  Objective   Vital Signs:   /92 (BP Location: Left arm, Patient Position: Sitting, Cuff Size: Adult)   Pulse 104   Temp 98.2 °F (36.8 °C)   Ht 152.4 cm (60\")   Wt 74.6 kg (164 lb 6.4 oz)   SpO2 97%   BMI 32.11 kg/m²     Physical Exam  Vitals and nursing note reviewed.   Constitutional:       Appearance: Normal appearance.   HENT:      Head: Normocephalic.   Eyes:      Extraocular Movements: Extraocular movements intact.      Conjunctiva/sclera: Conjunctivae normal.   Cardiovascular:      Rate and Rhythm: Normal rate and regular rhythm.      Heart sounds: Normal heart sounds. No murmur heard.  Pulmonary:      Effort: Pulmonary effort is normal.      Breath sounds: Normal breath sounds. No wheezing or rales.   Abdominal:      General: Bowel sounds are normal.      Palpations: Abdomen is soft.      Tenderness: There is no abdominal tenderness. There is no guarding.   Musculoskeletal:         General: No swelling. Normal range of motion.   Skin:     General: Skin is warm and dry.   Neurological:      General: No focal deficit present.      Mental Status: She is alert. Mental status is at baseline.   Psychiatric:         Mood and Affect: Mood normal.         Thought Content: Thought " content normal.        Result Review :  The following data was reviewed by: Alda Borges MD on 03/21/2022:  CMP    CMP 4/12/21 9/10/21 3/18/22   Glucose 92 95 109 (A)   BUN 23 17 21   Creatinine 0.78 0.72 0.83   eGFR Non African Am  79    Sodium 138 137 139   Potassium 4.4 3.8 4.4   Chloride 103 104 102   Calcium 9.4 9.3 9.6   Albumin 4.1 4.10 4.10   Total Bilirubin 0.37 0.3 0.3   Alkaline Phosphatase 75 83 89   AST (SGOT) 22 19 20   ALT (SGPT) 17 16 14   (A) Abnormal value            CBC w/diff    CBC w/Diff 4/12/21 9/10/21 3/18/22   WBC 5.79 6.15 7.02   RBC 4.32 3.98 4.34   Hemoglobin 11.9 (A) 11.3 (A) 11.4 (A)   Hematocrit 38.1 34.5 36.2   MCV 88.2 86.7 83.4   MCH 27.5 28.4 26.3 (A)   MCHC 31.2 (A) 32.8 31.5   RDW 13.3 12.4 13.2   Platelets 271 265 274   Neutrophil Rel % 57.4 51.5 63.2   Immature Granulocyte Rel %  0.2 0.3   Lymphocyte Rel % 27.1 31.1 22.2   Monocyte Rel % 10.0 11.7 9.1   Eosinophil Rel % 4.3 4.4 4.1   Basophil Rel % 1.0 1.1 1.1   (A) Abnormal value            Lipid Panel    Lipid Panel 4/12/21 9/10/21 3/18/22   Total Cholesterol  191 202 (A)   Total Cholesterol 192     Triglycerides 81 84 51   HDL Cholesterol 64 (A) 67 (A) 73 (A)   VLDL Cholesterol 16 15 9   LDL Cholesterol  112 (A) 109 (A) 120 (A)   LDL/HDL Ratio  1.60 1.63   (A) Abnormal value       Comments are available for some flowsheets but are not being displayed.               UA    Urinalysis 4/12/21 4/21/21   Specific [place], UA 1.020 1.013   Ketones, UA NEGATIVE NEGATIVE   Blood, UA NEGATIVE NEGATIVE   Leukocytes, UA SMALL (A) SMALL (A)   Nitrite, UA POSITIVE (A) POSITIVE (A)   RBC, UA NONE SEEN NONE SEEN   (A) Abnormal value       Comments are available for some flowsheets but are not being displayed.                         Assessment and Plan   Diagnoses and all orders for this visit:    1. Vitamin D deficiency (Primary)  -     Vitamin D 25 hydroxy; Future    2. Gastroesophageal reflux disease without esophagitis  Assessment  & Plan:  Patient remains on Prilosec 20 mg daily for GERD.  Plan: Continue Prilosec daily.  Try decreasing to Pepcid 20 twice daily if tolerated at least part of the time.    Orders:  -     CBC w AUTO Differential; Future  -     Comprehensive metabolic panel; Future    3. Arthritis  -     Comprehensive metabolic panel; Future    4. Hypokalemia  -     Comprehensive metabolic panel; Future    5. Elevated vitamin B12 level  -     Vitamin B12; Future  -     Folate; Future    6. Hypomagnesemia  -     Magnesium; Future    7. Hyperlipidemia, unspecified hyperlipidemia type  -     Lipid panel; Future    8. Fatigue, unspecified type  -     TSH+Free T4; Future    9. Weight loss  -     TSH+Free T4; Future    10. Need for hepatitis C screening test  -     Hepatitis C antibody; Future    11. Iron deficiency anemia due to dietary causes  Assessment & Plan:  Iron stores are slightly low again.  She has not been taken Feosol on a regular basis.  Patient up-to-date on colonoscopies.  Assessment: Iron deficiency due to inadequate dietary intake  Plan: IV iron infusion.  Resume Feosol 45 mg daily.  Iron profile on return the office.    Orders:  -     ferric carboxymaltose (INJECTAFER) injection solution 750 mg  -     Iron Profile; Future    12. Attention deficit disorder (ADD) without hyperactivity  Assessment & Plan:  Patient feels that her ADD is controlled with Vyvanase at present dose.  Assessment: ADD stable for a prolonged period of time with current dose.  Plan: Continue 50 mg daily.      13. Mild intermittent asthma without complication  Assessment & Plan:  Asthma has been doing well.  Occasionally when she is outside she has to use albuterol rescue inhaler.  Assessment environmental allergies and asthma.  Plan: Continue Symbicort 160-4.52 puffs twice daily, albuterol rescue inhaler as needed, Singulair 10 mg daily, Flonase 2 sniffs daily, Zyrtec-D which she has taken and tolerated for a long time.  Has seen allergist in the  past.  None recent.              Follow Up No follow-ups on file.  Patient was given instructions and counseling regarding her condition or for health maintenance advice. Please see specific information pulled into the AVS if appropriate.

## 2022-03-22 NOTE — ASSESSMENT & PLAN NOTE
Iron stores are slightly low again.  She has not been taken Feosol on a regular basis.  Patient up-to-date on colonoscopies.  Assessment: Iron deficiency due to inadequate dietary intake  Plan: IV iron infusion.  Resume Feosol 45 mg daily.  Iron profile on return the office.

## 2022-03-22 NOTE — ASSESSMENT & PLAN NOTE
Patient feels that her ADD is controlled with Vyvanase at present dose.  Assessment: ADD stable for a prolonged period of time with current dose.  Plan: Continue 50 mg daily.

## 2022-03-22 NOTE — ASSESSMENT & PLAN NOTE
Asthma has been doing well.  Occasionally when she is outside she has to use albuterol rescue inhaler.  Assessment environmental allergies and asthma.  Plan: Continue Symbicort 160-4.52 puffs twice daily, albuterol rescue inhaler as needed, Singulair 10 mg daily, Flonase 2 sniffs daily, Zyrtec-D which she has taken and tolerated for a long time.  Has seen allergist in the past.  None recent.

## 2022-03-30 ENCOUNTER — HOSPITAL ENCOUNTER (OUTPATIENT)
Dept: INFUSION THERAPY | Facility: HOSPITAL | Age: 77
Setting detail: INFUSION SERIES
Discharge: HOME | End: 2022-03-30

## 2022-03-30 VITALS
BODY MASS INDEX: 32.59 KG/M2 | HEIGHT: 60 IN | SYSTOLIC BLOOD PRESSURE: 119 MMHG | WEIGHT: 166.01 LBS | HEART RATE: 85 BPM | TEMPERATURE: 97.6 F | DIASTOLIC BLOOD PRESSURE: 57 MMHG | OXYGEN SATURATION: 96 % | RESPIRATION RATE: 20 BRPM

## 2022-03-30 DIAGNOSIS — D50.8 IRON DEFICIENCY ANEMIA DUE TO DIETARY CAUSES: Primary | ICD-10-CM

## 2022-03-30 PROCEDURE — 25010000002 FERRIC CARBOXYMALTOSE 750 MG/15ML SOLUTION: Performed by: INTERNAL MEDICINE

## 2022-03-30 PROCEDURE — 96365 THER/PROPH/DIAG IV INF INIT: CPT

## 2022-03-30 RX ORDER — ACETAMINOPHEN 325 MG/1
650 TABLET ORAL ONCE
OUTPATIENT
Start: 2022-04-06

## 2022-03-30 RX ORDER — METHYLPREDNISOLONE SODIUM SUCCINATE 125 MG/2ML
125 INJECTION, POWDER, LYOPHILIZED, FOR SOLUTION INTRAMUSCULAR; INTRAVENOUS AS NEEDED
OUTPATIENT
Start: 2022-04-06

## 2022-03-30 RX ORDER — SODIUM CHLORIDE 9 MG/ML
100 INJECTION, SOLUTION INTRAVENOUS CONTINUOUS
OUTPATIENT
Start: 2022-04-06

## 2022-03-30 RX ORDER — DIPHENHYDRAMINE HYDROCHLORIDE 50 MG/ML
50 INJECTION INTRAMUSCULAR; INTRAVENOUS AS NEEDED
OUTPATIENT
Start: 2022-04-06

## 2022-03-30 RX ORDER — DIPHENHYDRAMINE HCL 25 MG
25 CAPSULE ORAL ONCE
OUTPATIENT
Start: 2022-04-06

## 2022-03-30 RX ORDER — CETIRIZINE HYDROCHLORIDE 10 MG/1
5 TABLET ORAL ONCE
OUTPATIENT
Start: 2022-04-06 | End: 2022-04-06

## 2022-03-30 RX ORDER — PROCHLORPERAZINE MALEATE 5 MG/1
10 TABLET ORAL ONCE
OUTPATIENT
Start: 2022-04-06 | End: 2022-04-06

## 2022-03-30 RX ADMIN — FERRIC CARBOXYMALTOSE INJECTION 750 MG: 50 INJECTION, SOLUTION INTRAVENOUS at 11:25

## 2022-04-06 ENCOUNTER — HOSPITAL ENCOUNTER (OUTPATIENT)
Dept: INFUSION THERAPY | Facility: HOSPITAL | Age: 77
Setting detail: INFUSION SERIES
Discharge: HOME | End: 2022-04-06

## 2022-04-06 VITALS
WEIGHT: 165.34 LBS | BODY MASS INDEX: 32.46 KG/M2 | OXYGEN SATURATION: 100 % | HEART RATE: 84 BPM | TEMPERATURE: 97.7 F | HEIGHT: 60 IN | RESPIRATION RATE: 20 BRPM | DIASTOLIC BLOOD PRESSURE: 68 MMHG | SYSTOLIC BLOOD PRESSURE: 125 MMHG

## 2022-04-06 DIAGNOSIS — D50.8 IRON DEFICIENCY ANEMIA DUE TO DIETARY CAUSES: Primary | ICD-10-CM

## 2022-04-06 PROCEDURE — 96365 THER/PROPH/DIAG IV INF INIT: CPT

## 2022-04-06 PROCEDURE — 25010000002 FERRIC CARBOXYMALTOSE 750 MG/15ML SOLUTION: Performed by: INTERNAL MEDICINE

## 2022-04-06 PROCEDURE — 96374 THER/PROPH/DIAG INJ IV PUSH: CPT

## 2022-04-06 RX ORDER — ACETAMINOPHEN 325 MG/1
650 TABLET ORAL ONCE
OUTPATIENT
Start: 2022-04-13

## 2022-04-06 RX ORDER — DIPHENHYDRAMINE HCL 25 MG
25 CAPSULE ORAL ONCE
OUTPATIENT
Start: 2022-04-13

## 2022-04-06 RX ORDER — DIPHENHYDRAMINE HYDROCHLORIDE 50 MG/ML
50 INJECTION INTRAMUSCULAR; INTRAVENOUS AS NEEDED
OUTPATIENT
Start: 2022-04-13

## 2022-04-06 RX ORDER — CETIRIZINE HYDROCHLORIDE 10 MG/1
5 TABLET ORAL ONCE
OUTPATIENT
Start: 2022-04-13 | End: 2022-04-13

## 2022-04-06 RX ORDER — PROCHLORPERAZINE MALEATE 5 MG/1
10 TABLET ORAL ONCE
OUTPATIENT
Start: 2022-04-13 | End: 2022-04-13

## 2022-04-06 RX ORDER — METHYLPREDNISOLONE SODIUM SUCCINATE 125 MG/2ML
125 INJECTION, POWDER, LYOPHILIZED, FOR SOLUTION INTRAMUSCULAR; INTRAVENOUS AS NEEDED
OUTPATIENT
Start: 2022-04-13

## 2022-04-06 RX ORDER — SODIUM CHLORIDE 9 MG/ML
100 INJECTION, SOLUTION INTRAVENOUS CONTINUOUS
OUTPATIENT
Start: 2022-04-13

## 2022-04-06 RX ADMIN — FERRIC CARBOXYMALTOSE INJECTION 750 MG: 50 INJECTION, SOLUTION INTRAVENOUS at 10:57

## 2022-04-08 DIAGNOSIS — M19.90 ARTHRITIS: Primary | ICD-10-CM

## 2022-04-08 DIAGNOSIS — F98.8 ATTENTION DEFICIT DISORDER (ADD) WITHOUT HYPERACTIVITY: ICD-10-CM

## 2022-04-12 RX ORDER — FLUTICASONE PROPIONATE 50 MCG
2 SPRAY, SUSPENSION (ML) NASAL DAILY
Qty: 45 ML | Refills: 3 | Status: SHIPPED | OUTPATIENT
Start: 2022-04-12

## 2022-04-12 RX ORDER — LISDEXAMFETAMINE DIMESYLATE 50 MG
50 CAPSULE ORAL EVERY MORNING
Qty: 30 CAPSULE | Refills: 0 | Status: SHIPPED | OUTPATIENT
Start: 2022-04-12 | End: 2022-05-11 | Stop reason: SDUPTHER

## 2022-04-13 ENCOUNTER — OFFICE VISIT (OUTPATIENT)
Dept: OBSTETRICS AND GYNECOLOGY | Facility: CLINIC | Age: 77
End: 2022-04-13

## 2022-04-13 VITALS
SYSTOLIC BLOOD PRESSURE: 132 MMHG | HEART RATE: 99 BPM | HEIGHT: 60 IN | BODY MASS INDEX: 31.8 KG/M2 | DIASTOLIC BLOOD PRESSURE: 73 MMHG | WEIGHT: 162 LBS

## 2022-04-13 DIAGNOSIS — Z12.31 ENCOUNTER FOR SCREENING MAMMOGRAM FOR MALIGNANT NEOPLASM OF BREAST: ICD-10-CM

## 2022-04-13 DIAGNOSIS — N95.2 VAGINAL ATROPHY: ICD-10-CM

## 2022-04-13 DIAGNOSIS — Z01.419 WELL WOMAN EXAM WITH ROUTINE GYNECOLOGICAL EXAM: Primary | ICD-10-CM

## 2022-04-13 PROCEDURE — G0101 CA SCREEN;PELVIC/BREAST EXAM: HCPCS | Performed by: OBSTETRICS & GYNECOLOGY

## 2022-04-13 RX ORDER — ESTRADIOL 0.1 MG/G
1 CREAM VAGINAL 2 TIMES WEEKLY
Qty: 42.5 G | Refills: 3 | Status: SHIPPED | OUTPATIENT
Start: 2022-04-14 | End: 2023-02-24 | Stop reason: SDUPTHER

## 2022-04-13 NOTE — ASSESSMENT & PLAN NOTE
Patient has been on Estrace cream for years  We discussed the risks and benefits of continued localized vaginal estrogen  Patient will continue Estrace 1 g per vagina 2 times a week  Prescription was sent to pharmacy

## 2022-04-13 NOTE — PROGRESS NOTES
"Well Woman Visit    CC: WWE     Myriad intake: No  Previously Qualified? NO   Tobacco/Nicotine use:  No    HPI:   77 y.o. Contraception or HRT: Post menopausal    Pt has no complaints today.      History: PMHx, Meds, Allergies, PSHx, Social Hx, and POBHx all reviewed and updated.  PCP:Alda Borges MD     Review of Systems     /73   Pulse 99   Ht 152.4 cm (60\")   Wt 73.5 kg (162 lb)   BMI 31.64 kg/m²     Physical Exam  Vitals and nursing note reviewed. Exam conducted with a chaperone present.   Constitutional:       Appearance: Normal appearance.   Neck:      Thyroid: No thyroid mass or thyromegaly.   Cardiovascular:      Rate and Rhythm: Normal rate and regular rhythm.      Heart sounds: Normal heart sounds.   Pulmonary:      Effort: Pulmonary effort is normal.      Breath sounds: Normal breath sounds.   Chest:   Breasts:      Right: Normal. No mass, nipple discharge or skin change.      Left: Normal. No mass, nipple discharge or skin change.       Abdominal:      General: Abdomen is flat. Bowel sounds are normal.      Palpations: Abdomen is soft.   Genitourinary:     General: Normal vulva.      Exam position: Lithotomy position.      Labia:         Right: No lesion.         Left: No lesion.       Urethra: No prolapse or urethral lesion.      Vagina: No lesions ( Moderate vaginal atrophy with narrowing of the introitus).      Cervix: Normal.      Uterus: Normal.       Adnexa: Right adnexa normal and left adnexa normal.      Rectum: External hemorrhoid present.   Musculoskeletal:      Cervical back: Full passive range of motion without pain.   Neurological:      Mental Status: She is alert.         ASSESSMENT AND PLAN:  WWE    Diagnoses and all orders for this visit:    1. Well woman exam with routine gynecological exam (Primary)    2. Encounter for screening mammogram for malignant neoplasm of breast  -     Mammo Screening Digital Tomosynthesis Bilateral With CAD; Future    3. Vaginal " atrophy  Assessment & Plan:  Patient has been on Estrace cream for years  We discussed the risks and benefits of continued localized vaginal estrogen  Patient will continue Estrace 1 g per vagina 2 times a week  Prescription was sent to pharmacy    Orders:  -     estradiol (ESTRACE) 0.1 MG/GM vaginal cream; Insert 1 g into the vagina 2 (Two) Times a Week.  Dispense: 42.5 g; Refill: 3        Counseling:       Recommend daily calcium and vitamin D  Recommend weightbearing exercise  Discussed self breast exams  Preventative:   MMG      s/p COVID vaccine        She understands the importance of having any ordered tests to be performed in a timely fashion.  The risks of not performing them include, but are not limited to, advanced cancer stages, bone loss from osteoporosis and/or subsequent increase in morbidity and/or mortality.  She is encouraged to review her results online and/or contact or office if she has questions.     Follow Up:  Return in about 1 year (around 4/13/2023).        Maya Lagunas MD  04/13/2022

## 2022-05-09 ENCOUNTER — TELEPHONE (OUTPATIENT)
Dept: OBSTETRICS AND GYNECOLOGY | Facility: CLINIC | Age: 77
End: 2022-05-09

## 2022-05-09 RX ORDER — OMEPRAZOLE 20 MG/1
CAPSULE, DELAYED RELEASE ORAL
Qty: 90 CAPSULE | Refills: 3 | Status: SHIPPED | OUTPATIENT
Start: 2022-05-09

## 2022-05-09 RX ORDER — MONTELUKAST SODIUM 10 MG/1
TABLET ORAL
Qty: 90 TABLET | Refills: 3 | Status: SHIPPED | OUTPATIENT
Start: 2022-05-09

## 2022-05-09 RX ORDER — POTASSIUM CHLORIDE 750 MG/1
CAPSULE, EXTENDED RELEASE ORAL
Qty: 180 CAPSULE | Refills: 3 | Status: SHIPPED | OUTPATIENT
Start: 2022-05-09

## 2022-05-11 DIAGNOSIS — F98.8 ATTENTION DEFICIT DISORDER (ADD) WITHOUT HYPERACTIVITY: ICD-10-CM

## 2022-05-11 RX ORDER — BUDESONIDE AND FORMOTEROL FUMARATE DIHYDRATE 160; 4.5 UG/1; UG/1
2 AEROSOL RESPIRATORY (INHALATION)
Qty: 10.2 G | Refills: 1 | Status: SHIPPED | OUTPATIENT
Start: 2022-05-11 | End: 2022-07-07 | Stop reason: SDUPTHER

## 2022-05-11 RX ORDER — LISDEXAMFETAMINE DIMESYLATE 50 MG
50 CAPSULE ORAL EVERY MORNING
Qty: 30 CAPSULE | Refills: 0 | Status: SHIPPED | OUTPATIENT
Start: 2022-05-11 | End: 2022-06-09 | Stop reason: SDUPTHER

## 2022-05-11 NOTE — TELEPHONE ENCOUNTER
Caller: Erik Bhatia    Relationship: Self    Best call back number: [phone]    Requested Prescriptions:   Requested Prescriptions     Pending Prescriptions Disp Refills   • budesonide-formoterol (SYMBICORT) 160-4.5 MCG/ACT inhaler     • Vyvanse 50 MG capsule 30 capsule 0     Sig: Take 1 capsule by mouth Every Morning        Pharmacy where request should be sent: [facility] [address], [place] - [address] - [phone] [facility] [phone] [facility]     Additional details provided by patient: THE PRESCRIPTION FOR THE SYMBICORT MUST BE SPECIFIED FOR BRAND NAME ONLY    Does the patient have less than a 3 day supply:  [x] Yes  [] No    Rosanna France Rep   05/11/22 11:14 EDT

## 2022-05-25 ENCOUNTER — OFFICE VISIT (OUTPATIENT)
Dept: PODIATRY | Facility: CLINIC | Age: 77
End: 2022-05-25

## 2022-05-25 VITALS
HEART RATE: 105 BPM | OXYGEN SATURATION: 95 % | TEMPERATURE: 97.5 F | BODY MASS INDEX: 32 KG/M2 | SYSTOLIC BLOOD PRESSURE: 162 MMHG | DIASTOLIC BLOOD PRESSURE: 62 MMHG | HEIGHT: 60 IN | WEIGHT: 163 LBS

## 2022-05-25 DIAGNOSIS — M20.11 VALGUS DEFORMITY OF BOTH GREAT TOES: Primary | ICD-10-CM

## 2022-05-25 DIAGNOSIS — B35.1 ONYCHOMYCOSIS: ICD-10-CM

## 2022-05-25 DIAGNOSIS — L60.0 ONYCHOCRYPTOSIS: ICD-10-CM

## 2022-05-25 DIAGNOSIS — M79.672 FOOT PAIN, BILATERAL: ICD-10-CM

## 2022-05-25 DIAGNOSIS — L89.891 PRESSURE INJURY OF RIGHT FOOT, STAGE 1: ICD-10-CM

## 2022-05-25 DIAGNOSIS — M20.41 HAMMER TOES OF BOTH FEET: ICD-10-CM

## 2022-05-25 DIAGNOSIS — M20.42 HAMMER TOES OF BOTH FEET: ICD-10-CM

## 2022-05-25 DIAGNOSIS — L89.891 PRESSURE INJURY OF TOE OF RIGHT FOOT, STAGE 1: ICD-10-CM

## 2022-05-25 DIAGNOSIS — M20.12 VALGUS DEFORMITY OF BOTH GREAT TOES: Primary | ICD-10-CM

## 2022-05-25 DIAGNOSIS — M79.671 FOOT PAIN, BILATERAL: ICD-10-CM

## 2022-05-25 PROCEDURE — 97597 DBRDMT OPN WND 1ST 20 CM/<: CPT | Performed by: PODIATRIST

## 2022-05-25 PROCEDURE — 11721 DEBRIDE NAIL 6 OR MORE: CPT | Performed by: PODIATRIST

## 2022-05-25 NOTE — PROGRESS NOTES
[facility] - PODIATRY    Today's Date: 05/25/22    Patient Name: Erik Bhatia  MRN: [id number]  CSN: [id number]  PCP: Sheyla Elder APRN, last PCP visit: 11 March 2022  Referring Provider: No ref. provider found    SUBJECTIVE     Chief Complaint   Patient presents with   • Left Foot - Nail Problem   • Right Foot - Nail Problem     HPI: Erik Bhatia, a 77 y.o.female, comes presents to clinic with chief complaint of:    New, Established, New Problem: Established    Location:  hyperkeratotic lesion(s) on dorsal right second proximal interphalangeal joint    Duration: 2015    Onset:  gradual    Nature:  sore    Stable, worsening, improving: Stable, recurring    Aggravating factors:  Patient reports lesion(s) is painful with shoegear and ambulation.      Previous Treatment:   Debridement  ---------------------------  New, Established, New Problem: est    Location: Toenails    Duration:  Greater than five years    Onset: Gradual    Nature: sore with palpation.    Stable, worsening, improving: Worsening    Aggravating factors: Pain with shoe gear and ambulation.    Previous Treatment:  debridement    Patient denies any fevers, chills, nausea, vomiting, shortness of breathe, nor any other constitutional signs nor symptoms.    Past Medical History:   Diagnosis Date   • Arthritis    • Asthma    • Bladder disorder    • Bunion    • Chronic allergic rhinitis    • Corns and callus    • GERD (gastroesophageal reflux disease)    • Hammertoe    • Ingrown toenail    • Limb pain    • Limb swelling    • Numbness in feet    • SOB (shortness of breath)      Past Surgical History:   Procedure Laterality Date   • BREAST BIOPSY     • CARPAL TUNNEL RELEASE     • TRIGGER FINGER RELEASE       Family History   Problem Relation Age of Onset   • Stomach cancer Father    • Diabetes Father    • Diabetes Maternal Grandfather      Social History     Socioeconomic History   • Marital status:    Tobacco Use   • Smoking  status: Never Smoker   • Smokeless tobacco: Never Used   Vaping Use   • Vaping Use: Never used   Substance and Sexual Activity   • Alcohol use: Never   • Drug use: Never   • Sexual activity: Not Currently     Partners: Male     Allergies   Allergen Reactions   • Seasonal Ic [Kelp-B6-Lecithin-Vinegar] Cough   • Nitrofurantoin Provider Review Needed     Current Outpatient Medications   Medication Sig Dispense Refill   • aluminum hydroxide-magnesium carbonate (Gaviscon)  MG/15ML suspension oral suspension 1 mL.     • budesonide-formoterol (SYMBICORT) 160-4.5 MCG/ACT inhaler Inhale 2 puffs 2 (Two) Times a Day. 10.2 g 1   • carbonyl iron (FEOSOL) 45 MG tablet tablet Take 1 tablet by mouth Daily. 1 tablet daily     • cetirizine-pseudoephedrine (ZyrTEC-D Allergy & Congestion) 5-120 MG per 12 hr tablet Take 1 tablet by mouth Daily. 90 tablet 3   • Cholecalciferol 125 MCG (5000 UT) tablet 1 tablet Daily.     • Diclofenac Sodium (VOLTAREN) 1 % gel gel Apply 4 g topically to the appropriate area as directed 4 (Four) Times a Day As Needed (right shoulder pain). 150 g 1   • estradiol (ESTRACE) 0.1 MG/GM vaginal cream Insert 1 g into the vagina 2 (Two) Times a Week. 42.5 g 3   • fluticasone (Flonase) 50 MCG/ACT nasal spray 2 sprays into the nostril(s) as directed by provider Daily. 45 mL 3   • montelukast (SINGULAIR) 10 MG tablet TAKE 1 TABLET DAILY 90 tablet 3   • olopatadine (PATADAY) 0.2 % solution ophthalmic solution 1 drop.     • omeprazole (priLOSEC) 20 MG capsule TAKE 1 CAPSULE DAILY 90 capsule 3   • Polysacch Fe Cmp-Fe Heme Poly (Bifera) 28 MG tablet Feosol Bifera 28 mg oral tablet take 1 tablet by oral route daily   Active     • potassium chloride (MICRO-K) 10 MEQ CR capsule TAKE 2 CAPSULES DAILY. 180 capsule 3   • potassium chloride 10 MEQ CR tablet potassium chloride 10 mEq oral tablet extended release take 2 tablets (20 meq) by oral route once daily with food   Active     • triamcinolone (KENALOG) 0.1 % ointment       • Vyvanse 50 MG capsule Take 1 capsule by mouth Every Morning 30 capsule 0     Current Facility-Administered Medications   Medication Dose Route Frequency Provider Last Rate Last Admin   • ferric carboxymaltose (INJECTAFER) injection solution 750 mg  750 mg Intravenous Once Alda Borges MD         Review of Systems   Constitutional: Negative.    Skin:        Painful hyperkeratotic lesion and toenails   All other systems reviewed and are negative.      OBJECTIVE     Vitals:    05/25/22 1104   BP: 162/62   Pulse: 105   Temp: 97.5 °F (36.4 °C)   SpO2: 95%       Body mass index is 31.83 kg/m².    Lab Results   Component Value Date    GLUCOSE 109 (H) 03/18/2022    CALCIUM 9.6 03/18/2022     03/18/2022    K 4.4 03/18/2022    CO2 25.9 03/18/2022     03/18/2022    BUN 21 03/18/2022    CREATININE 0.83 03/18/2022    EGFRIFNONA 79 09/10/2021    BCR 25.3 (H) 03/18/2022    ANIONGAP 11.1 03/18/2022       Patient seen in no apparent distress.      PHYSICAL EXAM:     Foot/Ankle Exam:       General:   Appearance: elderly    Orientation: AAOx3    Affect: appropriate    Gait: unimpaired    Shoe Gear:  Casual shoes    VASCULAR      Right Foot Vascularity   Normal vascular exam    Dorsalis pedis:  2+  Posterior tibial:  2+  Skin Temperature: warm    Edema Grading:  None  CFT:  < 3 seconds  Pedal Hair Growth:  Present  Varicosities: none       Left Foot Vascularity   Normal vascular exam    Dorsalis pedis:  2+  Posterior tibial:  2+  Skin Temperature: warm    Edema Grading:  None  CFT:  < 3 seconds  Pedal Hair Growth:  Present  Varicosities: none        NEUROLOGIC     Right Foot Neurologic   Normal sensation    Light touch sensation:  Normal  Vibratory sensation:  Normal  Hot/Cold sensation: normal    Protective Sensation using [place]-Ayush Monofilament:  10     Left Foot Neurologic   Normal sensation    Light touch sensation:  Normal  Vibratory sensation:  Normal  Hot/cold sensation: normal    Protective  Sensation using [place]-Ayush Monofilament:  10     MUSCULOSKELETAL      Right Foot Musculoskeletal   Hammertoe:  Second toe, third toe, fourth toe and fifth toe  Hallux valgus: Yes       Left Foot Musculoskeletal   Hammertoe:  Second toe, third toe, fourth toe and fifth toe  Hallux valgus: Yes       MUSCLE STRENGTH     Right Foot Muscle Strength   Normal strength    Foot dorsiflexion:  5  Foot plantar flexion:  5  Foot inversion:  5  Foot eversion:  5     Left Foot Muscle Strength   Foot dorsiflexion:  5  Foot plantar flexion:  5  Foot inversion:  5  Foot eversion:  5     RANGE OF MOTION      Right Foot Range of Motion   Foot and ankle ROM within normal limits       Left Foot Range of Motion   Foot and ankle ROM within normal limits       DERMATOLOGIC     Right Foot Dermatologic   Skin: ulcer    Nails: onychomycosis, abnormally thick, subungual debris, dystrophic nails and ingrown toenail    Nails comment:  Toenails 1, 2, 3, 4, and 5     Left Foot Dermatologic   Skin: skin intact    Nails: onychomycosis, abnormally thick, subungual debris, dystrophic nails and ingrown toenail    Nails comment:  Toenails 1, 2, 3, 4, and 5      Right Foot Additional Comments Stage 1 ulceration on the dorsal right second PIP joint area of the foot.  Hyperkeratotic tissue with subepidermal hemosiderin deposition is present.  No surrounding, edema, erythema, lymphangitis, fluctuance, nor signs of infection.  No drainage present.  11 mm x 9 mm x 3 mm.  This area was debrided via excisional partial thickness debridement with a 15 scalpel blade.  Post debridement measurements were 10 mm x 6 mm x 1 mm in depth.        ASSESSMENT/PLAN     Diagnoses and all orders for this visit:    1. 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 Foot pain, bilateral    3. Onychocryptosis    4. Onychomycosis    5. Hammer toes of both feet    6. Pressure injury of right foot, stage 1    7. Pressure injury of toe of right foot, stage  1        Comprehensive lower extremity examination and evaluation was performed.    Discussed findings and treatment plan including risks, benefits, and treatment options with patient in detail. Patient agreed with treatment plan.    Toenails 1 through 5 bilaterally were debrided in thickness and length with toenail tremors.  These were then smoothed with a dermal sanding bur.  Patient tolerated the procedure well.    An After Visit Summary was printed and given to the patient at discharge, including (if requested) any available informative/educational handouts regarding diagnosis, treatment, or medications. All questions were answered to patient/family satisfaction. Should symptoms fail to improve or worsen they agree to call or return to clinic or to go to the Emergency Department. Discussed the importance of following up with any needed screening tests/labs/specialist appointments and any requested follow-up recommended by me today. Importance of maintaining follow-up discussed and patient accepts that missed appointments can delay diagnosis and potentially lead to worsening of conditions.    Return in about 9 weeks (around 7/27/2022) for Toenail Care., or sooner if acute issues arise.    This document has been electronically signed by Rian Jo DPM on May 25, 2022 11:14 EDT

## 2022-06-08 ENCOUNTER — OFFICE VISIT (OUTPATIENT)
Dept: INTERNAL MEDICINE | Facility: CLINIC | Age: 77
End: 2022-06-08

## 2022-06-08 VITALS
SYSTOLIC BLOOD PRESSURE: 125 MMHG | WEIGHT: 166.4 LBS | BODY MASS INDEX: 32.67 KG/M2 | HEIGHT: 60 IN | OXYGEN SATURATION: 94 % | HEART RATE: 86 BPM | DIASTOLIC BLOOD PRESSURE: 79 MMHG | TEMPERATURE: 97.3 F

## 2022-06-08 DIAGNOSIS — J01.01 ACUTE RECURRENT MAXILLARY SINUSITIS: Primary | ICD-10-CM

## 2022-06-08 PROBLEM — J31.0 CHRONIC RHINITIS: Status: ACTIVE | Noted: 2022-06-08

## 2022-06-08 PROCEDURE — 99213 OFFICE O/P EST LOW 20 MIN: CPT | Performed by: INTERNAL MEDICINE

## 2022-06-08 RX ORDER — AMOXICILLIN AND CLAVULANATE POTASSIUM 875; 125 MG/1; MG/1
1 TABLET, FILM COATED ORAL 2 TIMES DAILY
Qty: 20 TABLET | Refills: 0 | Status: SHIPPED | OUTPATIENT
Start: 2022-06-08 | End: 2022-08-10

## 2022-06-08 NOTE — PROGRESS NOTES
"CHIEF COMPLAINT  Erik Bhatia presents to [facility] for follow-up of Follow-up (PT is having very bad Sinus Pain.  PT has been using a Neti Pot for her Sinuses. It has been helping some. PT is having a bad Headache with the Sinuses.).    HPI  77-year-old pleasant female here with the above complaints.  Records reviewed from previous providers, labs and medicines reviewed.  Patient has multiple medical problems such as vitamin D deficiency, reflux ,arthritis, hyperlipidemia, iron deficiency anemia recurrent requiring IV iron infusion now on Feosol 45 mg daily, also ADD HD, and mild intermittent asthma stable with Symbicort albuterol, Singulair, Flonase, Zyrtec-D.    \"I think I have a sinus infection\"  C/o 2 weeks ago cough -dry,sinus pressure, headache, more PND and congestion--no fever or chills.    SH-former  at [facility] x 33 yrs    PFSH reviewed.  ROS significant for sinus pressure and headache along with congestion and postnasal drainage    OBJECTIVE  Vital Signs  Vitals:    06/08/22 1346   BP: 125/79   BP Location: Left arm   Patient Position: Sitting   Cuff Size: Adult   Pulse: 86   Temp: 97.3 °F (36.3 °C)   TempSrc: Temporal   SpO2: 94%   Weight: 75.5 kg (166 lb 6.4 oz)   Height: 152.4 cm (60\")      Body mass index is 32.5 kg/m².    Physical Exam  Vitals and nursing note reviewed.   Constitutional:       Appearance: Normal appearance.   HENT:      Head: Normocephalic and atraumatic.      Comments: Tender maxillary sinuses     Right Ear: Tympanic membrane normal.      Left Ear: Tympanic membrane normal.   Cardiovascular:      Rate and Rhythm: Normal rate and regular rhythm.   Pulmonary:      Effort: Pulmonary effort is normal.      Breath sounds: Normal breath sounds.   Abdominal:      General: Abdomen is flat.      Palpations: Abdomen is soft.   Musculoskeletal:      Cervical back: Normal range of motion and neck supple.   Neurological:      General: No " focal deficit present.      Mental Status: She is alert and oriented to person, place, and time.   Psychiatric:         Mood and Affect: Mood normal.         Behavior: Behavior normal.          RESULTS REVIEW  No results found for: PROBNP, BNP  CMP    CMP 9/10/21 3/18/22   Glucose 95 109 (A)   BUN 17 21   Creatinine 0.72 0.83   eGFR Non African Am 79    Sodium 137 139   Potassium 3.8 4.4   Chloride 104 102   Calcium 9.3 9.6   Albumin 4.10 4.10   Total Bilirubin 0.3 0.3   Alkaline Phosphatase 83 89   AST (SGOT) 19 20   ALT (SGPT) 16 14   (A) Abnormal value            CBC w/diff    CBC w/Diff 9/10/21 3/18/22   WBC 6.15 7.02   RBC 3.98 4.34   Hemoglobin 11.3 (A) 11.4 (A)   Hematocrit 34.5 36.2   MCV 86.7 83.4   MCH 28.4 26.3 (A)   MCHC 32.8 31.5   RDW 12.4 13.2   Platelets 265 274   Neutrophil Rel % 51.5 63.2   Immature Granulocyte Rel % 0.2 0.3   Lymphocyte Rel % 31.1 22.2   Monocyte Rel % 11.7 9.1   Eosinophil Rel % 4.4 4.1   Basophil Rel % 1.1 1.1   (A) Abnormal value             Lipid Panel    Lipid Panel 9/10/21 3/18/22   Total Cholesterol 191 202 (A)   Triglycerides 84 51   HDL Cholesterol 67 (A) 73 (A)   VLDL Cholesterol 15 9   LDL Cholesterol  109 (A) 120 (A)   LDL/HDL Ratio 1.60 1.63   (A) Abnormal value             Lab Results   Component Value Date    TSH 2.060 10/16/2019    TSH 2.550 05/29/2019      Lab Results   Component Value Date    FREET4 1.1 10/16/2019    FREET4 1.1 05/29/2019         Lab Results   Component Value Date    MGFOSTRA84 1,128 (H) 03/18/2022    HUDB19GC 32.0 03/18/2022    MG 2.02 04/12/2021        No Images in the past 120 days found..             ASSESSMENT & PLAN  Diagnoses and all orders for this visit:    1. Acute recurrent maxillary sinusitis (Primary)  -     amoxicillin-clavulanate (Augmentin) 875-125 MG per tablet; Take 1 tablet by mouth 2 (Two) Times a Day.  Dispense: 20 tablet; Refill: 0          FOLLOW UP  Return if symptoms worsen or fail to improve, for Next scheduled follow  up.    Patient was given instructions and counseling regarding her condition or for health maintenance advice. Please see specific information pulled into the AVS if appropriate.

## 2022-06-09 DIAGNOSIS — F98.8 ATTENTION DEFICIT DISORDER (ADD) WITHOUT HYPERACTIVITY: ICD-10-CM

## 2022-06-11 RX ORDER — LISDEXAMFETAMINE DIMESYLATE 50 MG
50 CAPSULE ORAL EVERY MORNING
Qty: 30 CAPSULE | Refills: 0 | Status: SHIPPED | OUTPATIENT
Start: 2022-06-11 | End: 2022-07-07 | Stop reason: SDUPTHER

## 2022-07-07 DIAGNOSIS — F98.8 ATTENTION DEFICIT DISORDER (ADD) WITHOUT HYPERACTIVITY: ICD-10-CM

## 2022-07-07 RX ORDER — BUDESONIDE AND FORMOTEROL FUMARATE DIHYDRATE 160; 4.5 UG/1; UG/1
2 AEROSOL RESPIRATORY (INHALATION)
Qty: 10.2 G | Refills: 1 | Status: SHIPPED | OUTPATIENT
Start: 2022-07-07 | End: 2022-11-21 | Stop reason: SDUPTHER

## 2022-07-11 ENCOUNTER — TELEMEDICINE (OUTPATIENT)
Dept: INTERNAL MEDICINE | Facility: CLINIC | Age: 77
End: 2022-07-11

## 2022-07-11 DIAGNOSIS — N76.0 ACUTE VAGINITIS: ICD-10-CM

## 2022-07-11 DIAGNOSIS — U07.1 COVID-19 VIRUS INFECTION: Primary | ICD-10-CM

## 2022-07-11 PROCEDURE — 99213 OFFICE O/P EST LOW 20 MIN: CPT | Performed by: INTERNAL MEDICINE

## 2022-07-11 RX ORDER — BENZONATATE 100 MG/1
100 CAPSULE ORAL 3 TIMES DAILY PRN
Qty: 30 CAPSULE | Refills: 0 | Status: SHIPPED | OUTPATIENT
Start: 2022-07-11 | End: 2022-07-21 | Stop reason: SDUPTHER

## 2022-07-11 RX ORDER — METHYLPREDNISOLONE 4 MG/1
TABLET ORAL
Qty: 21 TABLET | Refills: 0 | Status: SHIPPED | OUTPATIENT
Start: 2022-07-11 | End: 2022-07-17

## 2022-07-11 RX ORDER — FLUCONAZOLE 150 MG/1
TABLET ORAL
Qty: 2 TABLET | Refills: 0 | Status: SHIPPED | OUTPATIENT
Start: 2022-07-11 | End: 2022-08-10

## 2022-07-11 NOTE — PROGRESS NOTES
"This was an audio and video enabled telemedicine encounter. Patient consents to telehealth visit.    CHIEF COMPLAINT  Erik Bhatia presents to [facility] for follow-up of Covid-19 Home Monitoring Video Visit (PT TESTED POSITIVE AT HOME. ).    HPI    Tried to FT patient x 4 without success-and office has tried several times without success  Finally reached pt    Took home covid test 2 days ago and +. Had hoarseness, sneezing, PND, cough--thought it was her allergy BUT had a \"terrible HA\".which started 4 days ago. Felt tired 2 days ago. Brother was with pt 5 days ago and was + and so pt tested herself 2 days ago.  HAD  BAD HA AGAIN AND TOOK 2 TYL-JUST TIRED today. Occasional wheeezing.NO FEVER,  YELLOW PHLEGM TODAYX1       PFSH reviewed.  ROS -cough, wheezing, corzya, PND, HA, no fever    OBJECTIVE  Vital Signs  There were no vitals filed for this visit.   There is no height or weight on file to calculate BMI.    Physical Exam  Vitals and nursing note reviewed.   Constitutional:       Appearance: Normal appearance.   HENT:      Head: Normocephalic and atraumatic.   Cardiovascular:      Rate and Rhythm: Normal rate and regular rhythm.   Pulmonary:      Effort: Pulmonary effort is normal.      Breath sounds: Normal breath sounds.   Abdominal:      Palpations: Abdomen is soft.   Musculoskeletal:      Cervical back: Normal range of motion and neck supple.   Neurological:      General: No focal deficit present.      Mental Status: She is alert and oriented to person, place, and time.          RESULTS REVIEW  No results found for: PROBNP, BNP  CMP    CMP 9/10/21 3/18/22   Glucose 95 109 (A)   BUN 17 21   Creatinine 0.72 0.83   eGFR Non African Am 79    Sodium 137 139   Potassium 3.8 4.4   Chloride 104 102   Calcium 9.3 9.6   Albumin 4.10 4.10   Total Bilirubin 0.3 0.3   Alkaline Phosphatase 83 89   AST (SGOT) 19 20   ALT (SGPT) 16 14   (A) Abnormal value            CBC w/diff    CBC " w/Diff 9/10/21 3/18/22   WBC 6.15 7.02   RBC 3.98 4.34   Hemoglobin 11.3 (A) 11.4 (A)   Hematocrit 34.5 36.2   MCV 86.7 83.4   MCH 28.4 26.3 (A)   MCHC 32.8 31.5   RDW 12.4 13.2   Platelets 265 274   Neutrophil Rel % 51.5 63.2   Immature Granulocyte Rel % 0.2 0.3   Lymphocyte Rel % 31.1 22.2   Monocyte Rel % 11.7 9.1   Eosinophil Rel % 4.4 4.1   Basophil Rel % 1.1 1.1   (A) Abnormal value             Lipid Panel    Lipid Panel 9/10/21 3/18/22   Total Cholesterol 191 202 (A)   Triglycerides 84 51   HDL Cholesterol 67 (A) 73 (A)   VLDL Cholesterol 15 9   LDL Cholesterol  109 (A) 120 (A)   LDL/HDL Ratio 1.60 1.63   (A) Abnormal value             Lab Results   Component Value Date    TSH 2.060 10/16/2019    TSH 2.550 05/29/2019      Lab Results   Component Value Date    FREET4 1.1 10/16/2019    FREET4 1.1 05/29/2019         Lab Results   Component Value Date    PWKQVVMP41 1,128 (H) 03/18/2022    EPGA69DY 32.0 03/18/2022    MG 2.02 04/12/2021        No Images in the past 120 days found..             ASSESSMENT & PLAN  Diagnoses and all orders for this visit:    1. COVID-19 virus infection (Primary)  Assessment & Plan:  Day 4 of symptoms which started 4 days ago with bad headache and postnasal drainage exposed to her brother the day before.  We will give Medrol dose pack Kevin Parada.  Told to quarantine for total of 5 days so until July 12 then use the mask for the next 5 days.  She needs to monitor for any increased fever shortness of air or chest pain and return to clinic for telehealth or to the ER.    Orders:  -     methylPREDNISolone (MEDROL) 4 MG dose pack; Take 6 tablets by mouth Daily for 1 day, THEN 5 tablets Daily for 1 day, THEN 4 tablets Daily for 1 day, THEN 3 tablets Daily for 1 day, THEN 2 tablets Daily for 1 day, THEN 1 tablet Daily for 1 day. Take as directed on package instructions.  Dispense: 21 tablet; Refill: 0  -     benzonatate (Tessalon Perles) 100 MG capsule; Take 1 capsule by mouth 3 (Three)  Times a Day As Needed for Cough.  Dispense: 30 capsule; Refill: 0    2. Acute vaginitis  Comments:  diflucan x 1 one week apart-tooke otc yeast med and with extreme itching at area and vag d/c  Orders:  -     fluconazole (Diflucan) 150 MG tablet; One  tab po now and repeat in 1 week if not better  Dispense: 2 tablet; Refill: 0        FOLLOW UP  No follow-ups on file.    Patient was given instructions and counseling regarding her condition or for health maintenance advice. Please see specific information pulled into the AVS if appropriate.

## 2022-07-11 NOTE — ASSESSMENT & PLAN NOTE
Day 4 of symptoms which started 4 days ago with bad headache and postnasal drainage exposed to her brother the day before.  We will give Medrol dose pack Kevin Parada.  Told to quarantine for total of 5 days so until July 12 then use the mask for the next 5 days.  She needs to monitor for any increased fever shortness of air or chest pain and return to clinic for telehealth or to the ER.

## 2022-07-13 NOTE — TELEPHONE ENCOUNTER
Caller: Erik Bhatia    Relationship: Self    Best call back number: [phone]    What is the best time to reach you: ANYTIME    Who are you requesting to speak with (clinical staff, provider,  specific staff member):  CLINICAL      What was the call regarding: THE PATIENT STATED HER VYVANSE IS STILL NOT APPROVED AND SENT TO TO PHARMACY. PATIENT IS COMPLETELY OUT OF MEDICATION    Do you require a callback: YES

## 2022-07-15 ENCOUNTER — TELEPHONE (OUTPATIENT)
Dept: INTERNAL MEDICINE | Facility: CLINIC | Age: 77
End: 2022-07-15

## 2022-07-15 RX ORDER — LISDEXAMFETAMINE DIMESYLATE 50 MG
50 CAPSULE ORAL EVERY MORNING
Qty: 30 CAPSULE | Refills: 0 | Status: SHIPPED | OUTPATIENT
Start: 2022-07-15 | End: 2022-08-10 | Stop reason: SDUPTHER

## 2022-07-15 NOTE — TELEPHONE ENCOUNTER
Dr. Leonard pt is on Vvyanse and Sheyla can not prescribe this, she is wanting to know if you will take over this patient's care?

## 2022-07-15 NOTE — TELEPHONE ENCOUNTER
As noted, let her know prescription sent, and I will be happy to see her, looks like she has an appointment scheduled early August.

## 2022-07-15 NOTE — TELEPHONE ENCOUNTER
This patient is request vyvanse refill. She will be a PRECIOUS patient to Sheyla from Dr. Borges however Sheyla can't even refill this medication. She usually sends this to psych or DR. Borges signs for her. Dr. Shafer is not signing this medications without appt first and she is referring these out. How do we go about this?

## 2022-07-15 NOTE — TELEPHONE ENCOUNTER
Caller: Erik Bhatia    Relationship: Self    Best call back number: [phone]    [phone]        What is the best time to reach you: ANYTIME    Who are you requesting to speak with (clinical staff, provider,  specific staff member): CLINICAL       What was the call regarding:       THE PATIENT IS CALLING FOR AN UPDATE ON THIS MEDICATION. SHE SAID SHE HAS BEEN OUT OF THIS MEDICATION FOR 3 DAYS. SHE SAID SHE IS NOT HAPPY THAT THIS IS TAKING SO LONG TO GET THIS SITUATION TAKEN CARE OF       PATIENT WOULD LIKE A CALL BACK TO DISCUSS ASAP     Do you require a callback: YES

## 2022-07-21 DIAGNOSIS — U07.1 COVID-19 VIRUS INFECTION: ICD-10-CM

## 2022-07-21 NOTE — TELEPHONE ENCOUNTER
Caller: Erik Bhatia    Relationship: Self    Best call back number: [phone]    Requested Prescriptions:   Requested Prescriptions     Pending Prescriptions Disp Refills   • benzonatate (Tessalon Perles) 100 MG capsule 30 capsule 0     Sig: Take 1 capsule by mouth 3 (Three) Times a Day As Needed for Cough.        Pharmacy where request should be sent: [facility] [address] [place] [address], [place] - [address] AT [facility] [address] - [phone] [facility] [phone] FX     Additional details provided by patient: PATIENT STATES SHE TESTED POSITIVE FOR COVID AGAIN ON Monday AND IS STILL EXPERIENCING FATIGUE AND COUGH. PATIENT STATES THE TESSALON PERLES ARE HELPING KEEP THE COUGH AT BAY. PATIENT STATES SHE WOULD LIKE A MEDICATION THAT WOULD HELP WITH THE FATIGUE IF POSSIBLE. PLEASE CALL PATIENT AND ADVISE WHAT WAS SENT.     Does the patient have less than a 3 day supply:  [x] Yes  [] No    Rosanna CABRERA Rep   07/21/22 14:58 EDT

## 2022-07-22 RX ORDER — BENZONATATE 100 MG/1
100 CAPSULE ORAL 3 TIMES DAILY PRN
Qty: 30 CAPSULE | Refills: 0 | Status: SHIPPED | OUTPATIENT
Start: 2022-07-22 | End: 2022-11-22 | Stop reason: SDUPTHER

## 2022-08-08 ENCOUNTER — LAB (OUTPATIENT)
Dept: LAB | Facility: HOSPITAL | Age: 77
End: 2022-08-08

## 2022-08-08 DIAGNOSIS — R63.4 WEIGHT LOSS: ICD-10-CM

## 2022-08-08 DIAGNOSIS — E87.6 HYPOKALEMIA: ICD-10-CM

## 2022-08-08 DIAGNOSIS — R53.83 FATIGUE, UNSPECIFIED TYPE: ICD-10-CM

## 2022-08-08 DIAGNOSIS — M19.90 ARTHRITIS: ICD-10-CM

## 2022-08-08 DIAGNOSIS — D50.8 IRON DEFICIENCY ANEMIA DUE TO DIETARY CAUSES: ICD-10-CM

## 2022-08-08 DIAGNOSIS — Z11.59 NEED FOR HEPATITIS C SCREENING TEST: ICD-10-CM

## 2022-08-08 DIAGNOSIS — E83.42 HYPOMAGNESEMIA: ICD-10-CM

## 2022-08-08 DIAGNOSIS — K21.9 GASTROESOPHAGEAL REFLUX DISEASE WITHOUT ESOPHAGITIS: ICD-10-CM

## 2022-08-08 DIAGNOSIS — E55.9 VITAMIN D DEFICIENCY: ICD-10-CM

## 2022-08-08 DIAGNOSIS — R74.8 ELEVATED VITAMIN B12 LEVEL: ICD-10-CM

## 2022-08-08 DIAGNOSIS — E78.5 HYPERLIPIDEMIA, UNSPECIFIED HYPERLIPIDEMIA TYPE: ICD-10-CM

## 2022-08-08 LAB
ALBUMIN SERPL-MCNC: 4.4 G/DL (ref 3.5–5.2)
ALBUMIN/GLOB SERPL: 1.5 G/DL
ALP SERPL-CCNC: 105 U/L (ref 39–117)
ALT SERPL W P-5'-P-CCNC: 18 U/L (ref 1–33)
ANION GAP SERPL CALCULATED.3IONS-SCNC: 11 MMOL/L (ref 5–15)
AST SERPL-CCNC: 23 U/L (ref 1–32)
BASOPHILS # BLD AUTO: 0.06 10*3/MM3 (ref 0–0.2)
BASOPHILS NFR BLD AUTO: 1.1 % (ref 0–1.5)
BILIRUB SERPL-MCNC: 0.4 MG/DL (ref 0–1.2)
BUN SERPL-MCNC: 11 MG/DL (ref 8–23)
BUN/CREAT SERPL: 17.5 (ref 7–25)
CALCIUM SPEC-SCNC: 9.9 MG/DL (ref 8.6–10.5)
CHLORIDE SERPL-SCNC: 103 MMOL/L (ref 98–107)
CHOLEST SERPL-MCNC: 204 MG/DL (ref 0–200)
CO2 SERPL-SCNC: 26 MMOL/L (ref 22–29)
CREAT SERPL-MCNC: 0.63 MG/DL (ref 0.57–1)
DEPRECATED RDW RBC AUTO: 40.8 FL (ref 37–54)
EGFRCR SERPLBLD CKD-EPI 2021: 91.5 ML/MIN/1.73
EOSINOPHIL # BLD AUTO: 0.42 10*3/MM3 (ref 0–0.4)
EOSINOPHIL NFR BLD AUTO: 7.4 % (ref 0.3–6.2)
ERYTHROCYTE [DISTWIDTH] IN BLOOD BY AUTOMATED COUNT: 12 % (ref 12.3–15.4)
GLOBULIN UR ELPH-MCNC: 3 GM/DL
GLUCOSE SERPL-MCNC: 101 MG/DL (ref 65–99)
HCT VFR BLD AUTO: 39.6 % (ref 34–46.6)
HCV AB SER DONR QL: NORMAL
HDLC SERPL-MCNC: 72 MG/DL (ref 40–60)
HGB BLD-MCNC: 12.9 G/DL (ref 12–15.9)
IMM GRANULOCYTES # BLD AUTO: 0.04 10*3/MM3 (ref 0–0.05)
IMM GRANULOCYTES NFR BLD AUTO: 0.7 % (ref 0–0.5)
IRON 24H UR-MRATE: 78 MCG/DL (ref 37–145)
IRON SATN MFR SERPL: 22 % (ref 20–50)
LDLC SERPL CALC-MCNC: 114 MG/DL (ref 0–100)
LDLC/HDLC SERPL: 1.54 {RATIO}
LYMPHOCYTES # BLD AUTO: 1.78 10*3/MM3 (ref 0.7–3.1)
LYMPHOCYTES NFR BLD AUTO: 31.4 % (ref 19.6–45.3)
MAGNESIUM SERPL-MCNC: 2.1 MG/DL (ref 1.6–2.4)
MCH RBC QN AUTO: 29.9 PG (ref 26.6–33)
MCHC RBC AUTO-ENTMCNC: 32.6 G/DL (ref 31.5–35.7)
MCV RBC AUTO: 91.7 FL (ref 79–97)
MONOCYTES # BLD AUTO: 0.6 10*3/MM3 (ref 0.1–0.9)
MONOCYTES NFR BLD AUTO: 10.6 % (ref 5–12)
NEUTROPHILS NFR BLD AUTO: 2.76 10*3/MM3 (ref 1.7–7)
NEUTROPHILS NFR BLD AUTO: 48.8 % (ref 42.7–76)
NRBC BLD AUTO-RTO: 0 /100 WBC (ref 0–0.2)
PLATELET # BLD AUTO: 286 10*3/MM3 (ref 140–450)
PMV BLD AUTO: 10.4 FL (ref 6–12)
POTASSIUM SERPL-SCNC: 3.9 MMOL/L (ref 3.5–5.2)
PROT SERPL-MCNC: 7.4 G/DL (ref 6–8.5)
RBC # BLD AUTO: 4.32 10*6/MM3 (ref 3.77–5.28)
SODIUM SERPL-SCNC: 140 MMOL/L (ref 136–145)
T4 FREE SERPL-MCNC: 1 NG/DL (ref 0.93–1.7)
TIBC SERPL-MCNC: 359 MCG/DL (ref 298–536)
TRANSFERRIN SERPL-MCNC: 241 MG/DL (ref 200–360)
TRIGL SERPL-MCNC: 105 MG/DL (ref 0–150)
TSH SERPL DL<=0.05 MIU/L-ACNC: 1.85 UIU/ML (ref 0.27–4.2)
VIT B12 BLD-MCNC: 1179 PG/ML (ref 211–946)
VLDLC SERPL-MCNC: 18 MG/DL (ref 5–40)
WBC NRBC COR # BLD: 5.66 10*3/MM3 (ref 3.4–10.8)

## 2022-08-08 PROCEDURE — 82607 VITAMIN B-12: CPT

## 2022-08-08 PROCEDURE — 82746 ASSAY OF FOLIC ACID SERUM: CPT

## 2022-08-08 PROCEDURE — 82306 VITAMIN D 25 HYDROXY: CPT

## 2022-08-08 PROCEDURE — 84443 ASSAY THYROID STIM HORMONE: CPT

## 2022-08-08 PROCEDURE — 36415 COLL VENOUS BLD VENIPUNCTURE: CPT

## 2022-08-08 PROCEDURE — 80053 COMPREHEN METABOLIC PANEL: CPT

## 2022-08-08 PROCEDURE — 83735 ASSAY OF MAGNESIUM: CPT

## 2022-08-08 PROCEDURE — 86803 HEPATITIS C AB TEST: CPT

## 2022-08-08 PROCEDURE — 83540 ASSAY OF IRON: CPT

## 2022-08-08 PROCEDURE — 84439 ASSAY OF FREE THYROXINE: CPT

## 2022-08-08 PROCEDURE — 80061 LIPID PANEL: CPT

## 2022-08-08 PROCEDURE — 84466 ASSAY OF TRANSFERRIN: CPT

## 2022-08-08 PROCEDURE — 85025 COMPLETE CBC W/AUTO DIFF WBC: CPT

## 2022-08-09 LAB
25(OH)D3 SERPL-MCNC: 27.9 NG/ML (ref 30–100)
FOLATE SERPL-MCNC: 11.2 NG/ML (ref 4.78–24.2)

## 2022-08-10 ENCOUNTER — OFFICE VISIT (OUTPATIENT)
Dept: INTERNAL MEDICINE | Facility: CLINIC | Age: 77
End: 2022-08-10

## 2022-08-10 VITALS
BODY MASS INDEX: 32.79 KG/M2 | TEMPERATURE: 97.2 F | SYSTOLIC BLOOD PRESSURE: 138 MMHG | HEIGHT: 60 IN | HEART RATE: 91 BPM | WEIGHT: 167 LBS | DIASTOLIC BLOOD PRESSURE: 83 MMHG | OXYGEN SATURATION: 98 %

## 2022-08-10 DIAGNOSIS — N76.0 ACUTE VAGINITIS: ICD-10-CM

## 2022-08-10 DIAGNOSIS — E55.9 VITAMIN D DEFICIENCY: ICD-10-CM

## 2022-08-10 DIAGNOSIS — J31.0 CHRONIC RHINITIS: ICD-10-CM

## 2022-08-10 DIAGNOSIS — K21.9 GASTROESOPHAGEAL REFLUX DISEASE WITHOUT ESOPHAGITIS: ICD-10-CM

## 2022-08-10 DIAGNOSIS — D50.8 IRON DEFICIENCY ANEMIA DUE TO DIETARY CAUSES: ICD-10-CM

## 2022-08-10 DIAGNOSIS — J45.40 MODERATE PERSISTENT ASTHMA WITHOUT COMPLICATION: ICD-10-CM

## 2022-08-10 DIAGNOSIS — F98.8 ATTENTION DEFICIT DISORDER (ADD) WITHOUT HYPERACTIVITY: Primary | ICD-10-CM

## 2022-08-10 DIAGNOSIS — E78.2 MIXED HYPERLIPIDEMIA: ICD-10-CM

## 2022-08-10 DIAGNOSIS — I35.8 AORTIC HEART MURMUR: ICD-10-CM

## 2022-08-10 PROBLEM — M21.619 BUNION: Status: RESOLVED | Noted: 2021-07-28 | Resolved: 2022-08-10

## 2022-08-10 PROBLEM — L84 CORNS AND CALLOSITY: Status: RESOLVED | Noted: 2021-07-28 | Resolved: 2022-08-10

## 2022-08-10 PROBLEM — L60.0 INGROWN TOENAIL: Status: RESOLVED | Noted: 2021-07-28 | Resolved: 2022-08-10

## 2022-08-10 PROCEDURE — 99215 OFFICE O/P EST HI 40 MIN: CPT | Performed by: INTERNAL MEDICINE

## 2022-08-10 RX ORDER — LISDEXAMFETAMINE DIMESYLATE 50 MG
50 CAPSULE ORAL EVERY MORNING
Qty: 30 CAPSULE | Refills: 0 | Status: SHIPPED | OUTPATIENT
Start: 2022-08-10 | End: 2022-09-12 | Stop reason: SDUPTHER

## 2022-08-10 RX ORDER — FLUCONAZOLE 150 MG/1
TABLET ORAL
Qty: 2 TABLET | Refills: 0 | Status: SHIPPED | OUTPATIENT
Start: 2022-08-10 | End: 2022-12-05

## 2022-08-10 RX ORDER — CEPHALEXIN 500 MG/1
500 CAPSULE ORAL 3 TIMES DAILY
Qty: 15 CAPSULE | Refills: 1 | Status: SHIPPED | OUTPATIENT
Start: 2022-08-10 | End: 2022-11-14

## 2022-08-10 RX ORDER — FAMOTIDINE 20 MG/1
20 TABLET, FILM COATED ORAL 2 TIMES DAILY
Qty: 180 TABLET | Refills: 1 | Status: SHIPPED | OUTPATIENT
Start: 2022-08-10 | End: 2022-12-05

## 2022-08-10 NOTE — ASSESSMENT & PLAN NOTE
Patient well controlled with aggressive regimen to include Singulair, Flonase, Zyrtec-D, as well as eyedrops.  Continue same.

## 2022-08-10 NOTE — ASSESSMENT & PLAN NOTE
I reviewed Dr. Borges's records in detail earlier today.  As noted, patient's been maintained on 50 mg of Vyvanse for many years now.  Despite her advancing age, she has not had any side effects from the medications, no palpitations, certainly no significant hypertensive response.  Patient understands associated risks of the medication and wishes to continue current treatment plan.

## 2022-08-10 NOTE — PROGRESS NOTES
"Chief Complaint  Heartburn, Follow-up (Pt is a transfer from [facility] due to being on Vyvanse. //She is concerned with her having allergies that it may be COVID and she doesn't know, she didn't know at what point she should be concerned with testing.), and Urinary Tract Infection (Pt feels that she is getting a Uti, she states that she gets them frequently. She has read a lot about uqora to help prevention these, do you feel that would be good for her?)    Symone      Erik Bhatia presents to [facility]    History of Present Illness  76 yo female, former pt of Dr Posey, coming in as New Pt to me 8/22.  She has underlying ADD, RAD, GERD, RLS, among others.  We will go over her med list, review any recent labs, and make further recommendations at that time.    Review of Systems   Constitutional: Negative for appetite change, fatigue and fever.   HENT: Negative for congestion and ear pain.    Eyes: Negative for blurred vision.   Respiratory: Negative for cough, chest tightness, shortness of breath and wheezing.    Cardiovascular: Negative for chest pain, palpitations and leg swelling.   Gastrointestinal: Negative for abdominal pain.   Genitourinary: Negative for difficulty urinating, dysuria and hematuria.   Musculoskeletal: Negative for arthralgias and gait problem.   Skin: Negative for skin lesions.   Neurological: Negative for syncope, memory problem and confusion.   Psychiatric/Behavioral: Negative for self-injury and depressed mood.       Objective   Vital Signs:   /83 (BP Location: Left arm, Patient Position: Sitting, Cuff Size: Large Adult)   Pulse 91   Temp 97.2 °F (36.2 °C)   Ht 152.4 cm (60\")   Wt 75.8 kg (167 lb)   SpO2 98%   BMI 32.61 kg/m²           Physical Exam  Vitals and nursing note reviewed.   Constitutional:       General: She is not in acute distress.     Appearance: Normal appearance. She is not toxic-appearing.   HENT:      Head: Atraumatic.    "   Right Ear: External ear normal.      Left Ear: External ear normal.      Nose: Nose normal.      Mouth/Throat:      Mouth: Mucous membranes are moist.   Eyes:      General:         Right eye: No discharge.         Left eye: No discharge.      Extraocular Movements: Extraocular movements intact.      Pupils: Pupils are equal, round, and reactive to light.   Cardiovascular:      Rate and Rhythm: Normal rate and regular rhythm.      Pulses: Normal pulses.      Heart sounds: Normal heart sounds. No murmur heard.    No gallop.   Pulmonary:      Effort: Pulmonary effort is normal. No respiratory distress.      Breath sounds: No wheezing, rhonchi or rales.   Abdominal:      General: There is no distension.      Palpations: Abdomen is soft. There is no mass.      Tenderness: There is no abdominal tenderness. There is no guarding.   Musculoskeletal:         General: No swelling or tenderness.      Cervical back: No tenderness.      Right lower leg: No edema.      Left lower leg: No edema.   Skin:     General: Skin is warm and dry.      Findings: No rash.   Neurological:      General: No focal deficit present.      Mental Status: She is alert and oriented to person, place, and time. Mental status is at baseline.      Motor: No weakness.      Gait: Gait normal.   Psychiatric:         Mood and Affect: Mood normal.         Thought Content: Thought content normal.          Result Review   The following data was reviewed by: Marco Antonio Leonard MD on 08/10/2022:  [x] Laboratory  [] Microbiology  [] Radiology  [] EKG/telemetry  [] Cardiology/Vascular  [] Pathology  [x] Old records             Assessment and Plan   Diagnoses and all orders for this visit:    1. Attention deficit disorder (ADD) without hyperactivity (Primary)  Assessment & Plan:  I reviewed Dr. Borges's records in detail earlier today.  As noted, patient's been maintained on 50 mg of Vyvanse for many years now.  Despite her advancing age, she has not had any side effects  from the medications, no palpitations, certainly no significant hypertensive response.  Patient understands associated risks of the medication and wishes to continue current treatment plan.    Orders:  -     Vyvanse 50 MG capsule; Take 1 capsule by mouth Every Morning  Dispense: 30 capsule; Refill: 0    2. Chronic rhinitis  Assessment & Plan:  Patient well controlled with aggressive regimen to include Singulair, Flonase, Zyrtec-D, as well as eyedrops.  Continue same.      3. Gastroesophageal reflux disease without esophagitis  Assessment & Plan:  Patient no dysphagia, no significant dyspepsia on chronic PPI as of her 8/22 new patient appointment.  I reviewed with her trying to gradually transition from Prilosec to Pepcid, she will try alternating day to day initially and then stretching it out if tolerates.    Orders:  -     Comprehensive Metabolic Panel; Future    4. Iron deficiency anemia due to dietary causes  Assessment & Plan:  Hemoglobin is up to 12.9, her iron studies have improved as well as of 8/22.  This was after the patient received several rounds of IV iron.  I recommend she go ahead and get started on oral supplementation again, low-dose over-the-counter is fine.  We will repeat levels in about 4 months or so.    Orders:  -     CBC & Differential; Future  -     Iron Profile; Future  -     Ferritin; Future    5. Vitamin D deficiency  Assessment & Plan:  Patient has not been on the prescription dose for a while as of 8/22.  She is on 2000 units over-the-counter, her level is only 27, I recommend she increase to 4000 units now.    Orders:  -     Vitamin D 25 Hydroxy; Future    6. Moderate persistent asthma without complication  Assessment & Plan:  Patient remained stable as of her 8/22 office visit.  She is on twice daily Symbicort as a controller, she is aware to rinse her mouth out.  She has not had use much of the rescue inhaler, but she does have it available and knows how to utilize it.  Recommend  continue with Symbicort as well has her oral agents, Singulair and Zyrtec-D.      7. Acute vaginitis  Comments:  diflucan x 1 one week apart-tooke otc yeast med and with extreme itching at area and vag d/c  Orders:  -     fluconazole (Diflucan) 150 MG tablet; One  tab po now and repeat in 1 week if not better  Dispense: 2 tablet; Refill: 0    8. Mixed hyperlipidemia  Assessment & Plan:  LDL is down slightly to 114 as of 8/22.  Patient has not really made significant changes in her diet yet, she will look at some of the fats that she could perhaps eliminate.  She is not anxious to be on a statin, she otherwise appears to be low risk for CAD/CVA, so I agree with deferring treatment for now.    Orders:  -     Comprehensive Metabolic Panel; Future    9. Aortic heart murmur  Assessment & Plan:  Patient with 3/6 systolic murmur right upper sternal border.  She does not report having any kind of recent echocardiogram etc., we will go ahead and schedule that at this time.    Orders:  -     Adult Transthoracic Echo Complete W/ Cont if Necessary Per Protocol; Future    Other orders  -     famotidine (Pepcid) 20 MG tablet; Take 1 tablet by mouth 2 (Two) Times a Day.  Dispense: 180 tablet; Refill: 1  -     cephalexin (Keflex) 500 MG capsule; Take 1 capsule by mouth 3 (Three) Times a Day.  Dispense: 15 capsule; Refill: 1        Follow Up   Return in about 4 months (around 12/10/2022).   Total time spent 48 minutes.  This included an actual 36 minutes spent with the patient in the exam room reviewing her med list, addressing her new concerns, reviewing her comprehensive labs that were obtained prior to the appointment.  Additionally on the day of the visit, the patient's extensive past medical history was reviewed, specifically office notes from her primary physician from March 2022 as well as September 2021.  Additionally recent urgent care visits from other providers were reviewed.    Patient was given instructions and  counseling regarding her condition or for health maintenance advice. Please see specific information pulled into the AVS if appropriate.

## 2022-08-10 NOTE — ASSESSMENT & PLAN NOTE
Patient with 3/6 systolic murmur right upper sternal border.  She does not report having any kind of recent echocardiogram etc., we will go ahead and schedule that at this time.

## 2022-08-10 NOTE — ASSESSMENT & PLAN NOTE
Patient has not been on the prescription dose for a while as of 8/22.  She is on 2000 units over-the-counter, her level is only 27, I recommend she increase to 4000 units now.

## 2022-08-10 NOTE — ASSESSMENT & PLAN NOTE
Hemoglobin is up to 12.9, her iron studies have improved as well as of 8/22.  This was after the patient received several rounds of IV iron.  I recommend she go ahead and get started on oral supplementation again, low-dose over-the-counter is fine.  We will repeat levels in about 4 months or so.

## 2022-08-10 NOTE — ASSESSMENT & PLAN NOTE
Patient remained stable as of her 8/22 office visit.  She is on twice daily Symbicort as a controller, she is aware to rinse her mouth out.  She has not had use much of the rescue inhaler, but she does have it available and knows how to utilize it.  Recommend continue with Symbicort as well has her oral agents, Singulair and Zyrtec-D.

## 2022-08-10 NOTE — ASSESSMENT & PLAN NOTE
LDL is down slightly to 114 as of 8/22.  Patient has not really made significant changes in her diet yet, she will look at some of the fats that she could perhaps eliminate.  She is not anxious to be on a statin, she otherwise appears to be low risk for CAD/CVA, so I agree with deferring treatment for now.

## 2022-08-10 NOTE — ASSESSMENT & PLAN NOTE
Patient no dysphagia, no significant dyspepsia on chronic PPI as of her 8/22 new patient appointment.  I reviewed with her trying to gradually transition from Prilosec to Pepcid, she will try alternating day to day initially and then stretching it out if tolerates.

## 2022-08-17 ENCOUNTER — OFFICE VISIT (OUTPATIENT)
Dept: PODIATRY | Facility: CLINIC | Age: 77
End: 2022-08-17

## 2022-08-17 VITALS
DIASTOLIC BLOOD PRESSURE: 62 MMHG | HEART RATE: 118 BPM | SYSTOLIC BLOOD PRESSURE: 132 MMHG | TEMPERATURE: 97.5 F | WEIGHT: 165 LBS | HEIGHT: 60 IN | OXYGEN SATURATION: 96 % | BODY MASS INDEX: 32.39 KG/M2

## 2022-08-17 DIAGNOSIS — M79.672 FOOT PAIN, BILATERAL: ICD-10-CM

## 2022-08-17 DIAGNOSIS — L60.0 ONYCHOCRYPTOSIS: ICD-10-CM

## 2022-08-17 DIAGNOSIS — L89.891 PRESSURE INJURY OF TOE OF RIGHT FOOT, STAGE 1: ICD-10-CM

## 2022-08-17 DIAGNOSIS — M20.11 VALGUS DEFORMITY OF BOTH GREAT TOES: Primary | ICD-10-CM

## 2022-08-17 DIAGNOSIS — M20.41 HAMMER TOES OF BOTH FEET: ICD-10-CM

## 2022-08-17 DIAGNOSIS — L89.891 PRESSURE INJURY OF RIGHT FOOT, STAGE 1: ICD-10-CM

## 2022-08-17 DIAGNOSIS — M20.12 VALGUS DEFORMITY OF BOTH GREAT TOES: Primary | ICD-10-CM

## 2022-08-17 DIAGNOSIS — M79.671 FOOT PAIN, RIGHT: ICD-10-CM

## 2022-08-17 DIAGNOSIS — M20.42 HAMMER TOES OF BOTH FEET: ICD-10-CM

## 2022-08-17 DIAGNOSIS — M79.671 FOOT PAIN, BILATERAL: ICD-10-CM

## 2022-08-17 DIAGNOSIS — B35.1 ONYCHOMYCOSIS: ICD-10-CM

## 2022-08-17 PROCEDURE — 11721 DEBRIDE NAIL 6 OR MORE: CPT | Performed by: PODIATRIST

## 2022-08-17 PROCEDURE — 97597 DBRDMT OPN WND 1ST 20 CM/<: CPT | Performed by: PODIATRIST

## 2022-08-17 NOTE — PROGRESS NOTES
[facility] - PODIATRY    Today's Date: 08/17/22    Patient Name: Erik Bhatia  MRN: [id number]  CSN: [id number]  PCP: Marco Antonio Leonard MD, last PCP visit: 11 March 2022  Referring Provider: No ref. provider found    SUBJECTIVE     Chief Complaint   Patient presents with   • Left Foot - Nail Problem   • Right Foot - Nail Problem     HPI: Erik Bhatia, a 77 y.o.female, comes presents to clinic with chief complaint of:    New, Established, New Problem: Established    Location:  hyperkeratotic lesion(s) on dorsal right second proximal interphalangeal joint    Duration: 2015    Onset:  gradual    Nature:  sore    Stable, worsening, improving: Stable, recurring    Aggravating factors:  Patient reports lesion(s) is painful with shoegear and ambulation.      Previous Treatment:   Debridement  ---------------------------  New, Established, New Problem: est    Location: Toenails    Duration:  Greater than five years    Onset: Gradual    Nature: sore with palpation.    Stable, worsening, improving: Worsening    Aggravating factors: Pain with shoe gear and ambulation.    Previous Treatment:  Debridement    Patient relates no medical changes since their last visit.    Patient denies any fevers, chills, nausea, vomiting, shortness of breathe, nor any other constitutional signs nor symptoms.    Past Medical History:   Diagnosis Date   • Arthritis    • Asthma    • Bladder disorder    • Bunion    • Chronic allergic rhinitis    • Corns and callus    • GERD (gastroesophageal reflux disease)    • Hammertoe    • Ingrown toenail    • Limb pain    • Limb swelling    • Numbness in feet    • SOB (shortness of breath)      Past Surgical History:   Procedure Laterality Date   • BREAST BIOPSY     • CARPAL TUNNEL RELEASE     • TRIGGER FINGER RELEASE       Family History   Problem Relation Age of Onset   • Stomach cancer Father    • Diabetes Father    • Diabetes Maternal Grandfather      Social History     Socioeconomic  History   • Marital status:    Tobacco Use   • Smoking status: Never Smoker   • Smokeless tobacco: Never Used   Vaping Use   • Vaping Use: Never used   Substance and Sexual Activity   • Alcohol use: Never   • Drug use: Never   • Sexual activity: Not Currently     Partners: Male     Allergies   Allergen Reactions   • Seasonal Ic [Kelp-B6-Lecithin-Vinegar] Cough   • Nitrofurantoin Provider Review Needed     Current Outpatient Medications   Medication Sig Dispense Refill   • aluminum hydroxide-magnesium carbonate (Gaviscon)  MG/15ML suspension oral suspension 1 mL.     • benzonatate (Tessalon Perles) 100 MG capsule Take 1 capsule by mouth 3 (Three) Times a Day As Needed for Cough. 30 capsule 0   • budesonide-formoterol (SYMBICORT) 160-4.5 MCG/ACT inhaler Inhale 2 puffs 2 (Two) Times a Day. 10.2 g 1   • cephalexin (Keflex) 500 MG capsule Take 1 capsule by mouth 3 (Three) Times a Day. 15 capsule 1   • cetirizine-pseudoephedrine (ZyrTEC-D Allergy & Congestion) 5-120 MG per 12 hr tablet Take 1 tablet by mouth Daily. (Patient taking differently: Take 1 tablet by mouth 2 (Two) Times a Day.) 90 tablet 3   • Cholecalciferol 125 MCG (5000 UT) tablet 1 tablet Daily.     • Diclofenac Sodium (VOLTAREN) 1 % gel gel Apply 4 g topically to the appropriate area as directed 4 (Four) Times a Day As Needed (right shoulder pain). 150 g 1   • estradiol (ESTRACE) 0.1 MG/GM vaginal cream Insert 1 g into the vagina 2 (Two) Times a Week. 42.5 g 3   • famotidine (Pepcid) 20 MG tablet Take 1 tablet by mouth 2 (Two) Times a Day. 180 tablet 1   • fluconazole (Diflucan) 150 MG tablet One  tab po now and repeat in 1 week if not better 2 tablet 0   • fluticasone (Flonase) 50 MCG/ACT nasal spray 2 sprays into the nostril(s) as directed by provider Daily. 45 mL 3   • montelukast (SINGULAIR) 10 MG tablet TAKE 1 TABLET DAILY 90 tablet 3   • olopatadine (PATADAY) 0.2 % solution ophthalmic solution 1 drop.     • omeprazole (priLOSEC) 20 MG  capsule TAKE 1 CAPSULE DAILY 90 capsule 3   • potassium chloride (MICRO-K) 10 MEQ CR capsule TAKE 2 CAPSULES DAILY. 180 capsule 3   • triamcinolone (KENALOG) 0.1 % ointment      • Vyvanse 50 MG capsule Take 1 capsule by mouth Every Morning 30 capsule 0     Current Facility-Administered Medications   Medication Dose Route Frequency Provider Last Rate Last Admin   • ferric carboxymaltose (INJECTAFER) injection solution 750 mg  750 mg Intravenous Once Alda Borges MD         Review of Systems   Constitutional: Negative.    Skin:        Painful hyperkeratotic lesion and toenails   All other systems reviewed and are negative.      OBJECTIVE     Vitals:    08/17/22 1102   BP: 132/62   Pulse: 118   Temp: 97.5 °F (36.4 °C)   SpO2: 96%       Body mass index is 32.22 kg/m².    Lab Results   Component Value Date    GLUCOSE 101 (H) 08/08/2022    CALCIUM 9.9 08/08/2022     08/08/2022    K 3.9 08/08/2022    CO2 26.0 08/08/2022     08/08/2022    BUN 11 08/08/2022    CREATININE 0.63 08/08/2022    EGFRIFNONA 79 09/10/2021    BCR 17.5 08/08/2022    ANIONGAP 11.0 08/08/2022       Patient seen in no apparent distress.      PHYSICAL EXAM:     Foot/Ankle Exam:       General:   Appearance: elderly    Orientation: AAOx3    Affect: appropriate    Gait: unimpaired    Shoe Gear:  Casual shoes    VASCULAR      Right Foot Vascularity   Normal vascular exam    Dorsalis pedis:  2+  Posterior tibial:  2+  Skin Temperature: warm    Edema Grading:  None  CFT:  < 3 seconds  Pedal Hair Growth:  Present  Varicosities: none       Left Foot Vascularity   Normal vascular exam    Dorsalis pedis:  2+  Posterior tibial:  2+  Skin Temperature: warm    Edema Grading:  None  CFT:  < 3 seconds  Pedal Hair Growth:  Present  Varicosities: none        NEUROLOGIC     Right Foot Neurologic   Normal sensation    Light touch sensation:  Normal  Vibratory sensation:  Normal  Hot/Cold sensation: normal    Protective Sensation using [place]-Ayush  Monofilament:  10     Left Foot Neurologic   Normal sensation    Light touch sensation:  Normal  Vibratory sensation:  Normal  Hot/cold sensation: normal    Protective Sensation using [place]-Ayush Monofilament:  10     MUSCULOSKELETAL      Right Foot Musculoskeletal   Hammertoe:  Second toe, third toe, fourth toe and fifth toe  Hallux valgus: Yes       Left Foot Musculoskeletal   Hammertoe:  Second toe, third toe, fourth toe and fifth toe  Hallux valgus: Yes       MUSCLE STRENGTH     Right Foot Muscle Strength   Foot dorsiflexion:  4+  Foot plantar flexion:  4+  Foot inversion:  4+  Foot eversion:  4+     Left Foot Muscle Strength   Foot dorsiflexion:  4+  Foot plantar flexion:  4+  Foot inversion:  4+  Foot eversion:  4+     RANGE OF MOTION      Right Foot Range of Motion   Foot and ankle ROM within normal limits       Left Foot Range of Motion   Foot and ankle ROM within normal limits       DERMATOLOGIC     Right Foot Dermatologic   Skin: ulcer    Nails: onychomycosis, abnormally thick, subungual debris, dystrophic nails and ingrown toenail    Nails comment:  Toenails 1, 2, 3, 4, and 5     Left Foot Dermatologic   Skin: skin intact    Nails: onychomycosis, abnormally thick, subungual debris, dystrophic nails and ingrown toenail    Nails comment:  Toenails 1, 2, 3, 4, and 5      Right Foot Additional Comments Stage 1 ulceration on the dorsal right second PIP joint area of the foot.  Hyperkeratotic tissue with subepidermal hemosiderin deposition is present.  No surrounding, edema, erythema, lymphangitis, fluctuance, nor signs of infection.  No drainage present.  12 mm x 11 mm x 3 mm.  This area was debrided via excisional partial thickness debridement with a 15 scalpel blade.  Post debridement measurements were 10 mm x 8 mm x 1 mm in depth.        ASSESSMENT/PLAN     Diagnoses and all orders for this visit:    1. 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 Onychomycosis    3. Pressure injury of toe of  right foot, stage 1    4. Foot pain, bilateral    5. Hammer toes of both feet    6. Foot pain, right    7. Onychocryptosis    8. Pressure injury of right foot, stage 1        Comprehensive lower extremity examination and evaluation was performed.    Discussed findings and treatment plan including risks, benefits, and treatment options with patient in detail. Patient agreed with treatment plan.    Toenails 1 through 5 bilaterally were debrided in thickness and length with toenail tremors.  These were then smoothed with a dermal sanding bur.  Patient tolerated the procedure well.    An After Visit Summary was printed and given to the patient at discharge, including (if requested) any available informative/educational handouts regarding diagnosis, treatment, or medications. All questions were answered to patient/family satisfaction. Should symptoms fail to improve or worsen they agree to call or return to clinic or to go to the Emergency Department. Discussed the importance of following up with any needed screening tests/labs/specialist appointments and any requested follow-up recommended by me today. Importance of maintaining follow-up discussed and patient accepts that missed appointments can delay diagnosis and potentially lead to worsening of conditions.    Return in about 9 weeks (around 10/19/2022) for Toenail Care, Ulcer Follow-Up., or sooner if acute issues arise.    This document has been electronically signed by Rian Jo DPM on August 17, 2022 11:14 EDT

## 2022-09-12 DIAGNOSIS — F98.8 ATTENTION DEFICIT DISORDER (ADD) WITHOUT HYPERACTIVITY: ICD-10-CM

## 2022-09-12 RX ORDER — LISDEXAMFETAMINE DIMESYLATE 50 MG
50 CAPSULE ORAL EVERY MORNING
Qty: 30 CAPSULE | Refills: 0 | Status: SHIPPED | OUTPATIENT
Start: 2022-09-12 | End: 2022-10-17 | Stop reason: SDUPTHER

## 2022-09-22 ENCOUNTER — HOSPITAL ENCOUNTER (OUTPATIENT)
Dept: CARDIOLOGY | Facility: HOSPITAL | Age: 77
Discharge: HOME | End: 2022-09-22
Admitting: INTERNAL MEDICINE

## 2022-09-22 DIAGNOSIS — I35.8 AORTIC HEART MURMUR: ICD-10-CM

## 2022-09-22 PROCEDURE — 93306 TTE W/DOPPLER COMPLETE: CPT

## 2022-09-22 PROCEDURE — 93306 TTE W/DOPPLER COMPLETE: CPT | Performed by: INTERNAL MEDICINE

## 2022-09-23 LAB
AORTIC DIMENSIONLESS INDEX: 0.7 (DI)
BH CV ECHO MEAS - AO MAX PG: 8 MMHG
BH CV ECHO MEAS - AO MEAN PG: 4 MMHG
BH CV ECHO MEAS - AO ROOT DIAM: 2.4 CM
BH CV ECHO MEAS - AO V2 MAX: 142 CM/SEC
BH CV ECHO MEAS - AO V2 VTI: 27 CM
BH CV ECHO MEAS - EDV(MOD-SP2): 58.4 ML
BH CV ECHO MEAS - EDV(MOD-SP4): 52.1 ML
BH CV ECHO MEAS - EF(MOD-BP): 65 %
BH CV ECHO MEAS - ESV(MOD-SP2): 20.3 ML
BH CV ECHO MEAS - ESV(MOD-SP4): 18 ML
BH CV ECHO MEAS - IVSD: 0.8 CM
BH CV ECHO MEAS - LA A2CS (ATRIAL LENGTH): 5.3 CM
BH CV ECHO MEAS - LA A4C LENGTH: 5.3 CM
BH CV ECHO MEAS - LA DIMENSION: 3.1 CM
BH CV ECHO MEAS - LAT PEAK E' VEL: 7.2 CM/SEC
BH CV ECHO MEAS - LV MAX PG: 5 MMHG
BH CV ECHO MEAS - LV MEAN PG: 3 MMHG
BH CV ECHO MEAS - LV V1 MAX: 108 CM/SEC
BH CV ECHO MEAS - LV V1 VTI: 18.8 CM
BH CV ECHO MEAS - LVIDD: 3.9 CM
BH CV ECHO MEAS - LVIDS: 2.5 CM
BH CV ECHO MEAS - LVOT DIAM: 2 CM
BH CV ECHO MEAS - LVPWD: 0.8 CM
BH CV ECHO MEAS - MED PEAK E' VEL: 7 CM/SEC
BH CV ECHO MEAS - MV A MAX VEL: 118 CM/SEC
BH CV ECHO MEAS - MV DEC SLOPE: 522 CM/SEC2
BH CV ECHO MEAS - MV DEC TIME: 191 MSEC
BH CV ECHO MEAS - MV E MAX VEL: 99.4 CM/SEC
BH CV ECHO MEAS - MV E/A: 0.8
BH CV ECHO MEAS - MV MAX PG: 7 MMHG
BH CV ECHO MEAS - MV MEAN PG: 4 MMHG
BH CV ECHO MEAS - MV P1/2T: 56 MSEC
BH CV ECHO MEAS - MV V2 VTI: 23.9 CM
BH CV ECHO MEAS - MVA(P1/2T): 3.9 CM2
BH CV ECHO MEAS - PA V2 MAX: 96 CM/SEC
BH CV ECHO MEAS - RVDD: 2.6 CM
BH CV ECHO MEASUREMENTS AVERAGE E/E' RATIO: 14
IVRT: 70 MSEC
LEFT ATRIUM VOLUME INDEX: 22.8 ML/M2
LEFT ATRIUM VOLUME: 39.2 ML
MAXIMAL PREDICTED HEART RATE: 143 BPM
STRESS TARGET HR: 122 BPM

## 2022-10-05 NOTE — ASSESSMENT & PLAN NOTE
Patient remains on Prilosec 20 mg daily for GERD.  Plan: Continue Prilosec daily.  Try decreasing to Pepcid 20 twice daily if tolerated at least part of the time.   Spoke with pt re: annual appt.   Made appt with pt & labs too. Labs ordered & LINKED

## 2022-10-17 DIAGNOSIS — F98.8 ATTENTION DEFICIT DISORDER (ADD) WITHOUT HYPERACTIVITY: ICD-10-CM

## 2022-10-17 RX ORDER — LISDEXAMFETAMINE DIMESYLATE 50 MG
50 CAPSULE ORAL EVERY MORNING
Qty: 30 CAPSULE | Refills: 0 | Status: SHIPPED | OUTPATIENT
Start: 2022-10-17 | End: 2022-11-21 | Stop reason: SDUPTHER

## 2022-11-14 ENCOUNTER — OFFICE VISIT (OUTPATIENT)
Dept: PODIATRY | Facility: CLINIC | Age: 77
End: 2022-11-14

## 2022-11-14 VITALS
WEIGHT: 160 LBS | HEIGHT: 60 IN | HEART RATE: 106 BPM | OXYGEN SATURATION: 98 % | DIASTOLIC BLOOD PRESSURE: 59 MMHG | SYSTOLIC BLOOD PRESSURE: 132 MMHG | BODY MASS INDEX: 31.41 KG/M2 | TEMPERATURE: 97.6 F

## 2022-11-14 DIAGNOSIS — L89.891 PRESSURE INJURY OF TOE OF RIGHT FOOT, STAGE 1: ICD-10-CM

## 2022-11-14 DIAGNOSIS — L60.0 ONYCHOCRYPTOSIS: ICD-10-CM

## 2022-11-14 DIAGNOSIS — M20.41 HAMMER TOES OF BOTH FEET: ICD-10-CM

## 2022-11-14 DIAGNOSIS — M20.42 HAMMER TOES OF BOTH FEET: ICD-10-CM

## 2022-11-14 DIAGNOSIS — M79.672 FOOT PAIN, BILATERAL: ICD-10-CM

## 2022-11-14 DIAGNOSIS — M20.12 VALGUS DEFORMITY OF BOTH GREAT TOES: Primary | ICD-10-CM

## 2022-11-14 DIAGNOSIS — M20.11 VALGUS DEFORMITY OF BOTH GREAT TOES: Primary | ICD-10-CM

## 2022-11-14 DIAGNOSIS — B35.1 ONYCHOMYCOSIS: ICD-10-CM

## 2022-11-14 DIAGNOSIS — M79.671 FOOT PAIN, BILATERAL: ICD-10-CM

## 2022-11-14 PROCEDURE — 97597 DBRDMT OPN WND 1ST 20 CM/<: CPT | Performed by: PODIATRIST

## 2022-11-14 PROCEDURE — 11721 DEBRIDE NAIL 6 OR MORE: CPT | Performed by: PODIATRIST

## 2022-11-14 RX ORDER — CHLORHEXIDINE GLUCONATE 0.12 MG/ML
RINSE ORAL
COMMUNITY
Start: 2022-11-02

## 2022-11-14 RX ORDER — AMOXICILLIN 875 MG/1
TABLET, COATED ORAL
COMMUNITY
Start: 2022-10-27 | End: 2022-12-05

## 2022-11-14 NOTE — PROGRESS NOTES
[facility] - PODIATRY    Today's Date: 11/14/22    Patient Name: Erik Bhatia  MRN: [id number]  CSN: [id number]  PCP: Marco Antonio Leonard MD, last PCP visit: 8/10/2022  Referring Provider: No ref. provider found    SUBJECTIVE     Chief Complaint   Patient presents with   • Left Foot - Follow-up, Nail Problem   • Right Foot - Follow-up, Nail Problem     HPI: Erik Bhatia, a 77 y.o.female, comes presents to clinic with chief complaint of:    New, Established, New Problem: Established    Location:  hyperkeratotic lesion(s) on dorsal right second proximal interphalangeal joint    Duration: 2015    Onset:  gradual    Nature:  sore    Stable, worsening, improving: Stable, recurring    Aggravating factors:  Patient reports lesion(s) is painful with shoegear and ambulation.      Previous Treatment:   Debridement  ---------------------------  New, Established, New Problem: est    Location: Toenails    Duration:  Greater than five years    Onset: Gradual    Nature: sore with palpation.    Stable, worsening, improving: stable    Aggravating factors: Pain with shoe gear and ambulation.    Previous Treatment:  Debridement    Medical changes:  none.    Patient denies any fevers, chills, nausea, vomiting, shortness of breathe, nor any other constitutional signs nor symptoms.    Patient request refill for Silvadene in case ulceration on her second toe opens up before her next visit.    Past Medical History:   Diagnosis Date   • Arthritis    • Asthma    • Bladder disorder    • Bunion    • Chronic allergic rhinitis    • Corns and callus    • GERD (gastroesophageal reflux disease)    • Hammertoe    • Ingrown toenail    • Limb pain    • Limb swelling    • Numbness in feet    • SOB (shortness of breath)      Past Surgical History:   Procedure Laterality Date   • BREAST BIOPSY     • CARPAL TUNNEL RELEASE     • TRIGGER FINGER RELEASE       Family History   Problem Relation Age of Onset   • Stomach cancer Father    •  Diabetes Father    • Diabetes Maternal Grandfather      Social History     Socioeconomic History   • Marital status:    Tobacco Use   • Smoking status: Never   • Smokeless tobacco: Never   Vaping Use   • Vaping Use: Never used   Substance and Sexual Activity   • Alcohol use: Never   • Drug use: Never   • Sexual activity: Not Currently     Partners: Male     Allergies   Allergen Reactions   • Seasonal Ic [Kelp-B6-Lecithin-Vinegar] Cough   • Nitrofurantoin Provider Review Needed     Current Outpatient Medications   Medication Sig Dispense Refill   • aluminum hydroxide-magnesium carbonate (Gaviscon)  MG/15ML suspension oral suspension 1 mL.     • amoxicillin (AMOXIL) 875 MG tablet 1 bid     • budesonide-formoterol (SYMBICORT) 160-4.5 MCG/ACT inhaler Inhale 2 puffs 2 (Two) Times a Day. 10.2 g 1   • chlorhexidine (PERIDEX) 0.12 % solution      • Cholecalciferol 125 MCG (5000 UT) tablet 1 tablet Daily.     • Diclofenac Sodium (VOLTAREN) 1 % gel gel Apply 4 g topically to the appropriate area as directed 4 (Four) Times a Day As Needed (right shoulder pain). 150 g 1   • estradiol (ESTRACE) 0.1 MG/GM vaginal cream Insert 1 g into the vagina 2 (Two) Times a Week. 42.5 g 3   • famotidine (Pepcid) 20 MG tablet Take 1 tablet by mouth 2 (Two) Times a Day. 180 tablet 1   • fluconazole (Diflucan) 150 MG tablet One  tab po now and repeat in 1 week if not better 2 tablet 0   • fluticasone (Flonase) 50 MCG/ACT nasal spray 2 sprays into the nostril(s) as directed by provider Daily. 45 mL 3   • montelukast (SINGULAIR) 10 MG tablet TAKE 1 TABLET DAILY 90 tablet 3   • olopatadine (PATADAY) 0.2 % solution ophthalmic solution 1 drop.     • omeprazole (priLOSEC) 20 MG capsule TAKE 1 CAPSULE DAILY 90 capsule 3   • potassium chloride (MICRO-K) 10 MEQ CR capsule TAKE 2 CAPSULES DAILY. 180 capsule 3   • triamcinolone (KENALOG) 0.1 % ointment      • Vyvanse 50 MG capsule Take 1 capsule by mouth Every Morning 30 capsule 0   •  benzonatate (Tessalon Perles) 100 MG capsule Take 1 capsule by mouth 3 (Three) Times a Day As Needed for Cough. 30 capsule 0   • cetirizine-pseudoephedrine (ZyrTEC-D Allergy & Congestion) 5-120 MG per 12 hr tablet Take 1 tablet by mouth Daily. (Patient taking differently: Take 1 tablet by mouth 2 (Two) Times a Day.) 90 tablet 3   • silver sulfadiazine (Silvadene) 1 % cream Apply 1 application topically to the appropriate area as directed 2 (Two) Times a Day. 1 each 5     Current Facility-Administered Medications   Medication Dose Route Frequency Provider Last Rate Last Admin   • ferric carboxymaltose (INJECTAFER) injection solution 750 mg  750 mg Intravenous Once Alda Borges MD         Review of Systems   Constitutional: Negative.    Skin:        Painful hyperkeratotic lesion and toenails   All other systems reviewed and are negative.      OBJECTIVE     Vitals:    11/14/22 1425   BP: 132/59   Pulse: 106   Temp: 97.6 °F (36.4 °C)   SpO2: 98%       Body mass index is 31.25 kg/m².    Lab Results   Component Value Date    GLUCOSE 101 (H) 08/08/2022    CALCIUM 9.9 08/08/2022     08/08/2022    K 3.9 08/08/2022    CO2 26.0 08/08/2022     08/08/2022    BUN 11 08/08/2022    CREATININE 0.63 08/08/2022    EGFRIFNONA 79 09/10/2021    BCR 17.5 08/08/2022    ANIONGAP 11.0 08/08/2022       Patient seen in no apparent distress.      PHYSICAL EXAM:     Foot/Ankle Exam:       General:   Appearance: elderly    Orientation: AAOx3    Affect: appropriate    Gait: unimpaired    Shoe Gear:  Casual shoes    VASCULAR      Right Foot Vascularity   Normal vascular exam    Dorsalis pedis:  2+  Posterior tibial:  2+  Skin Temperature: warm    Edema Grading:  None  CFT:  < 3 seconds  Pedal Hair Growth:  Present  Varicosities: none       Left Foot Vascularity   Normal vascular exam    Dorsalis pedis:  2+  Posterior tibial:  2+  Skin Temperature: warm    Edema Grading:  None  CFT:  < 3 seconds  Pedal Hair Growth:   Present  Varicosities: none        NEUROLOGIC     Right Foot Neurologic   Normal sensation    Light touch sensation:  Normal  Vibratory sensation:  Normal  Hot/Cold sensation: normal    Protective Sensation using [place]Ayush Monofilament:  10     Left Foot Neurologic   Normal sensation    Light touch sensation:  Normal  Vibratory sensation:  Normal  Hot/cold sensation: normal    Protective Sensation using [place]Ayush Monofilament:  10     MUSCULOSKELETAL      Right Foot Musculoskeletal   Hammertoe:  Second toe, third toe, fourth toe and fifth toe  Hallux valgus: Yes       Left Foot Musculoskeletal   Hammertoe:  Second toe, third toe, fourth toe and fifth toe  Hallux valgus: Yes       MUSCLE STRENGTH     Right Foot Muscle Strength   Foot dorsiflexion:  4  Foot plantar flexion:  4  Foot inversion:  4  Foot eversion:  4     Left Foot Muscle Strength   Foot dorsiflexion:  4  Foot plantar flexion:  4  Foot inversion:  4  Foot eversion:  4     RANGE OF MOTION      Right Foot Range of Motion   Foot and ankle ROM within normal limits       Left Foot Range of Motion   Foot and ankle ROM within normal limits       DERMATOLOGIC     Right Foot Dermatologic   Skin: ulcer    Nails: onychomycosis, abnormally thick, subungual debris, dystrophic nails and ingrown toenail    Nails comment:  Toenails 1, 2, 3, 4, and 5     Left Foot Dermatologic   Skin: skin intact    Nails: onychomycosis, abnormally thick, subungual debris, dystrophic nails and ingrown toenail    Nails comment:  Toenails 1, 2, 3, 4, and 5      Right Foot Additional Comments Stage 1 ulceration on the dorsal right second PIP joint area of the foot.  Hyperkeratotic tissue with subepidermal hemosiderin deposition is present.  No surrounding, edema, erythema, lymphangitis, fluctuance, nor signs of infection.  No drainage present.  11 mm x 10 mm x 3 mm.  This area was debrided via excisional partial thickness debridement with a 15 scalpel blade.  Post debridement  measurements were 9 mm x 8 mm x 1 mm in depth.        ASSESSMENT/PLAN     Diagnoses and all orders for this visit:    1. 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 Foot pain, bilateral    3. Onychocryptosis    4. Onychomycosis    5. Hammer toes of both feet    6. Pressure injury of toe of right foot, stage 1  -     silver sulfadiazine (Silvadene) 1 % cream; Apply 1 application topically to the appropriate area as directed 2 (Two) Times a Day.  Dispense: 1 each; Refill: 5        Comprehensive lower extremity examination and evaluation was performed.    Discussed findings and treatment plan including risks, benefits, and treatment options with patient in detail. Patient agreed with treatment plan.    Toenails 1 through 5 bilaterally were debrided in thickness and length with toenail tremors.  These were then smoothed with a dermal sanding bur.  Patient tolerated the procedure well.    An After Visit Summary was printed and given to the patient at discharge, including (if requested) any available informative/educational handouts regarding diagnosis, treatment, or medications. All questions were answered to patient/family satisfaction. Should symptoms fail to improve or worsen they agree to call or return to clinic or to go to the Emergency Department. Discussed the importance of following up with any needed screening tests/labs/specialist appointments and any requested follow-up recommended by me today. Importance of maintaining follow-up discussed and patient accepts that missed appointments can delay diagnosis and potentially lead to worsening of conditions.    Return in about 9 weeks (around 1/16/2023) for Toenail Care, Ulcer Follow-Up., or sooner if acute issues arise.    This document has been electronically signed by Rian Jo DPM on November 14, 2022 15:07 EST

## 2022-11-21 DIAGNOSIS — F98.8 ATTENTION DEFICIT DISORDER (ADD) WITHOUT HYPERACTIVITY: ICD-10-CM

## 2022-11-21 RX ORDER — BUDESONIDE AND FORMOTEROL FUMARATE DIHYDRATE 160; 4.5 UG/1; UG/1
2 AEROSOL RESPIRATORY (INHALATION)
Qty: 10.2 G | Refills: 1 | Status: SHIPPED | OUTPATIENT
Start: 2022-11-21 | End: 2023-01-26 | Stop reason: SDUPTHER

## 2022-11-21 RX ORDER — LISDEXAMFETAMINE DIMESYLATE 50 MG
50 CAPSULE ORAL EVERY MORNING
Qty: 30 CAPSULE | Refills: 0 | Status: SHIPPED | OUTPATIENT
Start: 2022-11-21 | End: 2022-12-21 | Stop reason: SDUPTHER

## 2022-11-21 NOTE — TELEPHONE ENCOUNTER
Caller: Erik Bhatia    Relationship: Self    Best call back number: [phone]    Requested Prescriptions:   Requested Prescriptions     Pending Prescriptions Disp Refills   • budesonide-formoterol (SYMBICORT) 160-4.5 MCG/ACT inhaler 10.2 g 1     Sig: Inhale 2 puffs 2 (Two) Times a Day.        Pharmacy where request should be sent: [facility] [id number]  [facility] [place] [facility] [address] AT [facility] [address] - [phone] [facility] [phone] FX     Additional details provided by patient: PATIENT IS REQUESTING FOR A REFILL. PLEASE CALL AND AND ADVISE.     Does the patient have less than a 3 day supply:  [x] Yes  [] No    Rosanna Woody Rep   11/21/22 15:15 EST

## 2022-11-22 ENCOUNTER — OFFICE VISIT (OUTPATIENT)
Dept: INTERNAL MEDICINE | Facility: CLINIC | Age: 77
End: 2022-11-22

## 2022-11-22 VITALS
TEMPERATURE: 97.4 F | BODY MASS INDEX: 31.8 KG/M2 | HEIGHT: 60 IN | OXYGEN SATURATION: 95 % | DIASTOLIC BLOOD PRESSURE: 75 MMHG | RESPIRATION RATE: 19 BRPM | SYSTOLIC BLOOD PRESSURE: 139 MMHG | WEIGHT: 162 LBS | HEART RATE: 79 BPM

## 2022-11-22 DIAGNOSIS — J45.901 MODERATE ASTHMA WITH ACUTE EXACERBATION, UNSPECIFIED WHETHER PERSISTENT: Primary | ICD-10-CM

## 2022-11-22 DIAGNOSIS — J20.9 ACUTE BRONCHITIS, UNSPECIFIED ORGANISM: ICD-10-CM

## 2022-11-22 PROCEDURE — 99214 OFFICE O/P EST MOD 30 MIN: CPT | Performed by: NURSE PRACTITIONER

## 2022-11-22 RX ORDER — AZITHROMYCIN 250 MG/1
TABLET, FILM COATED ORAL
Qty: 6 TABLET | Refills: 0 | Status: SHIPPED | OUTPATIENT
Start: 2022-11-22 | End: 2022-12-05

## 2022-11-22 RX ORDER — BENZONATATE 100 MG/1
100 CAPSULE ORAL 3 TIMES DAILY PRN
Qty: 30 CAPSULE | Refills: 0 | Status: SHIPPED | OUTPATIENT
Start: 2022-11-22 | End: 2022-12-05

## 2022-11-22 RX ORDER — PREDNISONE 20 MG/1
TABLET ORAL
Qty: 10 TABLET | Refills: 0 | Status: SHIPPED | OUTPATIENT
Start: 2022-11-22 | End: 2022-12-05

## 2022-11-22 NOTE — PROGRESS NOTES
Chief Complaint  Follow-up (Failed Covid Screening), Headache, Cough (Mainly at night), and Wheezing (Mainly at night )  Subjective        History of Present Illness  Erik Bhatia is a 77 y.o. female who presents to [facility] for complaint of cough, wheezing and headache for 4 days.  Symptoms started after being outdoors where the leads had fallen.  She is using Symbicort, tessalon and Vicks rub.      Review of Systems  Constitutional: Negative for loss of appetite and fever.   Lymphatic: Negative for painful or swollen nodes.   HEENT: Negative for sinus pain and earache. Positive for scratchy throat.   Cardiovascular: Negative for chest pain.   Respiratory: Negative for dyspnea.   Gastrointestinal: Negative for nausea, vomiting and diarrhea.   Integumentary: Negative for rash.   Musculoskeletal: Negative for myalgia.       Past Medical History:   Diagnosis Date   • ADHD (attention deficit hyperactivity disorder) about 2000   • Allergic ?   • Anemia ?   • Arthritis    • Asthma    • Bladder disorder    • Bunion    • Chronic allergic rhinitis    • Corns and callus    • GERD (gastroesophageal reflux disease)    • Hammertoe    • Ingrown toenail    • Limb pain    • Limb swelling    • Numbness in feet    • SOB (shortness of breath)         Past Surgical History:   Procedure Laterality Date   • BREAST BIOPSY     • CARPAL TUNNEL RELEASE     • COLONOSCOPY  2020 and before   • TRIGGER FINGER RELEASE          Allergies   Allergen Reactions   • Seasonal Ic [Kelp-B6-Lecithin-Vinegar] Cough   • Nitrofurantoin Provider Review Needed          Current Outpatient Medications:   •  aluminum hydroxide-magnesium carbonate (Gaviscon)  MG/15ML suspension oral suspension, 1 mL., Disp: , Rfl:   •  amoxicillin (AMOXIL) 875 MG tablet, 1 bid, Disp: , Rfl:   •  benzonatate (Tessalon Perles) 100 MG capsule, Take 1 capsule by mouth 3 (Three) Times a Day As Needed for Cough., Disp: 30 capsule, Rfl:  0  •  budesonide-formoterol (SYMBICORT) 160-4.5 MCG/ACT inhaler, Inhale 2 puffs 2 (Two) Times a Day., Disp: 10.2 g, Rfl: 1  •  cetirizine-pseudoephedrine (ZyrTEC-D Allergy & Congestion) 5-120 MG per 12 hr tablet, Take 1 tablet by mouth Daily. (Patient taking differently: Take 1 tablet by mouth 2 (Two) Times a Day.), Disp: 90 tablet, Rfl: 3  •  chlorhexidine (PERIDEX) 0.12 % solution, , Disp: , Rfl:   •  Cholecalciferol 125 MCG (5000 UT) tablet, 1 tablet Daily., Disp: , Rfl:   •  Diclofenac Sodium (VOLTAREN) 1 % gel gel, Apply 4 g topically to the appropriate area as directed 4 (Four) Times a Day As Needed (right shoulder pain)., Disp: 150 g, Rfl: 1  •  estradiol (ESTRACE) 0.1 MG/GM vaginal cream, Insert 1 g into the vagina 2 (Two) Times a Week., Disp: 42.5 g, Rfl: 3  •  famotidine (Pepcid) 20 MG tablet, Take 1 tablet by mouth 2 (Two) Times a Day., Disp: 180 tablet, Rfl: 1  •  fluconazole (Diflucan) 150 MG tablet, One  tab po now and repeat in 1 week if not better, Disp: 2 tablet, Rfl: 0  •  fluticasone (Flonase) 50 MCG/ACT nasal spray, 2 sprays into the nostril(s) as directed by provider Daily., Disp: 45 mL, Rfl: 3  •  montelukast (SINGULAIR) 10 MG tablet, TAKE 1 TABLET DAILY, Disp: 90 tablet, Rfl: 3  •  olopatadine (PATADAY) 0.2 % solution ophthalmic solution, 1 drop., Disp: , Rfl:   •  omeprazole (priLOSEC) 20 MG capsule, TAKE 1 CAPSULE DAILY, Disp: 90 capsule, Rfl: 3  •  potassium chloride (MICRO-K) 10 MEQ CR capsule, TAKE 2 CAPSULES DAILY., Disp: 180 capsule, Rfl: 3  •  silver sulfadiazine (Silvadene) 1 % cream, Apply 1 application topically to the appropriate area as directed 2 (Two) Times a Day., Disp: 1 each, Rfl: 5  •  triamcinolone (KENALOG) 0.1 % ointment, , Disp: , Rfl:   •  Vyvanse 50 MG capsule, Take 1 capsule by mouth Every Morning, Disp: 30 capsule, Rfl: 0  •  azithromycin (Zithromax Z-Bam) 250 MG tablet, Take 2 tablets by mouth on day 1, then 1 tablet daily on days 2-5, Disp: 6 tablet, Rfl: 0  •   "predniSONE (DELTASONE) 20 MG tablet, Take 2 tabs each morning with food for 5 days., Disp: 10 tablet, Rfl: 0    Current Facility-Administered Medications:   •  ferric carboxymaltose (INJECTAFER) injection solution 750 mg, 750 mg, Intravenous, Once, Alda Borges MD    Objective   /75 (BP Location: Right arm, Patient Position: Sitting, Cuff Size: Adult)   Pulse 79   Temp 97.4 °F (36.3 °C) (Temporal)   Resp 19   Ht 152.4 cm (60\")   Wt 73.5 kg (162 lb)   SpO2 95% Comment: room air  BMI 31.64 kg/m²    Estimated body mass index is 31.64 kg/m² as calculated from the following:    Height as of this encounter: 152.4 cm (60\").    Weight as of this encounter: 73.5 kg (162 lb).   Physical Exam  Vitals reviewed.   Constitutional:       General: She is not in acute distress.     Appearance: Normal appearance.   HENT:      Head: Normocephalic and atraumatic.   Pulmonary:      Effort: Pulmonary effort is normal.   Neurological:      General: No focal deficit present.      Mental Status: She is alert.   Psychiatric:         Thought Content: Thought content normal.              Assessment and Plan    Diagnoses and all orders for this visit:    1. Moderate asthma with acute exacerbation, unspecified whether persistent (Primary)    2. Acute bronchitis, unspecified organism  -     benzonatate (Tessalon Perles) 100 MG capsule; Take 1 capsule by mouth 3 (Three) Times a Day As Needed for Cough.  Dispense: 30 capsule; Refill: 0    Other orders  -     predniSONE (DELTASONE) 20 MG tablet; Take 2 tabs each morning with food for 5 days.  Dispense: 10 tablet; Refill: 0  -     azithromycin (Zithromax Z-Bam) 250 MG tablet; Take 2 tablets by mouth on day 1, then 1 tablet daily on days 2-5  Dispense: 6 tablet; Refill: 0    Increase fluid intake.  Continue Symbicort as directed.  Start prednisone and Z-Bam as directed.  Refill Tessalon 100 mg 3 times daily as needed for cough.  Education on diagnosis, medication and treatment plan.  " RTC if not improving.  Symptoms worsen or difficulty with breathing seek emergency medical treatment.    Follow Up     Patient was given instructions and counseling regarding her condition. Please see specific information pulled into the AVS if appropriate.   Return if symptoms worsen or fail to improve.    HUMBERTO Márquez

## 2022-12-02 ENCOUNTER — LAB (OUTPATIENT)
Dept: LAB | Facility: HOSPITAL | Age: 77
End: 2022-12-02

## 2022-12-02 DIAGNOSIS — D50.8 IRON DEFICIENCY ANEMIA DUE TO DIETARY CAUSES: ICD-10-CM

## 2022-12-02 DIAGNOSIS — E55.9 VITAMIN D DEFICIENCY: ICD-10-CM

## 2022-12-02 DIAGNOSIS — E78.2 MIXED HYPERLIPIDEMIA: ICD-10-CM

## 2022-12-02 DIAGNOSIS — K21.9 GASTROESOPHAGEAL REFLUX DISEASE WITHOUT ESOPHAGITIS: ICD-10-CM

## 2022-12-02 LAB
25(OH)D3 SERPL-MCNC: 29.8 NG/ML (ref 30–100)
ALBUMIN SERPL-MCNC: 4.2 G/DL (ref 3.5–5.2)
ALBUMIN/GLOB SERPL: 1.4 G/DL
ALP SERPL-CCNC: 82 U/L (ref 39–117)
ALT SERPL W P-5'-P-CCNC: 19 U/L (ref 1–33)
ANION GAP SERPL CALCULATED.3IONS-SCNC: 12.7 MMOL/L (ref 5–15)
AST SERPL-CCNC: 22 U/L (ref 1–32)
BASOPHILS # BLD AUTO: 0.07 10*3/MM3 (ref 0–0.2)
BASOPHILS NFR BLD AUTO: 0.8 % (ref 0–1.5)
BILIRUB SERPL-MCNC: 0.3 MG/DL (ref 0–1.2)
BUN SERPL-MCNC: 20 MG/DL (ref 8–23)
BUN/CREAT SERPL: 27.8 (ref 7–25)
CALCIUM SPEC-SCNC: 9.7 MG/DL (ref 8.6–10.5)
CHLORIDE SERPL-SCNC: 101 MMOL/L (ref 98–107)
CO2 SERPL-SCNC: 26.3 MMOL/L (ref 22–29)
CREAT SERPL-MCNC: 0.72 MG/DL (ref 0.57–1)
DEPRECATED RDW RBC AUTO: 38.2 FL (ref 37–54)
EGFRCR SERPLBLD CKD-EPI 2021: 86.2 ML/MIN/1.73
EOSINOPHIL # BLD AUTO: 0.54 10*3/MM3 (ref 0–0.4)
EOSINOPHIL NFR BLD AUTO: 6.2 % (ref 0.3–6.2)
ERYTHROCYTE [DISTWIDTH] IN BLOOD BY AUTOMATED COUNT: 12 % (ref 12.3–15.4)
FERRITIN SERPL-MCNC: 282 NG/ML (ref 13–150)
GLOBULIN UR ELPH-MCNC: 2.9 GM/DL
GLUCOSE SERPL-MCNC: 89 MG/DL (ref 65–99)
HCT VFR BLD AUTO: 37.6 % (ref 34–46.6)
HGB BLD-MCNC: 12 G/DL (ref 12–15.9)
IMM GRANULOCYTES # BLD AUTO: 0.03 10*3/MM3 (ref 0–0.05)
IMM GRANULOCYTES NFR BLD AUTO: 0.3 % (ref 0–0.5)
IRON 24H UR-MRATE: 73 MCG/DL (ref 37–145)
IRON SATN MFR SERPL: 21 % (ref 20–50)
LYMPHOCYTES # BLD AUTO: 3.05 10*3/MM3 (ref 0.7–3.1)
LYMPHOCYTES NFR BLD AUTO: 34.7 % (ref 19.6–45.3)
MCH RBC QN AUTO: 27.9 PG (ref 26.6–33)
MCHC RBC AUTO-ENTMCNC: 31.9 G/DL (ref 31.5–35.7)
MCV RBC AUTO: 87.4 FL (ref 79–97)
MONOCYTES # BLD AUTO: 0.92 10*3/MM3 (ref 0.1–0.9)
MONOCYTES NFR BLD AUTO: 10.5 % (ref 5–12)
NEUTROPHILS NFR BLD AUTO: 4.17 10*3/MM3 (ref 1.7–7)
NEUTROPHILS NFR BLD AUTO: 47.5 % (ref 42.7–76)
NRBC BLD AUTO-RTO: 0 /100 WBC (ref 0–0.2)
PLATELET # BLD AUTO: 265 10*3/MM3 (ref 140–450)
PMV BLD AUTO: 10.8 FL (ref 6–12)
POTASSIUM SERPL-SCNC: 3.5 MMOL/L (ref 3.5–5.2)
PROT SERPL-MCNC: 7.1 G/DL (ref 6–8.5)
RBC # BLD AUTO: 4.3 10*6/MM3 (ref 3.77–5.28)
SODIUM SERPL-SCNC: 140 MMOL/L (ref 136–145)
TIBC SERPL-MCNC: 352 MCG/DL (ref 298–536)
TRANSFERRIN SERPL-MCNC: 236 MG/DL (ref 200–360)
WBC NRBC COR # BLD: 8.78 10*3/MM3 (ref 3.4–10.8)

## 2022-12-02 PROCEDURE — 84466 ASSAY OF TRANSFERRIN: CPT

## 2022-12-02 PROCEDURE — 36415 COLL VENOUS BLD VENIPUNCTURE: CPT

## 2022-12-02 PROCEDURE — 83540 ASSAY OF IRON: CPT

## 2022-12-02 PROCEDURE — 80053 COMPREHEN METABOLIC PANEL: CPT

## 2022-12-02 PROCEDURE — 82728 ASSAY OF FERRITIN: CPT

## 2022-12-02 PROCEDURE — 82306 VITAMIN D 25 HYDROXY: CPT

## 2022-12-02 PROCEDURE — 85025 COMPLETE CBC W/AUTO DIFF WBC: CPT

## 2022-12-05 ENCOUNTER — OFFICE VISIT (OUTPATIENT)
Dept: INTERNAL MEDICINE | Facility: CLINIC | Age: 77
End: 2022-12-05

## 2022-12-05 VITALS
HEIGHT: 60 IN | WEIGHT: 160 LBS | SYSTOLIC BLOOD PRESSURE: 140 MMHG | TEMPERATURE: 98.1 F | DIASTOLIC BLOOD PRESSURE: 80 MMHG | BODY MASS INDEX: 31.41 KG/M2

## 2022-12-05 DIAGNOSIS — E55.9 VITAMIN D DEFICIENCY: ICD-10-CM

## 2022-12-05 DIAGNOSIS — D50.8 IRON DEFICIENCY ANEMIA DUE TO DIETARY CAUSES: ICD-10-CM

## 2022-12-05 DIAGNOSIS — E53.8 VITAMIN B12 DEFICIENCY: ICD-10-CM

## 2022-12-05 DIAGNOSIS — Z23 NEED FOR VACCINATION: ICD-10-CM

## 2022-12-05 DIAGNOSIS — E78.2 MIXED HYPERLIPIDEMIA: ICD-10-CM

## 2022-12-05 DIAGNOSIS — E87.6 HYPOKALEMIA: ICD-10-CM

## 2022-12-05 DIAGNOSIS — I35.8 AORTIC HEART MURMUR: ICD-10-CM

## 2022-12-05 DIAGNOSIS — J45.40 MODERATE PERSISTENT ASTHMA WITHOUT COMPLICATION: Primary | ICD-10-CM

## 2022-12-05 DIAGNOSIS — K21.9 GASTROESOPHAGEAL REFLUX DISEASE WITHOUT ESOPHAGITIS: ICD-10-CM

## 2022-12-05 PROCEDURE — 99214 OFFICE O/P EST MOD 30 MIN: CPT | Performed by: INTERNAL MEDICINE

## 2022-12-05 PROCEDURE — 90662 IIV NO PRSV INCREASED AG IM: CPT | Performed by: INTERNAL MEDICINE

## 2022-12-05 PROCEDURE — G0008 ADMIN INFLUENZA VIRUS VAC: HCPCS | Performed by: INTERNAL MEDICINE

## 2022-12-05 RX ORDER — CETIRIZINE HYDROCHLORIDE, PSEUDOEPHEDRINE HYDROCHLORIDE 5; 120 MG/1; MG/1
1 TABLET, FILM COATED, EXTENDED RELEASE ORAL 2 TIMES DAILY
Qty: 180 TABLET | Refills: 1 | Status: SHIPPED | OUTPATIENT
Start: 2022-12-05

## 2022-12-05 NOTE — ASSESSMENT & PLAN NOTE
Vitamin D level is up slightly to 30 as of her 12/22 office visit.  She is on a high-dose over-the-counter supplementation, but uses it intermittently.  She will try to take it more religiously, will repeat level next year.

## 2022-12-05 NOTE — ASSESSMENT & PLAN NOTE
Patient with chronic history of this and Dr. Borges has her on 20 mill equivalents daily.  She is not on any diuretics, she just seems to waste the potassium.  Her level is down from 4 3.5 as of her 12/22 office visit secondary to missing some doses of it here lately.  Certainly appropriate to continue current dose.

## 2022-12-05 NOTE — ASSESSMENT & PLAN NOTE
Patient had a early flare 11/22/2022 and was seen by Teresa here in the office.  She was treated with some steroids.  She is improved, she is utilizing her Symbicort and Singulair daily, so continue same along with Zyrtec-RANDALL.

## 2022-12-05 NOTE — PROGRESS NOTES
"Chief Complaint  Heartburn and Follow-up (Pt states that this is routine, she had labs. She has no new issues. )    Symone Bhatia presents to [facility]    Heartburn  She reports no abdominal pain, no chest pain, no coughing or no wheezing. Pertinent negatives include no fatigue.     History of present illness:  76 yo female, former pt of Dr Posey, who has has underlying ADD, RAD, GERD, RLS, among others.  She was seen 8/22 as a New Patient, she is coming in now 12/22 for routine 4-month follow-up.  We will go over her med list, review any recent labs, and make further recommendations at that time.    Review of Systems   Constitutional: Negative for appetite change, fatigue and fever.   HENT: Negative for congestion and ear pain.    Eyes: Negative for blurred vision.   Respiratory: Negative for cough, chest tightness, shortness of breath and wheezing.    Cardiovascular: Negative for chest pain, palpitations and leg swelling.   Gastrointestinal: Negative for abdominal pain.   Genitourinary: Negative for difficulty urinating, dysuria and hematuria.   Musculoskeletal: Negative for arthralgias and gait problem.   Skin: Negative for skin lesions.   Neurological: Negative for syncope, memory problem and confusion.   Psychiatric/Behavioral: Negative for self-injury and depressed mood.       Objective   Vital Signs:   /80   Temp 98.1 °F (36.7 °C) (Skin)   Ht 152.4 cm (60\")   Wt 72.6 kg (160 lb)   BMI 31.25 kg/m²           Physical Exam  Vitals and nursing note reviewed.   Constitutional:       General: She is not in acute distress.     Appearance: Normal appearance. She is not toxic-appearing.   HENT:      Head: Atraumatic.      Right Ear: External ear normal.      Left Ear: External ear normal.      Nose: Nose normal.      Mouth/Throat:      Mouth: Mucous membranes are moist.   Eyes:      General:         Right eye: No discharge.         Left eye: No " discharge.      Extraocular Movements: Extraocular movements intact.      Pupils: Pupils are equal, round, and reactive to light.   Cardiovascular:      Rate and Rhythm: Normal rate and regular rhythm.      Pulses: Normal pulses.      Heart sounds: Normal heart sounds. No murmur heard.    No gallop.   Pulmonary:      Effort: Pulmonary effort is normal. No respiratory distress.      Breath sounds: No wheezing, rhonchi or rales.   Abdominal:      General: There is no distension.      Palpations: Abdomen is soft. There is no mass.      Tenderness: There is no abdominal tenderness. There is no guarding.   Musculoskeletal:         General: No swelling or tenderness.      Cervical back: No tenderness.      Right lower leg: No edema.      Left lower leg: No edema.   Skin:     General: Skin is warm and dry.      Findings: No rash.   Neurological:      General: No focal deficit present.      Mental Status: She is alert and oriented to person, place, and time. Mental status is at baseline.      Motor: No weakness.      Gait: Gait normal.   Psychiatric:         Mood and Affect: Mood normal.         Thought Content: Thought content normal.          Result Review   The following data was reviewed by: Marco Antonio Leonard MD on 08/10/2022:  [x] Laboratory  [] Microbiology  [] Radiology  [] EKG/telemetry  [] Cardiology/Vascular  [] Pathology  [x] Old records             Assessment and Plan   Diagnoses and all orders for this visit:    1. Moderate persistent asthma without complication (Primary)  Assessment & Plan:  Patient had a early flare 11/22/2022 and was seen by Teresa here in the office.  She was treated with some steroids.  She is improved, she is utilizing her Symbicort and Singulair daily, so continue same along with Zyrtec-D.      2. Aortic heart murmur  Overview:  Echo 9/22:  • Left ventricular ejection fraction appears to be 61 - 65%.  • Left ventricular diastolic function is consistent with (grade Ia w/high LAP) impaired  relaxation.  • No evidence of significant valvular disease      Assessment & Plan:  I reviewed the 9/22 echo with the patient at her 12/22 appointment.  Certainly nothing to be concerned about, only need to repeat echo would be if patient develops symptoms.      3. Hypokalemia  Assessment & Plan:  Patient with chronic history of this and Dr. Borges has her on 20 mill equivalents daily.  She is not on any diuretics, she just seems to waste the potassium.  Her level is down from 4 3.5 as of her 12/22 office visit secondary to missing some doses of it here lately.  Certainly appropriate to continue current dose.    Orders:  -     Comprehensive Metabolic Panel; Future  -     Magnesium; Future    4. Iron deficiency anemia due to dietary causes  Assessment & Plan:  Hemoglobin is fairly stable at 12.0 as of her 12/22 office visit.  She is only recently got the over-the-counter iron supplementation, she has taken a specialized formulation to avoid side effects.  She is only taken 2 of the pills.  Her ferritin is increased from 20 to over 200 following the IV iron much earlier this year.  Discussed with her go ahead and put the iron tablets to the side, will repeat labs in 4 months and figure out if she needs to get back on it at that time.    Orders:  -     CBC & Differential; Future  -     Iron Profile; Future  -     Ferritin; Future    5. Vitamin D deficiency  Assessment & Plan:  Vitamin D level is up slightly to 30 as of her 12/22 office visit.  She is on a high-dose over-the-counter supplementation, but uses it intermittently.  She will try to take it more religiously, will repeat level next year.    Orders:  -     Vitamin D,25-Hydroxy; Future    6. Mixed hyperlipidemia  -     Lipid Panel; Future    7. Vitamin B12 deficiency  Assessment & Plan:  Patient's level was over 2000 last year, she lowered from 2 tablets to 1 tablet daily, and follow-up labs were around 1100 both times.  She will continue same dosing, and will  get labs on return to office in April.    Orders:  -     Vitamin B12 anemia; Future  -     Folate anemia; Future    8. Gastroesophageal reflux disease without esophagitis  Assessment & Plan:  Patient no dysphagia, no significant dyspepsia on chronic PPI as of her 8/22 new patient appointment.  I reviewed with her trying to gradually transition from Prilosec to Pepcid, she will try alternating day to day initially and then stretching it out if tolerates.---> Patient tried this prior to her 12/22 office visit, but unfortunately she did not tolerate it, she had symptoms on the days off from the Prilosec.  Certainly appropriate to continue low-dose Prilosec every day going forward.      Other orders  -     cetirizine-pseudoephedrine (ZyrTEC-D Allergy & Congestion) 5-120 MG per 12 hr tablet; Take 1 tablet by mouth 2 (Two) Times a Day.  Dispense: 180 tablet; Refill: 1  -     Fluzone High-Dose 65+yrs ([id number])        Follow Up   Return in about 4 months (around 4/5/2023).  Patient was given instructions and counseling regarding her condition or for health maintenance advice. Please see specific information pulled into the AVS if appropriate.

## 2022-12-05 NOTE — ASSESSMENT & PLAN NOTE
Patient's level was over 2000 last year, she lowered from 2 tablets to 1 tablet daily, and follow-up labs were around 1100 both times.  She will continue same dosing, and will get labs on return to office in April.

## 2022-12-05 NOTE — ASSESSMENT & PLAN NOTE
I reviewed the 9/22 echo with the patient at her 12/22 appointment.  Certainly nothing to be concerned about, only need to repeat echo would be if patient develops symptoms.

## 2022-12-05 NOTE — PATIENT INSTRUCTIONS
Look for the new bivalent COVID-vaccine, you need to ask for the one that covers more than 1 variant of the coronavirus.  We have Pfizer here, you can get the Moderna at your local pharmacy.  You should be able to get this next week.    2.  About 2 weeks after you get the above COVID-vaccine, you can look into getting the Shingrix shingles vaccine.  You need 2 doses of this about 2 months apart.

## 2022-12-05 NOTE — ASSESSMENT & PLAN NOTE
Patient no dysphagia, no significant dyspepsia on chronic PPI as of her 8/22 new patient appointment.  I reviewed with her trying to gradually transition from Prilosec to Pepcid, she will try alternating day to day initially and then stretching it out if tolerates.---> Patient tried this prior to her 12/22 office visit, but unfortunately she did not tolerate it, she had symptoms on the days off from the Prilosec.  Certainly appropriate to continue low-dose Prilosec every day going forward.

## 2022-12-05 NOTE — ASSESSMENT & PLAN NOTE
Hemoglobin is fairly stable at 12.0 as of her 12/22 office visit.  She is only recently got the over-the-counter iron supplementation, she has taken a specialized formulation to avoid side effects.  She is only taken 2 of the pills.  Her ferritin is increased from 20 to over 200 following the IV iron much earlier this year.  Discussed with her go ahead and put the iron tablets to the side, will repeat labs in 4 months and figure out if she needs to get back on it at that time.

## 2022-12-19 ENCOUNTER — HOSPITAL ENCOUNTER (OUTPATIENT)
Dept: MAMMOGRAPHY | Facility: HOSPITAL | Age: 77
Discharge: HOME | End: 2022-12-19
Admitting: OBSTETRICS & GYNECOLOGY

## 2022-12-19 DIAGNOSIS — Z12.31 ENCOUNTER FOR SCREENING MAMMOGRAM FOR MALIGNANT NEOPLASM OF BREAST: ICD-10-CM

## 2022-12-19 PROCEDURE — 77067 SCR MAMMO BI INCL CAD: CPT

## 2022-12-19 PROCEDURE — 77063 BREAST TOMOSYNTHESIS BI: CPT

## 2022-12-21 DIAGNOSIS — F98.8 ATTENTION DEFICIT DISORDER (ADD) WITHOUT HYPERACTIVITY: ICD-10-CM

## 2022-12-21 RX ORDER — LISDEXAMFETAMINE DIMESYLATE 50 MG
50 CAPSULE ORAL EVERY MORNING
Qty: 30 CAPSULE | Refills: 0 | Status: SHIPPED | OUTPATIENT
Start: 2022-12-21 | End: 2023-01-26 | Stop reason: SDUPTHER

## 2022-12-21 NOTE — TELEPHONE ENCOUNTER
Caller: Erik Bhatia    Relationship: Self    Best call back number:[phone]    Requested Prescriptions:   Requested Prescriptions     Pending Prescriptions Disp Refills   • Vyvanse 50 MG capsule 30 capsule 0     Sig: Take 1 capsule by mouth Every Morning        Pharmacy where request should be sent: [id number] [place] [facility] [address] AT [facility] [address] - [phone]  - [phone] FX     Additional details provided by patient: PATIENT IS COMPLETELY OUT OF MEDICATION. PLEASE SEND NEW PRESCRIPTION TO PHARMACY ASAP TODAY.    Does the patient have less than a 3 day supply:  [x] Yes  [] No    Would you like a call back once the refill request has been completed: [] Yes [] No    If the office needs to give you a call back, can they leave a voicemail: [] Yes [] No    Rosanna Aly Rep   12/21/22 10:19 EST

## 2022-12-22 ENCOUNTER — TELEPHONE (OUTPATIENT)
Dept: INTERNAL MEDICINE | Facility: CLINIC | Age: 77
End: 2022-12-22
Payer: MEDICARE

## 2022-12-22 NOTE — TELEPHONE ENCOUNTER
Pt states that 1 week ago she picked up a 20lb of bird feed and re-injured his right shoulder. She is wanting to know if she can get an x-ray or what recommendations you would have for her. Please advise. I let her knowt hat it would be sometime tomorrow before you seen this message.

## 2022-12-23 DIAGNOSIS — M25.511 ACUTE PAIN OF RIGHT SHOULDER: Primary | ICD-10-CM

## 2023-01-12 ENCOUNTER — OFFICE VISIT (OUTPATIENT)
Dept: ORTHOPEDIC SURGERY | Facility: CLINIC | Age: 78
End: 2023-01-12
Payer: MEDICARE

## 2023-01-12 VITALS — BODY MASS INDEX: 31.41 KG/M2 | HEART RATE: 76 BPM | HEIGHT: 60 IN | WEIGHT: 160 LBS | OXYGEN SATURATION: 97 %

## 2023-01-12 DIAGNOSIS — M25.511 RIGHT SHOULDER PAIN, UNSPECIFIED CHRONICITY: Primary | ICD-10-CM

## 2023-01-12 PROCEDURE — 99204 OFFICE O/P NEW MOD 45 MIN: CPT | Performed by: ORTHOPAEDIC SURGERY

## 2023-01-12 RX ORDER — MELOXICAM 15 MG/1
15 TABLET ORAL DAILY
Qty: 30 TABLET | Refills: 0 | Status: SHIPPED | OUTPATIENT
Start: 2023-01-12 | End: 2023-04-05

## 2023-01-12 NOTE — PROGRESS NOTES
"Chief Complaint  Initial Evaluation of the Right Shoulder     Subjective      Erik Bhatia presents to [facility] for initial evaluation of the right shoulder.  Last March she had a fall and started to get better.  Around December 15th picked up 20# bag of bird seed and had more pain then then when she feel.  She couldn't even use a can opener.      Allergies   Allergen Reactions   • Seasonal Ic [Kelp-B6-Lecithin-Vinegar] Cough   • Nitrofurantoin Provider Review Needed        Social History     Socioeconomic History   • Marital status:    Tobacco Use   • Smoking status: Never   • Smokeless tobacco: Never   • Tobacco comments:     none   Vaping Use   • Vaping Use: Never used   Substance and Sexual Activity   • Alcohol use: Yes     Alcohol/week: 3.0 - 4.0 standard drinks     Types: 3 - 4 Glasses of wine per week     Comment: Not every week   • Drug use: Never   • Sexual activity: Not Currently     Partners: Male     Birth control/protection: Pill, I.U.D.     Comment: Birth control previously        Review of Systems     Objective   Vital Signs:   Pulse 76   Ht 152.4 cm (60\")   Wt 72.6 kg (160 lb)   SpO2 97%   BMI 31.25 kg/m²       Physical Exam  Constitutional:       Appearance: Normal appearance. Patient is well-developed and normal weight.   HENT:      Head: Normocephalic.      Right Ear: Hearing and external ear normal.      Left Ear: Hearing and external ear normal.      Nose: Nose normal.   Eyes:      Conjunctiva/sclera: Conjunctivae normal.   Cardiovascular:      Rate and Rhythm: Normal rate.   Pulmonary:      Effort: Pulmonary effort is normal.      Breath sounds: No wheezing or rales.   Abdominal:      Palpations: Abdomen is soft.      Tenderness: There is no abdominal tenderness.   Musculoskeletal:      Cervical back: Normal range of motion.   Skin:     Findings: No rash.   Neurological:      Mental Status: Patient is alert and oriented to person, place, and time. "   Psychiatric:         Mood and Affect: Mood and affect normal.         Judgment: Judgment normal.       Ortho Exam      RIGHT SHOULDER Forward flexion 140. Abduction 100. External rotation 40. Internal rotation back pocket. Positive Cross body adduction. Supraspinatus strength 3+/5. Infraspinatus Strength 3+/5. Infrared subscap 3+/5. Mildly Positive Peres.Mildly Positive Neer.  Positive Lift off.  Sensation intact to light touch, median, radial, ulnar nerve. Positive AIN, PIN, ulnar nerve motor. Positive pulses. Negative Impingement signs. Good strength in triceps, biceps, deltoid, wrist extensors and wrist flexors.       Procedures      Imaging Results (Most Recent)     Procedure Component Value Units Date/Time    XR Scapula Right [[id number]] Resulted: 01/12/23 1043     Updated: 01/12/23 1046           Result Review :     X-Ray Report:  Right scapula X-Ray  Indication: Evaluation of the right scapula  AP/Lateral view(s)  Findings: Mild  osseous abnormality, no dislocation or fracture.   Prior studies available for comparison: No          Assessment and Plan     Diagnoses and all orders for this visit:    1. Right shoulder pain, unspecified chronicity (Primary)  -     XR Scapula Right  -     MRI Shoulder Right Without Contrast; Future        Discussed the treatment plan with the patient. Patient prescribed Mobic and HEP exercises.  She warrants a MRI for possible rotator cuff tear.     Call or return if worsening symptoms.    Follow Up     After MRI    Patient was given instructions and counseling regarding her condition or for health maintenance advice. Please see specific information pulled into the AVS if appropriate.     Scribed for Kareem Guerrero MD by Narda Foster MA.  01/12/23   10:58 EST    I have personally performed the services described in this document as scribed by the above individual and it is both accurate and complete. Kareem Guerrero MD 01/12/23

## 2023-01-26 DIAGNOSIS — F98.8 ATTENTION DEFICIT DISORDER (ADD) WITHOUT HYPERACTIVITY: ICD-10-CM

## 2023-01-26 RX ORDER — LISDEXAMFETAMINE DIMESYLATE 50 MG
50 CAPSULE ORAL EVERY MORNING
Qty: 30 CAPSULE | Refills: 0 | Status: SHIPPED | OUTPATIENT
Start: 2023-01-26 | End: 2023-02-24 | Stop reason: SDUPTHER

## 2023-01-26 RX ORDER — BUDESONIDE AND FORMOTEROL FUMARATE DIHYDRATE 160; 4.5 UG/1; UG/1
2 AEROSOL RESPIRATORY (INHALATION)
Qty: 10.2 G | Refills: 1 | Status: SHIPPED | OUTPATIENT
Start: 2023-01-26

## 2023-01-27 ENCOUNTER — OFFICE VISIT (OUTPATIENT)
Dept: INTERNAL MEDICINE | Facility: CLINIC | Age: 78
End: 2023-01-27
Payer: MEDICARE

## 2023-01-27 VITALS
WEIGHT: 159.8 LBS | HEART RATE: 112 BPM | TEMPERATURE: 96.9 F | HEIGHT: 60 IN | OXYGEN SATURATION: 97 % | BODY MASS INDEX: 31.37 KG/M2 | SYSTOLIC BLOOD PRESSURE: 123 MMHG | DIASTOLIC BLOOD PRESSURE: 60 MMHG

## 2023-01-27 DIAGNOSIS — J20.9 ACUTE BRONCHITIS, UNSPECIFIED ORGANISM: ICD-10-CM

## 2023-01-27 DIAGNOSIS — M25.511 ACUTE PAIN OF RIGHT SHOULDER: Primary | ICD-10-CM

## 2023-01-27 PROCEDURE — 99213 OFFICE O/P EST LOW 20 MIN: CPT | Performed by: INTERNAL MEDICINE

## 2023-01-27 RX ORDER — PREDNISONE 20 MG/1
TABLET ORAL
Qty: 11 TABLET | Refills: 0 | Status: SHIPPED | OUTPATIENT
Start: 2023-01-27 | End: 2023-04-05

## 2023-01-27 NOTE — PROGRESS NOTES
Chief Complaint  Asthma (She states that she has asthma and at night she can hear herself breathing. ) and Cough (Pt states that she has allergies and it is causing her to cough/)    Symone      Erik Bhatia presents to [facility]    Heartburn  She complains of coughing and wheezing. She reports no abdominal pain or no chest pain. Pertinent negatives include no fatigue.   Asthma  She complains of cough and wheezing. There is no shortness of breath. Associated symptoms include postnasal drip. Pertinent negatives include no appetite change, chest pain, ear pain or fever. Her past medical history is significant for asthma.   Cough  Associated symptoms include postnasal drip and wheezing. Pertinent negatives include no chest pain, ear pain, fever or shortness of breath. Her past medical history is significant for asthma.     History of present illness:  78 yo female, former pt of Dr Posey, who has has underlying ADD, RAD, GERD, RLS, among others.  She was seen 8/22 as a New Patient, she is coming in now 12/22 for routine 4-month follow-up.  We will go over her med list, review any recent labs, and make further recommendations at that time.---> Patient being seen 1/23 for urgent issues as per the chief complaint above.    Review of Systems   Constitutional: Negative for appetite change, fatigue and fever.   HENT: Positive for congestion and postnasal drip. Negative for ear pain.    Eyes: Negative for blurred vision.   Respiratory: Positive for cough and wheezing. Negative for chest tightness and shortness of breath.    Cardiovascular: Negative for chest pain, palpitations and leg swelling.   Gastrointestinal: Negative for abdominal pain.   Genitourinary: Negative for difficulty urinating, dysuria and hematuria.   Musculoskeletal: Negative for arthralgias and gait problem.   Skin: Negative for skin lesions.   Neurological: Negative for syncope, memory problem and confusion.  "  Psychiatric/Behavioral: Negative for self-injury and depressed mood.       Objective   Vital Signs:   /60   Pulse 112   Temp 96.9 °F (36.1 °C) (Skin)   Ht 152.4 cm (60\")   Wt 72.5 kg (159 lb 12.8 oz)   SpO2 97%   BMI 31.21 kg/m²           Physical Exam  Vitals and nursing note reviewed.   Constitutional:       General: She is not in acute distress.     Appearance: Normal appearance. She is not toxic-appearing.   HENT:      Head: Atraumatic.      Right Ear: External ear normal.      Left Ear: External ear normal.      Nose: Nose normal.      Mouth/Throat:      Mouth: Mucous membranes are moist.   Eyes:      General:         Right eye: No discharge.         Left eye: No discharge.      Extraocular Movements: Extraocular movements intact.      Pupils: Pupils are equal, round, and reactive to light.   Cardiovascular:      Rate and Rhythm: Regular rhythm. Tachycardia present.      Pulses: Normal pulses.      Heart sounds: Normal heart sounds. No murmur heard.    No gallop.      Comments: Little tachycardic, but no ectopy.  Pulmonary:      Effort: Pulmonary effort is normal. No respiratory distress.      Breath sounds: No wheezing, rhonchi or rales.      Comments: Patient with scattered rhonchi, no labored respiration.  Abdominal:      General: There is no distension.      Palpations: Abdomen is soft. There is no mass.      Tenderness: There is no abdominal tenderness. There is no guarding.   Musculoskeletal:         General: No swelling or tenderness.      Cervical back: No tenderness.      Right lower leg: No edema.      Left lower leg: No edema.      Comments: No edema.   Skin:     General: Skin is warm and dry.      Findings: No rash.   Neurological:      General: No focal deficit present.      Mental Status: She is alert and oriented to person, place, and time. Mental status is at baseline.      Motor: No weakness.      Gait: Gait normal.   Psychiatric:         Mood and Affect: Mood normal.         " Thought Content: Thought content normal.          Result Review   The following data was reviewed by: Marco Antonio Leonard MD on 08/10/2022:  [x] Laboratory  [] Microbiology  [] Radiology  [] EKG/telemetry  [] Cardiology/Vascular  [] Pathology  [x] Old records             Assessment and Plan   Diagnoses and all orders for this visit:    1. Acute pain of right shoulder (Primary)  Assessment & Plan:  Patient clued me and on what is going on with this and her evaluation with Dr. Guerrero at her 1/23 urgent visit for the cough.  He prescribed meloxicam, we discussed utilizing over-the-counter nonsteroidals first, and then going to meloxicam if she has persistent pain.  She is scheduled for an MRI soon and has follow-up with him.      2. Acute bronchitis, unspecified organism  Assessment & Plan:  This is indication for the patient's urgent visit 1/23.  She has underlying asthma and is compliant with twice daily Symbicort along with Singulair.  Also note that patient is on scheduled twice daily Zyrtec-D.  Her sats are fine at 97% on room air, there is no labored respiration, she is speaking full sentences.  Her exam is without any obvious consolidation, no fevers etc.  We will treat her per orders with a short course of corticosteroids, she has Tessalon to utilize for the cough.  Patient to call early next week if symptoms not improving.      Other orders  -     predniSONE (DELTASONE) 20 MG tablet; Take 1 pill twice daily for 3 days, 1 pill daily for 3 days, then half tablet daily for 4 days.  Dispense: 11 tablet; Refill: 0        Follow Up   Return for Next scheduled follow up.  Patient was given instructions and counseling regarding her condition or for health maintenance advice. Please see specific information pulled into the AVS if appropriate.

## 2023-01-27 NOTE — ASSESSMENT & PLAN NOTE
This is indication for the patient's urgent visit 1/23.  She has underlying asthma and is compliant with twice daily Symbicort along with Singulair.  Also note that patient is on scheduled twice daily Zyrtec-D.  Her sats are fine at 97% on room air, there is no labored respiration, she is speaking full sentences.  Her exam is without any obvious consolidation, no fevers etc.  We will treat her per orders with a short course of corticosteroids, she has Tessalon to utilize for the cough.  Patient to call early next week if symptoms not improving.

## 2023-02-06 ENCOUNTER — OFFICE VISIT (OUTPATIENT)
Dept: PODIATRY | Facility: CLINIC | Age: 78
End: 2023-02-06
Payer: MEDICARE

## 2023-02-06 VITALS
BODY MASS INDEX: 31.44 KG/M2 | OXYGEN SATURATION: 100 % | DIASTOLIC BLOOD PRESSURE: 60 MMHG | TEMPERATURE: 98.9 F | SYSTOLIC BLOOD PRESSURE: 138 MMHG | WEIGHT: 161 LBS | HEART RATE: 90 BPM

## 2023-02-06 DIAGNOSIS — L89.891 PRESSURE INJURY OF RIGHT FOOT, STAGE 1: ICD-10-CM

## 2023-02-06 DIAGNOSIS — M79.671 FOOT PAIN, BILATERAL: ICD-10-CM

## 2023-02-06 DIAGNOSIS — M20.42 HAMMER TOES OF BOTH FEET: ICD-10-CM

## 2023-02-06 DIAGNOSIS — M20.11 VALGUS DEFORMITY OF BOTH GREAT TOES: Primary | ICD-10-CM

## 2023-02-06 DIAGNOSIS — L89.891 PRESSURE INJURY OF TOE OF RIGHT FOOT, STAGE 1: ICD-10-CM

## 2023-02-06 DIAGNOSIS — B35.1 ONYCHOMYCOSIS: ICD-10-CM

## 2023-02-06 DIAGNOSIS — M20.12 VALGUS DEFORMITY OF BOTH GREAT TOES: Primary | ICD-10-CM

## 2023-02-06 DIAGNOSIS — M79.672 FOOT PAIN, BILATERAL: ICD-10-CM

## 2023-02-06 DIAGNOSIS — M20.41 HAMMER TOES OF BOTH FEET: ICD-10-CM

## 2023-02-06 DIAGNOSIS — L60.0 ONYCHOCRYPTOSIS: ICD-10-CM

## 2023-02-06 PROCEDURE — 11721 DEBRIDE NAIL 6 OR MORE: CPT | Performed by: PODIATRIST

## 2023-02-06 PROCEDURE — 97597 DBRDMT OPN WND 1ST 20 CM/<: CPT | Performed by: PODIATRIST

## 2023-02-06 NOTE — PROGRESS NOTES
[facility] [place] - PODIATRY    Today's Date: 02/06/23    Patient Name: Erik Bhatia  MRN: [id number]  CSN: [id number]  PCP: Marco Antonio Leonard MD, last PCP visit: 1/27/2023  Referring Provider: No ref. provider found    SUBJECTIVE     Chief Complaint   Patient presents with   • Left Foot - Follow-up, Nail Problem, Callouses   • Right Foot - Follow-up, Nail Problem, Callouses     HPI: Erik Bhatia, a 77 y.o.female, comes presents to clinic with chief complaint of:    New, Established, New Problem: Established    Location:  hyperkeratotic lesion(s) on dorsal right second proximal interphalangeal joint    Duration: 2015    Onset:  gradual    Nature:  sore    Stable, worsening, improving: Stable, recurring    Aggravating factors:  Patient reports lesion(s) is painful with shoegear and ambulation.      Previous Treatment:   Debridement  ---------------------------  New, Established, New Problem: est    Location: Toenails    Duration:  Greater than five years    Onset: Gradual    Nature: sore with palpation.    Stable, worsening, improving: stable    Aggravating factors: Pain with shoe gear and ambulation.    Previous Treatment:  Debridement    Medical changes:  none.    Patient denies any fevers, chills, nausea, vomiting, shortness of breathe, nor any other constitutional signs nor symptoms.      Past Medical History:   Diagnosis Date   • ADHD (attention deficit hyperactivity disorder) about 2000   • Allergic ?   • Anemia ?   • Arthritis    • Asthma    • Bladder disorder    • Bunion    • Chronic allergic rhinitis    • Corns and callus    • GERD (gastroesophageal reflux disease)    • Hammertoe    • Ingrown toenail    • Limb pain    • Limb swelling    • Numbness in feet    • SOB (shortness of breath)      Past Surgical History:   Procedure Laterality Date   • BREAST BIOPSY     • CARPAL TUNNEL RELEASE     • COLONOSCOPY  2020 and before   • TRIGGER FINGER RELEASE       Family History   Problem Relation Age of  Onset   • Stomach cancer Father    • Diabetes Father    • Asthma Father    • Cancer Father         stomach   • Diabetes Maternal Grandfather    • Heart disease Mother      Social History     Socioeconomic History   • Marital status:    Tobacco Use   • Smoking status: Never   • Smokeless tobacco: Never   • Tobacco comments:     none   Vaping Use   • Vaping Use: Never used   Substance and Sexual Activity   • Alcohol use: Yes     Alcohol/week: 3.0 - 4.0 standard drinks     Types: 3 - 4 Glasses of wine per week     Comment: Not every week   • Drug use: Never   • Sexual activity: Not Currently     Partners: Male     Birth control/protection: Pill, I.U.D.     Comment: Birth control previously     Allergies   Allergen Reactions   • Seasonal Ic [Kelp-B6-Lecithin-Vinegar] Cough   • Nitrofurantoin Provider Review Needed     Current Outpatient Medications   Medication Sig Dispense Refill   • aluminum hydroxide-magnesium carbonate (Gaviscon)  MG/15ML suspension oral suspension 1 mL.     • budesonide-formoterol (SYMBICORT) 160-4.5 MCG/ACT inhaler Inhale 2 puffs 2 (Two) Times a Day. 10.2 g 1   • cetirizine-pseudoephedrine (ZyrTEC-D Allergy & Congestion) 5-120 MG per 12 hr tablet Take 1 tablet by mouth 2 (Two) Times a Day. 180 tablet 1   • chlorhexidine (PERIDEX) 0.12 % solution      • Cholecalciferol 125 MCG (5000 UT) tablet 1 tablet Daily.     • Diclofenac Sodium (VOLTAREN) 1 % gel gel Apply 4 g topically to the appropriate area as directed 4 (Four) Times a Day As Needed (right shoulder pain). 150 g 1   • estradiol (ESTRACE) 0.1 MG/GM vaginal cream Insert 1 g into the vagina 2 (Two) Times a Week. 42.5 g 3   • fluticasone (Flonase) 50 MCG/ACT nasal spray 2 sprays into the nostril(s) as directed by provider Daily. 45 mL 3   • meloxicam (Mobic) 15 MG tablet Take 1 tablet by mouth Daily. 30 tablet 0   • montelukast (SINGULAIR) 10 MG tablet TAKE 1 TABLET DAILY 90 tablet 3   • olopatadine (PATADAY) 0.2 % solution  ophthalmic solution 1 drop.     • omeprazole (priLOSEC) 20 MG capsule TAKE 1 CAPSULE DAILY 90 capsule 3   • potassium chloride (MICRO-K) 10 MEQ CR capsule TAKE 2 CAPSULES DAILY. 180 capsule 3   • predniSONE (DELTASONE) 20 MG tablet Take 1 pill twice daily for 3 days, 1 pill daily for 3 days, then half tablet daily for 4 days. 11 tablet 0   • silver sulfadiazine (Silvadene) 1 % cream Apply 1 application topically to the appropriate area as directed 2 (Two) Times a Day. 1 each 5   • triamcinolone (KENALOG) 0.1 % ointment      • Vyvanse 50 MG capsule Take 1 capsule by mouth Every Morning 30 capsule 0     Current Facility-Administered Medications   Medication Dose Route Frequency Provider Last Rate Last Admin   • ferric carboxymaltose (INJECTAFER) injection solution 750 mg  750 mg Intravenous Once Alda Borges MD         Review of Systems   Constitutional: Negative.    Skin:        Painful hyperkeratotic lesion and toenails   All other systems reviewed and are negative.      OBJECTIVE     Vitals:    02/06/23 1518   BP: 138/60   Pulse: 90   Temp: 98.9 °F (37.2 °C)   SpO2: 100%       Body mass index is 31.44 kg/m².    Lab Results   Component Value Date    GLUCOSE 89 12/02/2022    CALCIUM 9.7 12/02/2022     12/02/2022    K 3.5 12/02/2022    CO2 26.3 12/02/2022     12/02/2022    BUN 20 12/02/2022    CREATININE 0.72 12/02/2022    EGFRIFNONA 79 09/10/2021    BCR 27.8 (H) 12/02/2022    ANIONGAP 12.7 12/02/2022       Patient seen in no apparent distress.      PHYSICAL EXAM:     Foot/Ankle Exam:       General:   Appearance: elderly    Orientation: AAOx3    Affect: appropriate    Gait: unimpaired    Shoe Gear:  Casual shoes    VASCULAR      Right Foot Vascularity   Normal vascular exam    Dorsalis pedis:  2+  Posterior tibial:  2+  Skin Temperature: warm    Edema Grading:  None  CFT:  < 3 seconds  Pedal Hair Growth:  Present  Varicosities: none       Left Foot Vascularity   Normal vascular exam    Dorsalis pedis:   2+  Posterior tibial:  2+  Skin Temperature: warm    Edema Grading:  None  CFT:  < 3 seconds  Pedal Hair Growth:  Present  Varicosities: none        NEUROLOGIC     Right Foot Neurologic   Normal sensation    Light touch sensation:  Normal  Vibratory sensation:  Normal  Hot/Cold sensation: normal    Protective Sensation using Josephine-Ayush Monofilament:  10     Left Foot Neurologic   Normal sensation    Light touch sensation:  Normal  Vibratory sensation:  Normal  Hot/cold sensation: normal    Protective Sensation using Josephine-Ayush Monofilament:  10     MUSCULOSKELETAL      Right Foot Musculoskeletal   Hammertoe:  Second toe, third toe, fourth toe and fifth toe  Hallux valgus: Yes       Left Foot Musculoskeletal   Hammertoe:  Second toe, third toe, fourth toe and fifth toe  Hallux valgus: Yes       MUSCLE STRENGTH     Right Foot Muscle Strength   Foot dorsiflexion:  4  Foot plantar flexion:  4  Foot inversion:  4  Foot eversion:  4     Left Foot Muscle Strength   Foot dorsiflexion:  4  Foot plantar flexion:  4  Foot inversion:  4  Foot eversion:  4     RANGE OF MOTION      Right Foot Range of Motion   Foot and ankle ROM within normal limits       Left Foot Range of Motion   Foot and ankle ROM within normal limits       DERMATOLOGIC     Right Foot Dermatologic   Skin: ulcer    Nails: onychomycosis, abnormally thick, subungual debris, dystrophic nails and ingrown toenail    Nails comment:  Toenails 1, 2, 3, 4, and 5     Left Foot Dermatologic   Skin: skin intact    Nails: onychomycosis, abnormally thick, subungual debris, dystrophic nails and ingrown toenail    Nails comment:  Toenails 1, 2, 3, 4, and 5      Right Foot Additional Comments Stage 1 ulceration on the dorsal right second PIP joint area of the foot.  Hyperkeratotic tissue with subepidermal hemosiderin deposition is present.  No surrounding, edema, erythema, lymphangitis, fluctuance, nor signs of infection.  No drainage present.  10 mm x 9 mm x 3 mm.   This area was debrided via excisional partial thickness debridement with a 15 scalpel blade.  Post debridement measurements were 9 mm x 8 mm x 1 mm in depth.        ASSESSMENT/PLAN     Diagnoses and all orders for this visit:    1. 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 Onychomycosis    3. Foot pain, bilateral    4. Hammer toes of both feet    5. Onychocryptosis    6. Pressure injury of toe of right foot, stage 1    7. Pressure injury of right foot, stage 1        Comprehensive lower extremity examination and evaluation was performed.    Discussed findings and treatment plan including risks, benefits, and treatment options with patient in detail. Patient agreed with treatment plan.    Toenails 1 through 5 bilaterally were debrided in thickness and length with toenail tremors.  These were then smoothed with a dermal sanding bur.  Patient tolerated the procedure well.    An After Visit Summary was printed and given to the patient at discharge, including (if requested) any available informative/educational handouts regarding diagnosis, treatment, or medications. All questions were answered to patient/family satisfaction. Should symptoms fail to improve or worsen they agree to call or return to clinic or to go to the Emergency Department. Discussed the importance of following up with any needed screening tests/labs/specialist appointments and any requested follow-up recommended by me today. Importance of maintaining follow-up discussed and patient accepts that missed appointments can delay diagnosis and potentially lead to worsening of conditions.    Return in about 9 weeks (around 4/10/2023) for Toenail Care, Ulcer Follow-Up., or sooner if acute issues arise.    This document has been electronically signed by Rian Jo DPM on February 6, 2023 15:37 EST

## 2023-02-08 ENCOUNTER — HOSPITAL ENCOUNTER (OUTPATIENT)
Dept: MRI IMAGING | Facility: HOSPITAL | Age: 78
Discharge: HOME | End: 2023-02-08
Admitting: ORTHOPAEDIC SURGERY
Payer: MEDICARE

## 2023-02-08 DIAGNOSIS — M25.511 RIGHT SHOULDER PAIN, UNSPECIFIED CHRONICITY: ICD-10-CM

## 2023-02-08 PROCEDURE — 73221 MRI JOINT UPR EXTREM W/O DYE: CPT

## 2023-02-24 DIAGNOSIS — F98.8 ATTENTION DEFICIT DISORDER (ADD) WITHOUT HYPERACTIVITY: ICD-10-CM

## 2023-02-24 DIAGNOSIS — N95.2 VAGINAL ATROPHY: ICD-10-CM

## 2023-02-24 RX ORDER — LISDEXAMFETAMINE DIMESYLATE 50 MG
50 CAPSULE ORAL EVERY MORNING
Qty: 30 CAPSULE | Refills: 0 | Status: SHIPPED | OUTPATIENT
Start: 2023-02-24 | End: 2023-04-05

## 2023-02-24 RX ORDER — ESTRADIOL 0.1 MG/G
1 CREAM VAGINAL 2 TIMES WEEKLY
Qty: 42.5 G | Refills: 3 | Status: SHIPPED | OUTPATIENT
Start: 2023-02-27 | End: 2023-02-24 | Stop reason: SDUPTHER

## 2023-02-24 RX ORDER — ESTRADIOL 0.1 MG/G
1 CREAM VAGINAL 2 TIMES WEEKLY
Qty: 42.5 G | Refills: 3 | Status: SHIPPED | OUTPATIENT
Start: 2023-02-27 | End: 2024-02-27

## 2023-03-29 DIAGNOSIS — F98.8 ATTENTION DEFICIT DISORDER (ADD) WITHOUT HYPERACTIVITY: Primary | ICD-10-CM

## 2023-04-04 ENCOUNTER — LAB (OUTPATIENT)
Dept: LAB | Facility: HOSPITAL | Age: 78
End: 2023-04-04
Payer: MEDICARE

## 2023-04-04 DIAGNOSIS — E53.8 VITAMIN B12 DEFICIENCY: ICD-10-CM

## 2023-04-04 DIAGNOSIS — E55.9 VITAMIN D DEFICIENCY: ICD-10-CM

## 2023-04-04 DIAGNOSIS — E78.2 MIXED HYPERLIPIDEMIA: ICD-10-CM

## 2023-04-04 DIAGNOSIS — D50.8 IRON DEFICIENCY ANEMIA DUE TO DIETARY CAUSES: ICD-10-CM

## 2023-04-04 DIAGNOSIS — E87.6 HYPOKALEMIA: ICD-10-CM

## 2023-04-04 LAB
25(OH)D3 SERPL-MCNC: 25.7 NG/ML (ref 30–100)
ALBUMIN SERPL-MCNC: 4.2 G/DL (ref 3.5–5.2)
ALBUMIN/GLOB SERPL: 1.4 G/DL
ALP SERPL-CCNC: 97 U/L (ref 39–117)
ALT SERPL W P-5'-P-CCNC: 18 U/L (ref 1–33)
ANION GAP SERPL CALCULATED.3IONS-SCNC: 10.4 MMOL/L (ref 5–15)
AST SERPL-CCNC: 20 U/L (ref 1–32)
BASOPHILS # BLD AUTO: 0.08 10*3/MM3 (ref 0–0.2)
BASOPHILS NFR BLD AUTO: 1.2 % (ref 0–1.5)
BILIRUB SERPL-MCNC: 0.4 MG/DL (ref 0–1.2)
BUN SERPL-MCNC: 19 MG/DL (ref 8–23)
BUN/CREAT SERPL: 27.1 (ref 7–25)
CALCIUM SPEC-SCNC: 10.1 MG/DL (ref 8.6–10.5)
CHLORIDE SERPL-SCNC: 102 MMOL/L (ref 98–107)
CHOLEST SERPL-MCNC: 195 MG/DL (ref 0–200)
CO2 SERPL-SCNC: 24.6 MMOL/L (ref 22–29)
CREAT SERPL-MCNC: 0.7 MG/DL (ref 0.57–1)
DEPRECATED RDW RBC AUTO: 38.1 FL (ref 37–54)
EGFRCR SERPLBLD CKD-EPI 2021: 88.7 ML/MIN/1.73
EOSINOPHIL # BLD AUTO: 0.4 10*3/MM3 (ref 0–0.4)
EOSINOPHIL NFR BLD AUTO: 5.9 % (ref 0.3–6.2)
ERYTHROCYTE [DISTWIDTH] IN BLOOD BY AUTOMATED COUNT: 11.7 % (ref 12.3–15.4)
FERRITIN SERPL-MCNC: 162 NG/ML (ref 13–150)
FOLATE SERPL-MCNC: 14.4 NG/ML (ref 4.78–24.2)
GLOBULIN UR ELPH-MCNC: 2.9 GM/DL
GLUCOSE SERPL-MCNC: 93 MG/DL (ref 65–99)
HCT VFR BLD AUTO: 37.9 % (ref 34–46.6)
HDLC SERPL-MCNC: 66 MG/DL (ref 40–60)
HGB BLD-MCNC: 12.5 G/DL (ref 12–15.9)
IMM GRANULOCYTES # BLD AUTO: 0.02 10*3/MM3 (ref 0–0.05)
IMM GRANULOCYTES NFR BLD AUTO: 0.3 % (ref 0–0.5)
IRON 24H UR-MRATE: 87 MCG/DL (ref 37–145)
IRON SATN MFR SERPL: 23 % (ref 20–50)
LDLC SERPL CALC-MCNC: 116 MG/DL (ref 0–100)
LDLC/HDLC SERPL: 1.74 {RATIO}
LYMPHOCYTES # BLD AUTO: 1.84 10*3/MM3 (ref 0.7–3.1)
LYMPHOCYTES NFR BLD AUTO: 26.9 % (ref 19.6–45.3)
MAGNESIUM SERPL-MCNC: 2.1 MG/DL (ref 1.6–2.4)
MCH RBC QN AUTO: 28.9 PG (ref 26.6–33)
MCHC RBC AUTO-ENTMCNC: 33 G/DL (ref 31.5–35.7)
MCV RBC AUTO: 87.7 FL (ref 79–97)
MONOCYTES # BLD AUTO: 0.69 10*3/MM3 (ref 0.1–0.9)
MONOCYTES NFR BLD AUTO: 10.1 % (ref 5–12)
NEUTROPHILS NFR BLD AUTO: 3.8 10*3/MM3 (ref 1.7–7)
NEUTROPHILS NFR BLD AUTO: 55.6 % (ref 42.7–76)
NRBC BLD AUTO-RTO: 0 /100 WBC (ref 0–0.2)
PLATELET # BLD AUTO: 250 10*3/MM3 (ref 140–450)
PMV BLD AUTO: 11.1 FL (ref 6–12)
POTASSIUM SERPL-SCNC: 3.7 MMOL/L (ref 3.5–5.2)
PROT SERPL-MCNC: 7.1 G/DL (ref 6–8.5)
RBC # BLD AUTO: 4.32 10*6/MM3 (ref 3.77–5.28)
SODIUM SERPL-SCNC: 137 MMOL/L (ref 136–145)
TIBC SERPL-MCNC: 384 MCG/DL (ref 298–536)
TRANSFERRIN SERPL-MCNC: 258 MG/DL (ref 200–360)
TRIGL SERPL-MCNC: 72 MG/DL (ref 0–150)
VIT B12 BLD-MCNC: 622 PG/ML (ref 211–946)
VLDLC SERPL-MCNC: 13 MG/DL (ref 5–40)
WBC NRBC COR # BLD: 6.83 10*3/MM3 (ref 3.4–10.8)

## 2023-04-04 PROCEDURE — 80061 LIPID PANEL: CPT

## 2023-04-04 PROCEDURE — 83540 ASSAY OF IRON: CPT

## 2023-04-04 PROCEDURE — 82306 VITAMIN D 25 HYDROXY: CPT

## 2023-04-04 PROCEDURE — 82728 ASSAY OF FERRITIN: CPT

## 2023-04-04 PROCEDURE — 36415 COLL VENOUS BLD VENIPUNCTURE: CPT

## 2023-04-04 PROCEDURE — 84466 ASSAY OF TRANSFERRIN: CPT

## 2023-04-04 PROCEDURE — 82607 VITAMIN B-12: CPT

## 2023-04-04 PROCEDURE — 82746 ASSAY OF FOLIC ACID SERUM: CPT

## 2023-04-04 PROCEDURE — 83735 ASSAY OF MAGNESIUM: CPT

## 2023-04-04 PROCEDURE — 80053 COMPREHEN METABOLIC PANEL: CPT

## 2023-04-04 PROCEDURE — 85025 COMPLETE CBC W/AUTO DIFF WBC: CPT

## 2023-04-05 ENCOUNTER — OFFICE VISIT (OUTPATIENT)
Dept: INTERNAL MEDICINE | Facility: CLINIC | Age: 78
End: 2023-04-05
Payer: MEDICARE

## 2023-04-05 VITALS
OXYGEN SATURATION: 96 % | HEIGHT: 60 IN | WEIGHT: 156 LBS | DIASTOLIC BLOOD PRESSURE: 64 MMHG | HEART RATE: 91 BPM | SYSTOLIC BLOOD PRESSURE: 120 MMHG | BODY MASS INDEX: 30.63 KG/M2 | TEMPERATURE: 99.3 F

## 2023-04-05 DIAGNOSIS — M25.511 ACUTE PAIN OF RIGHT SHOULDER: ICD-10-CM

## 2023-04-05 DIAGNOSIS — J45.40 MODERATE PERSISTENT ASTHMA WITHOUT COMPLICATION: ICD-10-CM

## 2023-04-05 DIAGNOSIS — E78.2 MIXED HYPERLIPIDEMIA: ICD-10-CM

## 2023-04-05 DIAGNOSIS — F98.8 ATTENTION DEFICIT DISORDER (ADD) WITHOUT HYPERACTIVITY: ICD-10-CM

## 2023-04-05 DIAGNOSIS — Z00.00 MEDICARE ANNUAL WELLNESS VISIT, SUBSEQUENT: Primary | ICD-10-CM

## 2023-04-05 DIAGNOSIS — D50.8 IRON DEFICIENCY ANEMIA DUE TO DIETARY CAUSES: ICD-10-CM

## 2023-04-05 DIAGNOSIS — E55.9 VITAMIN D DEFICIENCY: ICD-10-CM

## 2023-04-05 DIAGNOSIS — R30.0 BURNING WITH URINATION: ICD-10-CM

## 2023-04-05 LAB
BILIRUB BLD-MCNC: ABNORMAL MG/DL
CLARITY, POC: CLEAR
COLOR UR: YELLOW
GLUCOSE UR STRIP-MCNC: NEGATIVE MG/DL
KETONES UR QL: NEGATIVE
LEUKOCYTE EST, POC: NEGATIVE
NITRITE UR-MCNC: POSITIVE MG/ML
PH UR: 6 [PH] (ref 5–8)
PROT UR STRIP-MCNC: NEGATIVE MG/DL
RBC # UR STRIP: NEGATIVE /UL
SP GR UR: 1.02 (ref 1–1.03)
UROBILINOGEN UR QL: NORMAL

## 2023-04-05 NOTE — PROGRESS NOTES
"Chief Complaint  Allergic Rhinitis (Has a cough that she can't get rid of ), Follow-up (Pt states that this is routine, she had labs.), and Urinary Tract Infection (Pt has burning with urination)    Subjective      Kathleenjavi Bhatia presents to [facility]    History of present illness:  78 yo female, former pt of Dr Posey, who has has underlying ADD, RAD, GERD, RLS, among others.  She was seen 8/22 as a New Patient, and she is coming in now 4/23 for routine 4-month follow-up.  We will go over her med list, review any recent labs, and make further recommendations at that time.    Review of Systems   Constitutional: Negative for appetite change, fatigue and fever.   HENT: Negative for congestion and ear pain.    Eyes: Negative for blurred vision.   Respiratory: Negative for cough, chest tightness, shortness of breath and wheezing.    Cardiovascular: Negative for chest pain, palpitations and leg swelling.   Gastrointestinal: Negative for abdominal pain.   Genitourinary: Negative for difficulty urinating, dysuria and hematuria.   Musculoskeletal: Negative for arthralgias and gait problem.   Skin: Negative for skin lesions.   Neurological: Negative for syncope, memory problem and confusion.   Psychiatric/Behavioral: Negative for self-injury and depressed mood.       Objective   Vital Signs:   /64   Pulse 91   Temp 99.3 °F (37.4 °C) (Skin)   Ht 152.4 cm (60\")   Wt 70.8 kg (156 lb)   SpO2 96%   BMI 30.47 kg/m²           Physical Exam  Vitals and nursing note reviewed.   Constitutional:       General: She is not in acute distress.     Appearance: Normal appearance. She is not toxic-appearing.   HENT:      Head: Atraumatic.      Right Ear: External ear normal.      Left Ear: External ear normal.      Nose: Nose normal.      Mouth/Throat:      Mouth: Mucous membranes are moist.   Eyes:      General:         Right eye: No discharge.         Left eye: No discharge.      " Extraocular Movements: Extraocular movements intact.      Pupils: Pupils are equal, round, and reactive to light.   Cardiovascular:      Rate and Rhythm: Regular rhythm. Tachycardia present.      Pulses: Normal pulses.      Heart sounds: Normal heart sounds. No murmur heard.    No gallop.      Comments: Little tachycardic, but no ectopy.  Pulmonary:      Effort: Pulmonary effort is normal. No respiratory distress.      Breath sounds: No wheezing, rhonchi or rales.      Comments: Patient with scattered rhonchi, no labored respiration.  Abdominal:      General: There is no distension.      Palpations: Abdomen is soft. There is no mass.      Tenderness: There is no abdominal tenderness. There is no guarding.   Musculoskeletal:         General: No swelling or tenderness.      Cervical back: No tenderness.      Right lower leg: No edema.      Left lower leg: No edema.      Comments: No edema.   Skin:     General: Skin is warm and dry.      Findings: No rash.   Neurological:      General: No focal deficit present.      Mental Status: She is alert and oriented to person, place, and time. Mental status is at baseline.      Motor: No weakness.      Gait: Gait normal.   Psychiatric:         Mood and Affect: Mood normal.         Thought Content: Thought content normal.          Result Review   The following data was reviewed by: Marco Antonio Leonard MD on 08/10/2022:  [x] Laboratory  [] Microbiology  [] Radiology  [] EKG/telemetry  [] Cardiology/Vascular  [] Pathology  [x] Old records             Assessment and Plan   Diagnoses and all orders for this visit:    1. Medicare annual wellness visit, subsequent (Primary)  Assessment & Plan:  AWV completed 4/23.  Patient remains very alert and independent.  She is more active helping take care of her little puppy.  She has advance care directive on file already at the hospital.    Orders:  -     TSH; Future    2. Moderate persistent asthma without complication  Assessment & Plan:  Patient  stable thus far this spring as of her 4/23 office visit.  She is on twice daily Symbicort chronically along with Singulair and Zyrtec-D.  She utilizes Pataday eyedrops as well as Flonase nasal spray as needed.  Continue same plan of care as written.      3. Attention deficit disorder (ADD) without hyperactivity  Assessment & Plan:  Patient continues to tolerate Vyvanse without any side effects, no palpitations no hypertension, so stable to continue current dosing.      4. Mixed hyperlipidemia  Assessment & Plan:  LDL is stable at 116 as of 4/23 office visit.  Her HDL remains elevated and this is protective.  Patient appears to be rather low risk for CVA CAD, so we will continue to defer treatment.    Orders:  -     Comprehensive Metabolic Panel; Future  -     Lipid Panel; Future    5. Burning with urination  -     POCT urinalysis dipstick, manual    6. Acute pain of right shoulder  Overview:  MRI 2/23:                 1. Full thickness, full width tear of the supraspinatus tendon with tendon retraction and moderate muscle atrophy.  2. Tendinopathy and suspected partial thickness tears of the supraspinatus and infraspinatus tendons.  3. Tear and inferior retraction of the long head of the biceps tendon.  4. Mild glenohumeral osteoarthritis with moderate joint effusion and possible synovitis.  5. Advanced degenerative changes at the acromioclavicular joint with fluid in the joint space which may be seen with chronic rotator cuff tear.    Assessment & Plan:  Patient had MRI as noted above, this was reviewed at her 4/23 office visit.  Sounds like she missed a call in regards to possible follow-up with Dr. Guerrero, but as of this office visit, patient is not having any discomfort in the arm and she has very reasonable range of movement.  I think we can defer referral back at this time.      7. Vitamin D deficiency  Assessment & Plan:  Patient's vitamin D level is below 30 as of 4/23, she has been missing some doses  inadvertently, should get back on it daily here recently, will repeat level on return to office.    Orders:  -     Vitamin D,25-Hydroxy; Future    8. Iron deficiency anemia due to dietary causes  Assessment & Plan:  Hemoglobin is up slightly from 12.0 12.5 as of her/23 office visit.  She has maintained off of iron supplementation, her ferritin is still slightly elevated at 160, but it is slowly trending down.  It bumped up from 20 to 200 after IV iron in 2022.    Orders:  -     CBC & Differential; Future  -     Ferritin; Future  -     Iron Profile; Future        Follow Up   Return in about 6 months (around 10/5/2023).  Patient was given instructions and counseling regarding her condition or for health maintenance advice. Please see specific information pulled into the AVS if appropriate.

## 2023-04-05 NOTE — ASSESSMENT & PLAN NOTE
Hemoglobin is up slightly from 12.0 12.5 as of her/23 office visit.  She has maintained off of iron supplementation, her ferritin is still slightly elevated at 160, but it is slowly trending down.  It bumped up from 20 to 200 after IV iron in 2022.

## 2023-04-05 NOTE — ASSESSMENT & PLAN NOTE
ELE completed 4/23.  Patient remains very alert and independent.  She is more active helping take care of her little puppy.  She has advance care directive on file already at the hospital.

## 2023-04-05 NOTE — ASSESSMENT & PLAN NOTE
Patient's vitamin D level is below 30 as of 4/23, she has been missing some doses inadvertently, should get back on it daily here recently, will repeat level on return to office.

## 2023-04-05 NOTE — ASSESSMENT & PLAN NOTE
Patient continues to tolerate Vyvanse without any side effects, no palpitations no hypertension, so stable to continue current dosing.

## 2023-04-05 NOTE — ASSESSMENT & PLAN NOTE
Patient stable thus far this spring as of her 4/23 office visit.  She is on twice daily Symbicort chronically along with Singulair and Zyrtec-D.  She utilizes Pataday eyedrops as well as Flonase nasal spray as needed.  Continue same plan of care as written.

## 2023-04-05 NOTE — ASSESSMENT & PLAN NOTE
LDL is stable at 116 as of 4/23 office visit.  Her HDL remains elevated and this is protective.  Patient appears to be rather low risk for CVA CAD, so we will continue to defer treatment.

## 2023-04-05 NOTE — PROGRESS NOTES
The ABCs of the Annual Wellness Visit  Subsequent Medicare Wellness Visit    Symone Bhatia is a 78 y.o. female who presents for a Subsequent Medicare Wellness Visit.    The following portions of the patient's history were reviewed and   updated as appropriate: allergies, current medications, past family history, past medical history, past social history, past surgical history and problem list.    Compared to one year ago, the patient feels her physical   health is better.    Compared to one year ago, the patient feels her mental   health is better.    Recent Hospitalizations:  She was not admitted to the hospital during the last year.       Current Medical Providers:  Patient Care Team:  Marco Antonio Leonard MD as PCP - General (Internal Medicine)    Outpatient Medications Prior to Visit   Medication Sig Dispense Refill   • aluminum hydroxide-magnesium carbonate (Gaviscon)  MG/15ML suspension oral suspension 1 mL.     • budesonide-formoterol (SYMBICORT) 160-4.5 MCG/ACT inhaler Inhale 2 puffs 2 (Two) Times a Day. 10.2 g 1   • cetirizine-pseudoephedrine (ZyrTEC-D Allergy & Congestion) 5-120 MG per 12 hr tablet Take 1 tablet by mouth 2 (Two) Times a Day. 180 tablet 1   • chlorhexidine (PERIDEX) 0.12 % solution      • Cholecalciferol 125 MCG (5000 UT) tablet 1 tablet Daily.     • Diclofenac Sodium (VOLTAREN) 1 % gel gel Apply 4 g topically to the appropriate area as directed 4 (Four) Times a Day As Needed (right shoulder pain). 150 g 1   • estradiol (ESTRACE) 0.1 MG/GM vaginal cream Insert 1 g into the vagina 2 (Two) Times a Week. 42.5 g 3   • fluticasone (Flonase) 50 MCG/ACT nasal spray 2 sprays into the nostril(s) as directed by provider Daily. 45 mL 3   • [START ON 4/26/2023] lisdexamfetamine (Vyvanse) 50 MG capsule Take 1 capsule by mouth Every Morning for 30 days 30 capsule 0   • montelukast (SINGULAIR) 10 MG tablet TAKE 1 TABLET DAILY 90 tablet 3   • olopatadine (PATADAY) 0.2 % solution  ophthalmic solution 1 drop.     • omeprazole (priLOSEC) 20 MG capsule TAKE 1 CAPSULE DAILY 90 capsule 3   • potassium chloride (MICRO-K) 10 MEQ CR capsule TAKE 2 CAPSULES DAILY. 180 capsule 3   • silver sulfadiazine (Silvadene) 1 % cream Apply 1 application topically to the appropriate area as directed 2 (Two) Times a Day. 1 each 5   • triamcinolone (KENALOG) 0.1 % ointment      • lisdexamfetamine (Vyvanse) 50 MG capsule Take 1 capsule by mouth Every Morning for 30 days 30 capsule 0   • [START ON 5/24/2023] lisdexamfetamine (Vyvanse) 50 MG capsule Take 1 capsule by mouth Every Morning for 30 days 30 capsule 0   • meloxicam (Mobic) 15 MG tablet Take 1 tablet by mouth Daily. 30 tablet 0   • predniSONE (DELTASONE) 20 MG tablet Take 1 pill twice daily for 3 days, 1 pill daily for 3 days, then half tablet daily for 4 days. 11 tablet 0   • Vyvanse 50 MG capsule Take 1 capsule by mouth Every Morning 30 capsule 0     Facility-Administered Medications Prior to Visit   Medication Dose Route Frequency Provider Last Rate Last Admin   • ferric carboxymaltose (INJECTAFER) injection solution 750 mg  750 mg Intravenous Once Alda Borges MD           No opioid medication identified on active medication list. I have reviewed chart for other potential  high risk medication/s and harmful drug interactions in the elderly.          Aspirin is not on active medication list.  Aspirin use is not indicated based on review of current medical condition/s. Risk of harm outweighs potential benefits.  .    Patient Active Problem List   Diagnosis   • Moderate persistent asthma without complication   • Bladder disorder   • Hammertoe   • Vitamin D deficiency   • RLS (restless legs syndrome)   • Attention deficit disorder (ADD) without hyperactivity   • Acute pain of right shoulder   • Iron deficiency anemia due to dietary causes   • Gastroesophageal reflux disease without esophagitis   • Vaginal atrophy   • Chronic rhinitis   • COVID-19 virus  "infection   • Mixed hyperlipidemia   • Aortic heart murmur   • Acute bronchitis   • Hypokalemia   • Vitamin B12 deficiency   • Medicare annual wellness visit, subsequent     Advance Care Planning   Advance Care Planning     Advance Directive is on file.  ACP discussion was held with the patient during this visit. Patient has an advance directive in EMR which is still valid.      Objective    Vitals:    04/05/23 1325   BP: 120/64   Pulse: 91   Temp: 99.3 °F (37.4 °C)   TempSrc: Skin   SpO2: 96%   Weight: 70.8 kg (156 lb)   Height: 152.4 cm (60\")     Estimated body mass index is 30.47 kg/m² as calculated from the following:    Height as of this encounter: 152.4 cm (60\").    Weight as of this encounter: 70.8 kg (156 lb).    BMI is >= 30 and <35. (Class 1 Obesity). The following options were offered after discussion;: nutrition counseling/recommendations      Does the patient have evidence of cognitive impairment?   No    Lab Results   Component Value Date    TRIG 72 04/04/2023    HDL 66 (H) 04/04/2023     (H) 04/04/2023    VLDL 13 04/04/2023          HEALTH RISK ASSESSMENT    Smoking Status:  Social History     Tobacco Use   Smoking Status Never   Smokeless Tobacco Never   Tobacco Comments    none     Alcohol Consumption:  Social History     Substance and Sexual Activity   Alcohol Use Yes   • Alcohol/week: 3.0 - 4.0 standard drinks   • Types: 3 - 4 Glasses of wine per week    Comment: Not every week     Fall Risk Screen:    MARIPOSAADI Fall Risk Assessment was completed, and patient is at LOW risk for falls.Assessment completed on:4/5/2023    Depression Screening:  PHQ-2/PHQ-9 Depression Screening 4/5/2023   Little Interest or Pleasure in Doing Things 0-->not at all   Feeling Down, Depressed or Hopeless 0-->not at all   PHQ-9: Brief Depression Severity Measure Score 0       Health Habits and Functional and Cognitive Screening:  Functional & Cognitive Status 4/5/2023   Do you have difficulty preparing food and eating? " No   Do you have difficulty bathing yourself, getting dressed or grooming yourself? No   Do you have difficulty using the toilet? No   Do you have difficulty moving around from place to place? No   Do you have trouble with steps or getting out of a bed or a chair? No   Current Diet Well Balanced Diet   Do you need help using the phone?  No   Are you deaf or do you have serious difficulty hearing?  No   Do you need help with transportation? No   Do you need help shopping? No   Do you need help preparing meals?  No   Do you need help with housework?  No   Do you need help with laundry? No   Do you need help taking your medications? No   Do you need help managing money? No   Do you ever drive or ride in a car without wearing a seat belt? No   Do you have difficulty concentrating, remembering or making decisions? No       Age-appropriate Screening Schedule:  Refer to the list below for future screening recommendations based on patient's age, sex and/or medical conditions. Orders for these recommended tests are listed in the plan section. The patient has been provided with a written plan.    Health Maintenance   Topic Date Due   • ZOSTER VACCINE (1 of 2) Never done   • ANNUAL WELLNESS VISIT  Never done   • COVID-19 Vaccine (4 - Booster for Moderna series) 09/04/2023 (Originally 1/11/2022)   • INFLUENZA VACCINE  08/01/2023   • DXA SCAN  12/16/2023   • LIPID PANEL  04/04/2024   • TDAP/TD VACCINES (2 - Td or Tdap) 03/15/2033   • HEPATITIS C SCREENING  Completed   • Pneumococcal Vaccine 65+  Completed                  CMS Preventative Services Quick Reference  Risk Factors Identified During Encounter:    None Identified    The above risks/problems have been discussed with the patient.  Pertinent information has been shared with the patient in the After Visit Summary.    Diagnoses and all orders for this visit:    1. Medicare annual wellness visit, subsequent (Primary)  Assessment & Plan:  AWV completed 4/23.  Patient remains  very alert and independent.  She is more active helping take care of her little puppy.  She has advance care directive on file already at the hospital.    Orders:  -     TSH; Future    2. Moderate persistent asthma without complication  Assessment & Plan:  Patient stable thus far this spring as of her 4/23 office visit.  She is on twice daily Symbicort chronically along with Singulair and Zyrtec-D.  She utilizes Pataday eyedrops as well as Flonase nasal spray as needed.  Continue same plan of care as written.      3. Attention deficit disorder (ADD) without hyperactivity  Assessment & Plan:  Patient continues to tolerate Vyvanse without any side effects, no palpitations no hypertension, so stable to continue current dosing.      4. Mixed hyperlipidemia  Assessment & Plan:  LDL is stable at 116 as of 4/23 office visit.  Her HDL remains elevated and this is protective.  Patient appears to be rather low risk for CVA CAD, so we will continue to defer treatment.    Orders:  -     Comprehensive Metabolic Panel; Future  -     Lipid Panel; Future    5. Burning with urination  -     POCT urinalysis dipstick, manual    6. Acute pain of right shoulder  Assessment & Plan:  Patient had MRI as noted above, this was reviewed at her 4/23 office visit.  Sounds like she missed a call in regards to possible follow-up with Dr. Guerrero, but as of this office visit, patient is not having any discomfort in the arm and she has very reasonable range of movement.  I think we can defer referral back at this time.      7. Vitamin D deficiency  Assessment & Plan:  Patient's vitamin D level is below 30 as of 4/23, she has been missing some doses inadvertently, should get back on it daily here recently, will repeat level on return to office.    Orders:  -     Vitamin D,25-Hydroxy; Future    8. Iron deficiency anemia due to dietary causes  Assessment & Plan:  Hemoglobin is up slightly from 12.0 12.5 as of her/23 office visit.  She has maintained off  of iron supplementation, her ferritin is still slightly elevated at 160, but it is slowly trending down.  It bumped up from 20 to 200 after IV iron in 2022.    Orders:  -     CBC & Differential; Future  -     Ferritin; Future  -     Iron Profile; Future      Follow Up:   Next Medicare Wellness visit to be scheduled in 1 year.      An After Visit Summary and PPPS were made available to the patient.

## 2023-04-05 NOTE — ASSESSMENT & PLAN NOTE
Patient had MRI as noted above, this was reviewed at her 4/23 office visit.  Sounds like she missed a call in regards to possible follow-up with Dr. Guerrero, but as of this office visit, patient is not having any discomfort in the arm and she has very reasonable range of movement.  I think we can defer referral back at this time.

## 2023-04-17 NOTE — PROGRESS NOTES
"Well Woman Visit    CC: Scheduled annual well gyn visit  Chief Complaint   Patient presents with   • Annual Exam   • Vaginal Itching       Myriad intake in the past?: No    Previously Qualified? NO    Post menopausal, using no HRT    HPI:   78 y.o.       PCP: does manage PMHx and preventative labs  History: PMHx, Meds, Allergies, PSHx, Social Hx, and POBHx all reviewed and updated.    Pt has concerns she would like to discuss.    PHYSICAL EXAM:  /72   Pulse 111   Ht 152.4 cm (60\")   Wt 70.8 kg (156 lb)   Breastfeeding No   BMI 30.47 kg/m²  Not found.  General- NAD, alert and oriented, appropriate  Psych- Normal mood, good memory  Neck- No masses, no thyroid enlargement  CV- Regular rhythm, no murnurs  Resp- CTA to bases, no wheezes  Abdomen- Soft, non distended, non tender, no masses    Breast left-  Bilaterally symmetrical, no masses, non tender, no nipple discharge  Breast right- Bilaterally symmetrical, no masses, non tender, no nipple discharge    External genitalia- Normal female, no lesions  Urethra/meatus- Normal, no masses, non tender  Bladder- Normal, no masses, non tender  Vagina- Normal, moderate atrophy, no lesions, no discharge.    Cvx- Normal, no lesions, no discharge, No cervical motion tenderness  Uterus- Normal size, shape & consistency.  Non tender, mobile, & no prolapse  Adnexa- No mass, non tender  Anus/Rectum/Perineum- Not performed    Lymphatic- No palpable neck, axillary, or groin nodes  Ext- No edema, no cyanosis    Skin- No lesions, no rashes, no acanthosis nigricans      ASSESSMENT and PLAN:    Diagnoses and all orders for this visit:    1. Well woman exam with routine gynecological exam (Primary)    2. Vaginal atrophy  -     estradiol (ESTRACE) 0.1 MG/GM vaginal cream; Insert 1 g into the vagina 2 (Two) Times a Week.  Dispense: 42.5 g; Refill: 3    3. Vaginal pruritus  -     Gardnerella vaginalis, Trichomonas vaginalis, Candida albicans, DNA - Swab, " Vagina        Preventative:  • BREAST HEALTH- Monthly self breast exam importance and how to reviewed. MMG and/or MRI (prn) reviewed per society guidelines and her individual history. Screen: Already up to date  • CERVICAL CANCER Screening- Reviewed current ASCCP guidelines for screening w and wo cotest HPV, age specific.  Screen: Not medically needed  • Follow up PCP/Specialist PMHx and Labs      She understands the importance of having any ordered tests to be performed in a timely fashion.  The risks of not performing them include, but are not limited to, advanced cancer stages, bone loss from osteoporosis and/or subsequent increase in morbidity and/or mortality.  She is encouraged to review her results online and/or contact or office if she has questions.     Follow Up:  No follow-ups on file.            Maya Lagunas MD  04/18/2023    [facility]  [address] [place] [address]  Dept: [phone]  Dept Fax: [phone]  Loc: [phone]  Loc Fax: [phone]

## 2023-04-18 ENCOUNTER — OFFICE VISIT (OUTPATIENT)
Dept: OBSTETRICS AND GYNECOLOGY | Facility: CLINIC | Age: 78
End: 2023-04-18
Payer: MEDICARE

## 2023-04-18 VITALS
HEART RATE: 111 BPM | WEIGHT: 156 LBS | DIASTOLIC BLOOD PRESSURE: 72 MMHG | SYSTOLIC BLOOD PRESSURE: 133 MMHG | BODY MASS INDEX: 30.63 KG/M2 | HEIGHT: 60 IN

## 2023-04-18 DIAGNOSIS — N95.2 VAGINAL ATROPHY: ICD-10-CM

## 2023-04-18 DIAGNOSIS — N89.8 VAGINAL PRURITUS: ICD-10-CM

## 2023-04-18 DIAGNOSIS — Z01.419 WELL WOMAN EXAM WITH ROUTINE GYNECOLOGICAL EXAM: Primary | ICD-10-CM

## 2023-04-18 LAB
CANDIDA SPECIES: NEGATIVE
GARDNERELLA VAGINALIS: NEGATIVE
T VAGINALIS DNA VAG QL PROBE+SIG AMP: NEGATIVE

## 2023-04-18 PROCEDURE — 87480 CANDIDA DNA DIR PROBE: CPT | Performed by: OBSTETRICS & GYNECOLOGY

## 2023-04-18 PROCEDURE — 87660 TRICHOMONAS VAGIN DIR PROBE: CPT | Performed by: OBSTETRICS & GYNECOLOGY

## 2023-04-18 PROCEDURE — 87510 GARDNER VAG DNA DIR PROBE: CPT | Performed by: OBSTETRICS & GYNECOLOGY

## 2023-04-18 RX ORDER — ESTRADIOL 0.1 MG/G
1 CREAM VAGINAL 2 TIMES WEEKLY
Qty: 42.5 G | Refills: 3 | Status: SHIPPED | OUTPATIENT
Start: 2023-04-20 | End: 2024-04-19

## 2023-04-20 ENCOUNTER — OFFICE VISIT (OUTPATIENT)
Dept: INTERNAL MEDICINE | Facility: CLINIC | Age: 78
End: 2023-04-20
Payer: MEDICARE

## 2023-04-20 VITALS
WEIGHT: 156.8 LBS | HEIGHT: 60 IN | HEART RATE: 83 BPM | SYSTOLIC BLOOD PRESSURE: 128 MMHG | TEMPERATURE: 98.2 F | BODY MASS INDEX: 30.78 KG/M2 | DIASTOLIC BLOOD PRESSURE: 70 MMHG | OXYGEN SATURATION: 96 %

## 2023-04-20 DIAGNOSIS — R05.2 SUBACUTE COUGH: ICD-10-CM

## 2023-04-20 DIAGNOSIS — R30.9 PAINFUL URINATION: Primary | ICD-10-CM

## 2023-04-20 DIAGNOSIS — R05.1 ACUTE COUGH: ICD-10-CM

## 2023-04-20 DIAGNOSIS — R30.0 DYSURIA: ICD-10-CM

## 2023-04-20 PROBLEM — U07.1 COVID-19 VIRUS INFECTION: Status: RESOLVED | Noted: 2022-07-11 | Resolved: 2023-04-20

## 2023-04-20 LAB
BILIRUB BLD-MCNC: NEGATIVE MG/DL
CLARITY, POC: CLEAR
COLOR UR: YELLOW
EXPIRATION DATE: NORMAL
FLUAV AG UPPER RESP QL IA.RAPID: NOT DETECTED
FLUBV AG UPPER RESP QL IA.RAPID: NOT DETECTED
GLUCOSE UR STRIP-MCNC: NEGATIVE MG/DL
INTERNAL CONTROL: NORMAL
KETONES UR QL: NEGATIVE
LEUKOCYTE EST, POC: ABNORMAL
Lab: NORMAL
NITRITE UR-MCNC: POSITIVE MG/ML
PH UR: 6 [PH] (ref 5–8)
PROT UR STRIP-MCNC: NEGATIVE MG/DL
RBC # UR STRIP: NEGATIVE /UL
SARS-COV-2 AG UPPER RESP QL IA.RAPID: NOT DETECTED
SP GR UR: 1.01 (ref 1–1.03)
UROBILINOGEN UR QL: ABNORMAL

## 2023-04-20 PROCEDURE — 87077 CULTURE AEROBIC IDENTIFY: CPT | Performed by: INTERNAL MEDICINE

## 2023-04-20 PROCEDURE — 87186 SC STD MICRODIL/AGAR DIL: CPT | Performed by: INTERNAL MEDICINE

## 2023-04-20 PROCEDURE — 87086 URINE CULTURE/COLONY COUNT: CPT | Performed by: INTERNAL MEDICINE

## 2023-04-20 RX ORDER — PSEUDOEPHEDRINE HCL 120 MG/1
120 TABLET, FILM COATED, EXTENDED RELEASE ORAL EVERY 12 HOURS
Qty: 180 TABLET | Refills: 1 | Status: SHIPPED | OUTPATIENT
Start: 2023-04-20

## 2023-04-20 RX ORDER — CEPHALEXIN 500 MG/1
500 CAPSULE ORAL 3 TIMES DAILY
Qty: 21 CAPSULE | Refills: 0 | Status: SHIPPED | OUTPATIENT
Start: 2023-04-20

## 2023-04-20 NOTE — ASSESSMENT & PLAN NOTE
Patient gave urine sample today in the office 4/23, nitrite was positive and leukocyte was mild.  We will see what culture shows and make further recommendations at that time.

## 2023-04-20 NOTE — ASSESSMENT & PLAN NOTE
Patient with ongoing issues in regards to cough as of 4/23 urgent visit.  She felt like she benefited from Keflex when given in 8/22, so reasonable to try that again now.  I do not hear any wheezing on exam presently, patient to call if this seems to persist at home, and then we will use short course of corticosteroids.  Otherwise she will continue with her aggressive regimen for asthma/allergies as outlined below.

## 2023-04-20 NOTE — PROGRESS NOTES
"Chief Complaint  Urinary Tract Infection (Painful urination) and Cough (Pt is having cough and congestion. She felt that this is allergies, but she is coughing and hears wheezing in chest. She is nauseated as well)    Symone      Kathleenncroly Bhatia presents to [facility]    History of present illness:  76 yo female, former pt of Dr Posey, who has has underlying ADD, RAD, GERD, RLS, among others.  She was seen 8/22 as a New Patient, and she is coming in now 4/23 for urgent visit after recent routine 4-month follow-up.  We will go over her med list, review any recent labs, and make further recommendations at that time.    Review of Systems   Constitutional: Negative for appetite change, fatigue and fever.   HENT: Negative for congestion and ear pain.    Eyes: Negative for blurred vision.   Respiratory: Positive for cough and wheezing. Negative for chest tightness and shortness of breath.    Cardiovascular: Negative for chest pain, palpitations and leg swelling.   Gastrointestinal: Negative for abdominal pain.   Genitourinary: Positive for dysuria. Negative for difficulty urinating and hematuria.   Musculoskeletal: Negative for arthralgias and gait problem.   Skin: Negative for skin lesions.   Neurological: Negative for syncope, memory problem and confusion.   Psychiatric/Behavioral: Negative for self-injury and depressed mood.       Objective   Vital Signs:   /70   Pulse 83   Temp 98.2 °F (36.8 °C) (Skin)   Ht 152.4 cm (60\")   Wt 71.1 kg (156 lb 12.8 oz)   SpO2 96%   BMI 30.62 kg/m²           Physical Exam  Vitals and nursing note reviewed.   Constitutional:       General: She is not in acute distress.     Appearance: Normal appearance. She is not toxic-appearing.   HENT:      Head: Atraumatic.      Right Ear: External ear normal.      Left Ear: External ear normal.      Nose: Nose normal.      Mouth/Throat:      Mouth: Mucous membranes are moist.   Eyes:      " General:         Right eye: No discharge.         Left eye: No discharge.      Extraocular Movements: Extraocular movements intact.      Pupils: Pupils are equal, round, and reactive to light.   Cardiovascular:      Rate and Rhythm: Regular rhythm. Tachycardia present.      Pulses: Normal pulses.      Heart sounds: Normal heart sounds. No murmur heard.    No gallop.      Comments: Little tachycardic, but no ectopy.  Pulmonary:      Effort: Pulmonary effort is normal. No respiratory distress.      Breath sounds: No wheezing, rhonchi or rales.      Comments: Patient with scattered rhonchi, no labored respiration.  Abdominal:      General: There is no distension.      Palpations: Abdomen is soft. There is no mass.      Tenderness: There is no abdominal tenderness. There is no guarding.   Musculoskeletal:         General: No swelling or tenderness.      Cervical back: No tenderness.      Right lower leg: No edema.      Left lower leg: No edema.      Comments: No edema.   Skin:     General: Skin is warm and dry.      Findings: No rash.   Neurological:      General: No focal deficit present.      Mental Status: She is alert and oriented to person, place, and time. Mental status is at baseline.      Motor: No weakness.      Gait: Gait normal.   Psychiatric:         Mood and Affect: Mood normal.         Thought Content: Thought content normal.          Result Review   The following data was reviewed by: Marco Antonio Leonard MD on 08/10/2022:  [x] Laboratory  [] Microbiology  [] Radiology  [] EKG/telemetry  [] Cardiology/Vascular  [] Pathology  [x] Old records             Assessment and Plan   Diagnoses and all orders for this visit:    1. Painful urination (Primary)  -     POCT urinalysis dipstick, manual  -     Urine Culture - Urine, Urine, Clean Catch; Future  -     Urine Culture - Urine, Urine, Clean Catch    2. Acute cough  -     POCT SARS-CoV-2 Antigen DAVIN + Flu    3. Subacute cough  Assessment & Plan:  Patient with ongoing  issues in regards to cough as of 4/23 urgent visit.  She felt like she benefited from Keflex when given in 8/22, so reasonable to try that again now.  I do not hear any wheezing on exam presently, patient to call if this seems to persist at home, and then we will use short course of corticosteroids.  Otherwise she will continue with her aggressive regimen for asthma/allergies as outlined below.      4. Dysuria  Assessment & Plan:  Patient gave urine sample today in the office 4/23, nitrite was positive and leukocyte was mild.  We will see what culture shows and make further recommendations at that time.    Orders:  -     Urine Culture - Urine, Urine, Clean Catch; Future  -     Urine Culture - Urine, Urine, Clean Catch    Other orders  -     cephalexin (Keflex) 500 MG capsule; Take 1 capsule by mouth 3 (Three) Times a Day.  Dispense: 21 capsule; Refill: 0  -     pseudoephedrine (Sudafed 12 Hour) 120 MG 12 hr tablet; Take 1 tablet by mouth Every 12 (Twelve) Hours.  Dispense: 180 tablet; Refill: 1        Follow Up   Return for Next scheduled follow up.  Patient was given instructions and counseling regarding her condition or for health maintenance advice. Please see specific information pulled into the AVS if appropriate.

## 2023-04-20 NOTE — ASSESSMENT & PLAN NOTE
Patient stable thus far this spring as of her 4/23 office visit.  She is on twice daily Symbicort chronically along with Singulair and Zyrtec-D.  She utilizes Pataday eyedrops as well as Flonase nasal spray as needed.  Continue same plan of care as written.--->maybe worse as of 4/23 OV just 2 weeks later.  She has been hearing some wheezing despite continued use of all the above.

## 2023-04-22 LAB — BACTERIA SPEC AEROBE CULT: ABNORMAL

## 2023-05-01 ENCOUNTER — OFFICE VISIT (OUTPATIENT)
Dept: PODIATRY | Facility: CLINIC | Age: 78
End: 2023-05-01
Payer: MEDICARE

## 2023-05-01 VITALS
WEIGHT: 156 LBS | OXYGEN SATURATION: 94 % | HEART RATE: 86 BPM | DIASTOLIC BLOOD PRESSURE: 80 MMHG | TEMPERATURE: 98 F | BODY MASS INDEX: 30.63 KG/M2 | SYSTOLIC BLOOD PRESSURE: 145 MMHG | HEIGHT: 60 IN

## 2023-05-01 DIAGNOSIS — M20.11 VALGUS DEFORMITY OF BOTH GREAT TOES: Primary | ICD-10-CM

## 2023-05-01 DIAGNOSIS — L89.891 PRESSURE INJURY OF RIGHT FOOT, STAGE 1: ICD-10-CM

## 2023-05-01 DIAGNOSIS — M79.671 FOOT PAIN, BILATERAL: ICD-10-CM

## 2023-05-01 DIAGNOSIS — M20.12 VALGUS DEFORMITY OF BOTH GREAT TOES: Primary | ICD-10-CM

## 2023-05-01 DIAGNOSIS — M79.672 FOOT PAIN, BILATERAL: ICD-10-CM

## 2023-05-01 DIAGNOSIS — B35.1 ONYCHOMYCOSIS: ICD-10-CM

## 2023-05-01 DIAGNOSIS — L89.891 PRESSURE INJURY OF TOE OF RIGHT FOOT, STAGE 1: ICD-10-CM

## 2023-05-01 DIAGNOSIS — L60.0 ONYCHOCRYPTOSIS: ICD-10-CM

## 2023-05-01 DIAGNOSIS — M20.42 HAMMER TOES OF BOTH FEET: ICD-10-CM

## 2023-05-01 DIAGNOSIS — M20.41 HAMMER TOES OF BOTH FEET: ICD-10-CM

## 2023-05-01 NOTE — PROGRESS NOTES
[facility]    Today's Date: 05/01/23    Patient Name: Erik Bhatia  MRN: [id number]  CSN: [id number]  PCP: Marco Antonio Leonard MD, last PCP visit: 20 April 2023  Referring Provider: No ref. provider found    SUBJECTIVE     Chief Complaint   Patient presents with   • Left Foot - Follow-up, Nail Problem, Callouses   • Right Foot - Follow-up, Nail Problem, Callouses     HPI: Erik Bhatia, a 78 y.o.female, comes presents to clinic with chief complaint of:    New, Established, New Problem: Established    Location:  hyperkeratotic lesion(s) on dorsal right second proximal interphalangeal joint    Duration: 2015    Onset:  gradual    Nature:  sore    Stable, worsening, improving: Stable, recurring    Aggravating factors:  Patient reports lesion(s) is painful with shoegear and ambulation.      Previous Treatment:   Debridement  ---------------------------  New, Established, New Problem: est    Location: Toenails    Duration:  Greater than five years    Onset: Gradual    Nature: sore with palpation.    Stable, worsening, improving: stable    Aggravating factors: Pain with shoe gear and ambulation.    Previous Treatment:  Debridement    Patient relates no medical changes since their last visit.    Patient denies any fevers, chills, nausea, vomiting, shortness of breathe, nor any other constitutional signs nor symptoms.    Past Medical History:   Diagnosis Date   • ADHD (attention deficit hyperactivity disorder) about 2000   • Allergic ?   • Anemia ?   • Arthritis    • Asthma    • Bladder disorder    • Bunion    • Callus ?   • Chronic allergic rhinitis    • Corns and callus    • GERD (gastroesophageal reflux disease)    • Hammertoe    • Ingrown toenail    • Limb pain    • Limb swelling    • Numbness in feet    • SOB (shortness of breath)      Past Surgical History:   Procedure Laterality Date   • BREAST BIOPSY     • CARPAL TUNNEL RELEASE     • COLONOSCOPY  2020 and before   • ENDOSCOPY     • TRIGGER  FINGER RELEASE       Family History   Problem Relation Age of Onset   • Stomach cancer Father    • Diabetes Father    • Asthma Father    • Cancer Father         stomach   • Heart disease Mother    • Diabetes Maternal Grandfather    • Breast cancer Neg Hx    • Ovarian cancer Neg Hx    • Uterine cancer Neg Hx    • Colon cancer Neg Hx      Social History     Socioeconomic History   • Marital status:    Tobacco Use   • Smoking status: Never     Passive exposure: Never   • Smokeless tobacco: Never   • Tobacco comments:     none   Vaping Use   • Vaping Use: Never used   Substance and Sexual Activity   • Alcohol use: Yes     Alcohol/week: 3.0 - 4.0 standard drinks     Types: 3 - 4 Glasses of wine per week     Comment: Not every week   • Drug use: Never   • Sexual activity: Not Currently     Partners: Male     Birth control/protection: Pill, I.U.D.     Comment: Birth control previously but listed above     Allergies   Allergen Reactions   • Seasonal Ic [Kelp-B6-Lecithin-Vinegar] Cough   • Nitrofurantoin Provider Review Needed     Current Outpatient Medications   Medication Sig Dispense Refill   • aluminum hydroxide-magnesium carbonate (Gaviscon)  MG/15ML suspension oral suspension 1 mL.     • budesonide-formoterol (SYMBICORT) 160-4.5 MCG/ACT inhaler Inhale 2 puffs 2 (Two) Times a Day. 10.2 g 1   • cephalexin (Keflex) 500 MG capsule Take 1 capsule by mouth 3 (Three) Times a Day. 21 capsule 0   • chlorhexidine (PERIDEX) 0.12 % solution      • Cholecalciferol 125 MCG (5000 UT) tablet 1 tablet Daily.     • Diclofenac Sodium (VOLTAREN) 1 % gel gel Apply 4 g topically to the appropriate area as directed 4 (Four) Times a Day As Needed (right shoulder pain). 150 g 1   • estradiol (ESTRACE) 0.1 MG/GM vaginal cream Insert 1 g into the vagina 2 (Two) Times a Week. 42.5 g 3   • fluticasone (Flonase) 50 MCG/ACT nasal spray 2 sprays into the nostril(s) as directed by provider Daily. 45 mL 3   • lisdexamfetamine (Vyvanse) 50  MG capsule Take 1 capsule by mouth Every Morning for 30 days 30 capsule 0   • montelukast (SINGULAIR) 10 MG tablet TAKE 1 TABLET DAILY 90 tablet 3   • olopatadine (PATADAY) 0.2 % solution ophthalmic solution 1 drop.     • omeprazole (priLOSEC) 20 MG capsule TAKE 1 CAPSULE DAILY 90 capsule 3   • potassium chloride (MICRO-K) 10 MEQ CR capsule TAKE 2 CAPSULES DAILY. 180 capsule 3   • pseudoephedrine (Sudafed 12 Hour) 120 MG 12 hr tablet Take 1 tablet by mouth Every 12 (Twelve) Hours. 180 tablet 1   • silver sulfadiazine (Silvadene) 1 % cream Apply 1 application topically to the appropriate area as directed 2 (Two) Times a Day. 1 each 5   • triamcinolone (KENALOG) 0.1 % ointment        Current Facility-Administered Medications   Medication Dose Route Frequency Provider Last Rate Last Admin   • ferric carboxymaltose (INJECTAFER) injection solution 750 mg  750 mg Intravenous Once Alda Borges MD         Review of Systems   Constitutional: Negative.    Skin:        Painful hyperkeratotic lesion and toenails   All other systems reviewed and are negative.      OBJECTIVE     Vitals:    05/01/23 1355   BP: 145/80   Pulse: 86   Temp: 98 °F (36.7 °C)   SpO2: 94%       Body mass index is 30.47 kg/m².    Lab Results   Component Value Date    GLUCOSE 93 04/04/2023    CALCIUM 10.1 04/04/2023     04/04/2023    K 3.7 04/04/2023    CO2 24.6 04/04/2023     04/04/2023    BUN 19 04/04/2023    CREATININE 0.70 04/04/2023    EGFRIFNONA 79 09/10/2021    BCR 27.1 (H) 04/04/2023    ANIONGAP 10.4 04/04/2023       Patient seen in no apparent distress.      PHYSICAL EXAM:     Foot/Ankle Exam    GENERAL  Appearance:  appears stated age and elderly  Orientation:  AAOx3  Affect:  appropriate  Gait:  unimpaired  Assistance:  independent  Right shoe gear: casual shoe  Left shoe gear: casual shoe    VASCULAR     Right Foot Vascularity   Normal vascular exam    Dorsalis pedis:  2+  Posterior tibial:  2+  Skin temperature:  warm  Edema  grading:  None  CFT:  < 3 seconds  Pedal hair growth:  Present  Varicosities:  none     Left Foot Vascularity   Normal vascular exam    Dorsalis pedis:  2+  Posterior tibial:  2+  Skin temperature:  warm  Edema grading:  None  CFT:  < 3 seconds  Pedal hair growth:  Present  Varicosities:  none     NEUROLOGIC     Right Foot Neurologic   Normal sensation    Light touch sensation: normal  Vibratory sensation: normal  Hot/Cold sensation: normal     Left Foot Neurologic   Normal sensation    Light touch sensation: normal  Vibratory sensation: normal  Hot/Cold sensation:  normal    MUSCULOSKELETAL     Right Foot Musculoskeletal   Hammertoe:  Second toe, third toe, fourth toe and fifth toe  Hallux valgus: Yes       Left Foot Musculoskeletal   Hammertoe:  Second toe, third toe, fourth toe and fifth toe  Hallux valgus: Yes      MUSCLE STRENGTH     Right Foot Muscle Strength   Foot dorsiflexion:  4  Foot plantar flexion:  4  Foot inversion:  4  Foot eversion:  4     Left Foot Muscle Strength   Foot dorsiflexion:  4  Foot plantar flexion:  4  Foot inversion:  4  Foot eversion:  4    RANGE OF MOTION     Right Foot Range of Motion   Foot and ankle ROM within normal limits       Left Foot Range of Motion   Foot and ankle ROM within normal limits      DERMATOLOGIC      Right Foot Dermatologic   Skin  Positive for ulcer.   Nails  1.  Positive for elongated, onychomycosis, abnormal thickness, subungual debris and ingrown toenail.  2.  Positive for elongated, onychomycosis, abnormal thickness, subungual debris and ingrown toenail.  3.  Positive for elongated, onychomycosis, abnormal thickness, subungual debris and ingrown toenail.  4.  Positive for elongated, onychomycosis, abnormal thickness, subungual debris and ingrown toenail.  5.  Positive for elongated, onychomycosis, abnormal thickness, subungual debris and ingrown toenail.     Left Foot Dermatologic   Skin  Left foot skin is intact.   Nails  1.  Positive for elongated,  onychomycosis, abnormal thickness, subungual debris and ingrown toenail.  2.  Positive for elongated, onychomycosis, abnormal thickness, subungual debris and ingrown toenail.  3.  Positive for elongated, onychomycosis, abnormal thickness, subungual debris and ingrown toenail.  4.  Positive for elongated, onychomycosis, abnormally thick, subungual debris and ingrown toenail.  5.  Positive for elongated, onychomycosis, abnormally thick, subungual debris and ingrown toenail.     Right foot additional comments: Stage 1 ulceration on the dorsal right second PIP joint area of the foot.  Hyperkeratotic tissue with subepidermal hemosiderin deposition is present.  No surrounding, edema, erythema, lymphangitis, fluctuance, nor signs of infection.  No drainage present.  10 mm x 10 mm x 3 mm.  This area was debrided via excisional partial thickness debridement with a 15 scalpel blade.  Post debridement measurements were 9 mm x 8 mm x 1 mm in depth.        ASSESSMENT/PLAN     Diagnoses and all orders for this visit:    1. 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 Hammer toes of both feet    3. Pressure injury of right foot, stage 1    4. Onychocryptosis    5. Pressure injury of toe of right foot, stage 1    6. Foot pain, bilateral    7. Onychomycosis        Comprehensive lower extremity examination and evaluation was performed.    Discussed findings and treatment plan including risks, benefits, and treatment options with patient in detail. Patient agreed with treatment plan.    Toenails 1 through 5 bilaterally were debrided in thickness and length with toenail tremors.  These were then smoothed with a dermal sanding bur.  Patient tolerated the procedure well.    An After Visit Summary was printed and given to the patient at discharge, including (if requested) any available informative/educational handouts regarding diagnosis, treatment, or medications. All questions were answered to patient/family satisfaction. Should  symptoms fail to improve or worsen they agree to call or return to clinic or to go to the Emergency Department. Discussed the importance of following up with any needed screening tests/labs/specialist appointments and any requested follow-up recommended by me today. Importance of maintaining follow-up discussed and patient accepts that missed appointments can delay diagnosis and potentially lead to worsening of conditions.    Return in about 9 weeks (around 7/3/2023) for Ulcer Follow-Up, Toenail Care., or sooner if acute issues arise.    This document has been electronically signed by Rian Jo DPM on May 1, 2023 14:02 EDT

## 2023-05-05 RX ORDER — POTASSIUM CHLORIDE 750 MG/1
20 CAPSULE, EXTENDED RELEASE ORAL DAILY
Qty: 180 CAPSULE | Refills: 3 | Status: SHIPPED | OUTPATIENT
Start: 2023-05-05

## 2023-05-05 RX ORDER — MONTELUKAST SODIUM 10 MG/1
10 TABLET ORAL DAILY
Qty: 90 TABLET | Refills: 3 | Status: SHIPPED | OUTPATIENT
Start: 2023-05-05

## 2023-05-05 RX ORDER — OMEPRAZOLE 20 MG/1
20 CAPSULE, DELAYED RELEASE ORAL DAILY
Qty: 90 CAPSULE | Refills: 3 | Status: SHIPPED | OUTPATIENT
Start: 2023-05-05

## 2023-05-05 NOTE — TELEPHONE ENCOUNTER
Rx Refill Note  Requested Prescriptions     Pending Prescriptions Disp Refills   • potassium chloride (MICRO-K) 10 MEQ CR capsule 180 capsule 3     Sig: Take 2 capsules by mouth Daily.      Last office visit with prescribing clinician: 4/20/2023   Last telemedicine visit with prescribing clinician: 10/4/2023   Next office visit with prescribing clinician: 10/4/2023                         Would you like a call back once the refill request has been completed: [] Yes [] No    If the office needs to give you a call back, can they leave a voicemail: [] Yes [] No    Juli Denton MA  05/05/23, 15:21 EDT

## 2023-05-05 NOTE — TELEPHONE ENCOUNTER
Rx Refill Note  Requested Prescriptions      No prescriptions requested or ordered in this encounter      Last office visit with prescribing clinician: 4/20/2023   Last telemedicine visit with prescribing clinician: 10/4/2023   Next office visit with prescribing clinician: 10/4/2023                         Would you like a call back once the refill request has been completed: [] Yes [] No    If the office needs to give you a call back, can they leave a voicemail: [] Yes [] No    Juli Denton MA  05/05/23, 16:07 EDT

## 2023-05-25 RX ORDER — BUDESONIDE AND FORMOTEROL FUMARATE DIHYDRATE 160; 4.5 UG/1; UG/1
2 AEROSOL RESPIRATORY (INHALATION)
Qty: 10.2 G | Refills: 1 | Status: SHIPPED | OUTPATIENT
Start: 2023-05-25

## 2023-05-25 NOTE — TELEPHONE ENCOUNTER
Caller: Erik Bhatia    Relationship: Self    Best call back number: [phone]    Requested Prescriptions:   Requested Prescriptions     Pending Prescriptions Disp Refills   • budesonide-formoterol (SYMBICORT) 160-4.5 MCG/ACT inhaler 10.2 g 1     Sig: Inhale 2 puffs 2 (Two) Times a Day.        Pharmacy where request should be sent: [facility] [id number]  [place] - [address] AT [facility] [address] - [phone] [facility] [phone] FX     Last office visit with prescribing clinician: 4/20/2023   Last telemedicine visit with prescribing clinician: Visit date not found   Next office visit with prescribing clinician: 10/4/2023     Does the patient have less than a 3 day supply:  [x] Yes  [] No

## 2023-06-01 DIAGNOSIS — F98.8 ATTENTION DEFICIT DISORDER (ADD) WITHOUT HYPERACTIVITY: ICD-10-CM

## 2023-06-01 NOTE — TELEPHONE ENCOUNTER
Caller: Erik Bhatia    Relationship: Self    Best call back number: [phone]    Requested Prescriptions:   Requested Prescriptions     Pending Prescriptions Disp Refills   • lisdexamfetamine (Vyvanse) 50 MG capsule 30 capsule 0     Sig: Take 1 capsule by mouth Every Morning for 30 days        Pharmacy where request should be sent: [facility] [phone]  [facility] [place] [facility] [address] AT [facility] [address] - [phone] [facility] [phone] FX     Last office visit with prescribing clinician: 4/20/2023   Last telemedicine visit with prescribing clinician: Visit date not found   Next office visit with prescribing clinician: 10/4/2023     Does the patient have less than a 3 day supply:  [x] Yes  [] No    Would you like a call back once the refill request has been completed: [] Yes [x] No    If the office needs to give you a call back, can they leave a voicemail: [] Yes [x] No    Rosanna Perales Rep   06/01/23 11:43 EDT

## 2023-06-20 PROBLEM — R30.0 DYSURIA: Status: RESOLVED | Noted: 2023-04-20 | Resolved: 2023-06-20

## 2023-06-20 PROBLEM — J20.9 ACUTE BRONCHITIS: Status: RESOLVED | Noted: 2022-11-22 | Resolved: 2023-06-20

## 2023-06-20 PROBLEM — N32.9 BLADDER DISORDER: Status: RESOLVED | Noted: 2021-07-28 | Resolved: 2023-06-20

## 2023-06-20 PROBLEM — M20.40 HAMMERTOE: Status: RESOLVED | Noted: 2021-07-28 | Resolved: 2023-06-20

## 2023-06-20 PROBLEM — R05.2 SUBACUTE COUGH: Status: RESOLVED | Noted: 2023-04-20 | Resolved: 2023-06-20
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
## 2023-06-21 PROBLEM — S30.861A INSECT BITE OF ABDOMINAL WALL: Status: ACTIVE | Noted: 2023-06-21

## 2023-06-21 PROBLEM — L03.311 CELLULITIS OF ABDOMINAL WALL: Status: ACTIVE | Noted: 2023-06-21

## 2023-07-24 ENCOUNTER — OFFICE VISIT (OUTPATIENT)
Dept: PODIATRY | Facility: CLINIC | Age: 78
End: 2023-07-24
Payer: MEDICARE

## 2023-07-24 VITALS
TEMPERATURE: 97.4 F | DIASTOLIC BLOOD PRESSURE: 80 MMHG | HEART RATE: 100 BPM | BODY MASS INDEX: 30.08 KG/M2 | OXYGEN SATURATION: 95 % | SYSTOLIC BLOOD PRESSURE: 130 MMHG | WEIGHT: 154 LBS

## 2023-07-24 DIAGNOSIS — L60.0 ONYCHOCRYPTOSIS: ICD-10-CM

## 2023-07-24 DIAGNOSIS — M20.42 HAMMER TOES OF BOTH FEET: ICD-10-CM

## 2023-07-24 DIAGNOSIS — M20.12 VALGUS DEFORMITY OF BOTH GREAT TOES: Primary | ICD-10-CM

## 2023-07-24 DIAGNOSIS — M20.41 HAMMER TOES OF BOTH FEET: ICD-10-CM

## 2023-07-24 DIAGNOSIS — M20.11 VALGUS DEFORMITY OF BOTH GREAT TOES: Primary | ICD-10-CM

## 2023-07-24 DIAGNOSIS — L89.891 PRESSURE INJURY OF RIGHT FOOT, STAGE 1: ICD-10-CM

## 2023-07-24 DIAGNOSIS — L89.891 PRESSURE INJURY OF TOE OF RIGHT FOOT, STAGE 1: ICD-10-CM

## 2023-07-24 DIAGNOSIS — B35.1 ONYCHOMYCOSIS: ICD-10-CM

## 2023-07-24 PROCEDURE — 1160F RVW MEDS BY RX/DR IN RCRD: CPT | Performed by: PODIATRIST

## 2023-07-24 PROCEDURE — 97597 DBRDMT OPN WND 1ST 20 CM/<: CPT | Performed by: PODIATRIST

## 2023-07-24 PROCEDURE — 1159F MED LIST DOCD IN RCRD: CPT | Performed by: PODIATRIST

## 2023-07-24 PROCEDURE — 11721 DEBRIDE NAIL 6 OR MORE: CPT | Performed by: PODIATRIST

## 2023-07-24 NOTE — PROGRESS NOTES
[facility] - PODIATRY    Today's Date: 07/24/23    Patient Name: Erik Bhatia  MRN: [id number]  CSN: [id number]  PCP: Marco Antonio Leonard MD, last PCP visit: 6/21/2023  Referring Provider: No ref. provider found    SUBJECTIVE     Chief Complaint   Patient presents with    Left Foot - Follow-up, Nail Problem    Right Foot - Follow-up, Nail Problem     HPI: Erik Bhatia, a 78 y.o.female, comes presents to clinic with chief complaint of:    New, Established, New Problem: Established    Location:  hyperkeratotic lesion(s) on dorsal right second proximal interphalangeal joint    Duration: 2015    Onset:  gradual    Nature:  sore    Stable, worsening, improving: Stable, recurring    Aggravating factors:  Patient reports lesion(s) is painful with shoegear and ambulation.      Previous Treatment:   Debridement  ---------------------------  New, Established, New Problem: est    Location: Toenails    Duration:  Greater than five years    Onset: Gradual    Nature: sore with palpation.    Stable, worsening, improving: stable    Aggravating factors: Pain with shoe gear and ambulation.    Previous Treatment:  Debridement    Medical changes:  none.    Patient denies any fevers, chills, nausea, vomiting, shortness of breathe, nor any other constitutional signs nor symptoms.    Past Medical History:   Diagnosis Date    ADHD (attention deficit hyperactivity disorder) about 2000    Allergic ?    Anemia ?    Arthritis     Asthma     Bladder disorder     Bunion     Callus ?    Chronic allergic rhinitis     Corns and callus     GERD (gastroesophageal reflux disease)     Hammertoe     Ingrown toenail     Limb pain     Limb swelling     Numbness in feet     SOB (shortness of breath)      Past Surgical History:   Procedure Laterality Date    BREAST BIOPSY      CARPAL TUNNEL RELEASE      COLONOSCOPY  2020 and before    ENDOSCOPY      TRIGGER FINGER RELEASE       Family History   Problem Relation Age of Onset    Stomach  cancer Father     Diabetes Father     Asthma Father     Cancer Father         stomach    Heart disease Mother     Diabetes Maternal Grandfather     Breast cancer Neg Hx     Ovarian cancer Neg Hx     Uterine cancer Neg Hx     Colon cancer Neg Hx      Social History     Socioeconomic History    Marital status:    Tobacco Use    Smoking status: Never     Passive exposure: Never    Smokeless tobacco: Never    Tobacco comments:     none   Vaping Use    Vaping Use: Never used   Substance and Sexual Activity    Alcohol use: Yes     Alcohol/week: 3.0 - 4.0 standard drinks     Types: 3 - 4 Glasses of wine per week     Comment: Not every week    Drug use: Never    Sexual activity: Not Currently     Partners: Male     Birth control/protection: Pill, I.U.D.     Comment: Birth control previously but listed above     Allergies   Allergen Reactions    Seasonal Ic [Kelp-B6-Lecithin-Vinegar] Cough    Nitrofurantoin Provider Review Needed     Current Outpatient Medications   Medication Sig Dispense Refill    aluminum hydroxide-magnesium carbonate (Gaviscon)  MG/15ML suspension oral suspension 1 mL.      budesonide-formoterol (SYMBICORT) 160-4.5 MCG/ACT inhaler Inhale 2 puffs 2 (Two) Times a Day. 10.2 g 1    chlorhexidine (PERIDEX) 0.12 % solution       Cholecalciferol 125 MCG (5000 UT) tablet 1 tablet Daily.      Diclofenac Sodium (VOLTAREN) 1 % gel gel Apply 4 g topically to the appropriate area as directed 4 (Four) Times a Day As Needed (right shoulder pain). 150 g 1    estradiol (ESTRACE) 0.1 MG/GM vaginal cream Insert 1 g into the vagina 2 (Two) Times a Week. 42.5 g 3    fluticasone (Flonase) 50 MCG/ACT nasal spray 2 sprays into the nostril(s) as directed by provider Daily. 45 mL 3    lisdexamfetamine (Vyvanse) 50 MG capsule Take 1 capsule by mouth Every Morning for 30 days 30 capsule 0    montelukast (SINGULAIR) 10 MG tablet Take 1 tablet by mouth Daily. 90 tablet 3    olopatadine (PATADAY) 0.2 % solution ophthalmic  solution 1 drop.      omeprazole (priLOSEC) 20 MG capsule Take 1 capsule by mouth Daily. 90 capsule 3    potassium chloride (MICRO-K) 10 MEQ CR capsule Take 2 capsules by mouth Daily. 180 capsule 3    pseudoephedrine (Sudafed 12 Hour) 120 MG 12 hr tablet Take 1 tablet by mouth Every 12 (Twelve) Hours. 180 tablet 1    silver sulfadiazine (Silvadene) 1 % cream Apply 1 application topically to the appropriate area as directed 2 (Two) Times a Day. 1 each 5    triamcinolone (KENALOG) 0.1 % ointment Apply  topically to the appropriate area as directed 3 (Three) Times a Day. 30 g 1    cephalexin (Keflex) 500 MG capsule Take 1 capsule by mouth 3 (Three) Times a Day. (Patient not taking: Reported on 7/24/2023) 21 capsule 0     Current Facility-Administered Medications   Medication Dose Route Frequency Provider Last Rate Last Admin    ferric carboxymaltose (INJECTAFER) injection solution 750 mg  750 mg Intravenous Once Alda Borges MD         Review of Systems   Constitutional: Negative.    Skin:         Painful hyperkeratotic lesion and toenails   All other systems reviewed and are negative.    OBJECTIVE     Vitals:    07/24/23 1336   BP: 130/80   Pulse: 100   Temp: 97.4 °F (36.3 °C)   SpO2: 95%       Body mass index is 30.08 kg/m².    Lab Results   Component Value Date    GLUCOSE 93 04/04/2023    CALCIUM 10.1 04/04/2023     04/04/2023    K 3.7 04/04/2023    CO2 24.6 04/04/2023     04/04/2023    BUN 19 04/04/2023    CREATININE 0.70 04/04/2023    EGFRIFNONA 79 09/10/2021    BCR 27.1 (H) 04/04/2023    ANIONGAP 10.4 04/04/2023       Patient seen in no apparent distress.      PHYSICAL EXAM:     Foot/Ankle Exam    GENERAL  Appearance:  appears stated age and elderly  Orientation:  AAOx3  Affect:  appropriate  Gait:  unimpaired  Assistance:  independent  Right shoe gear: casual shoe  Left shoe gear: casual shoe    VASCULAR     Right Foot Vascularity   Normal vascular exam    Dorsalis pedis:  2+  Posterior tibial:   2+  Skin temperature:  warm  Edema grading:  None  CFT:  < 3 seconds  Pedal hair growth:  Present  Varicosities:  none     Left Foot Vascularity   Normal vascular exam    Dorsalis pedis:  2+  Posterior tibial:  2+  Skin temperature:  warm  Edema grading:  None  CFT:  < 3 seconds  Pedal hair growth:  Present  Varicosities:  none     NEUROLOGIC     Right Foot Neurologic   Normal sensation    Light touch sensation: normal  Vibratory sensation: normal  Hot/Cold sensation: normal     Left Foot Neurologic   Normal sensation    Light touch sensation: normal  Vibratory sensation: normal  Hot/Cold sensation:  normal    MUSCULOSKELETAL     Right Foot Musculoskeletal   Hammertoe:  Second toe, third toe, fourth toe and fifth toe  Hallux valgus: Yes       Left Foot Musculoskeletal   Hammertoe:  Second toe, third toe, fourth toe and fifth toe  Hallux valgus: Yes      MUSCLE STRENGTH     Right Foot Muscle Strength   Foot dorsiflexion:  4  Foot plantar flexion:  4  Foot inversion:  4  Foot eversion:  4     Left Foot Muscle Strength   Foot dorsiflexion:  4  Foot plantar flexion:  4  Foot inversion:  4  Foot eversion:  4    RANGE OF MOTION     Right Foot Range of Motion   Foot and ankle ROM within normal limits       Left Foot Range of Motion   Foot and ankle ROM within normal limits      DERMATOLOGIC      Right Foot Dermatologic   Skin  Positive for ulcer.   Nails  1.  Positive for elongated, onychomycosis, abnormal thickness, subungual debris and ingrown toenail.  2.  Positive for elongated, onychomycosis, abnormal thickness, subungual debris and ingrown toenail.  3.  Positive for elongated, onychomycosis, abnormal thickness, subungual debris and ingrown toenail.  4.  Positive for elongated, onychomycosis, abnormal thickness, subungual debris and ingrown toenail.  5.  Positive for elongated, onychomycosis, abnormal thickness, subungual debris and ingrown toenail.     Left Foot Dermatologic   Skin  Left foot skin is intact.    Nails  1.  Positive for elongated, onychomycosis, abnormal thickness, subungual debris and ingrown toenail.  2.  Positive for elongated, onychomycosis, abnormal thickness, subungual debris and ingrown toenail.  3.  Positive for elongated, onychomycosis, abnormal thickness, subungual debris and ingrown toenail.  4.  Positive for elongated, onychomycosis, abnormally thick, subungual debris and ingrown toenail.  5.  Positive for elongated, onychomycosis, abnormally thick, subungual debris and ingrown toenail.     Right foot additional comments: Stage 1 ulceration on the dorsal right second PIP joint area of the foot.  Hyperkeratotic tissue with subepidermal hemosiderin deposition is present.  No surrounding, edema, erythema, lymphangitis, fluctuance, nor signs of infection.  No drainage present.  12 mm x 10 mm x 3 mm.  This area was debrided via excisional partial thickness debridement with a 15 scalpel blade.  Post debridement measurements were 10 mm x 8 mm x 1 mm in depth.      ASSESSMENT/PLAN     Diagnoses and all orders for this visit:    1. 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 Hammer toes of both feet    3. Onychocryptosis    4. Onychomycosis    5. Pressure injury of right foot, stage 1    6. Pressure injury of toe of right foot, stage 1    Comprehensive lower extremity examination and evaluation was performed.    Discussed findings and treatment plan including risks, benefits, and treatment options with patient in detail. Patient agreed with treatment plan.    Toenails 1 through 5 bilaterally were debrided in thickness and length with toenail tremors.  These were then smoothed with a dermal sanding bur.  Patient tolerated the procedure well.    An After Visit Summary was printed and given to the patient at discharge, including (if requested) any available informative/educational handouts regarding diagnosis, treatment, or medications. All questions were answered to patient/family satisfaction.  Should symptoms fail to improve or worsen they agree to call or return to clinic or to go to the Emergency Department. Discussed the importance of following up with any needed screening tests/labs/specialist appointments and any requested follow-up recommended by me today. Importance of maintaining follow-up discussed and patient accepts that missed appointments can delay diagnosis and potentially lead to worsening of conditions.    Return in about 9 weeks (around 9/25/2023) for Toenail Care, Ulcer Follow-Up., or sooner if acute issues arise.    This document has been electronically signed by Rian Jo DPM on July 24, 2023 13:52 EDT

## 2023-08-03 DIAGNOSIS — F98.8 ATTENTION DEFICIT DISORDER (ADD) WITHOUT HYPERACTIVITY: ICD-10-CM

## 2023-08-10 ENCOUNTER — OFFICE VISIT (OUTPATIENT)
Dept: INTERNAL MEDICINE | Facility: CLINIC | Age: 78
End: 2023-08-10
Payer: MEDICARE

## 2023-08-10 VITALS
WEIGHT: 157 LBS | TEMPERATURE: 97.8 F | OXYGEN SATURATION: 94 % | SYSTOLIC BLOOD PRESSURE: 134 MMHG | DIASTOLIC BLOOD PRESSURE: 82 MMHG | HEIGHT: 60 IN | BODY MASS INDEX: 30.82 KG/M2 | HEART RATE: 102 BPM

## 2023-08-10 DIAGNOSIS — R30.0 DYSURIA: Primary | ICD-10-CM

## 2023-08-10 DIAGNOSIS — N39.0 RECURRENT UTI (URINARY TRACT INFECTION): ICD-10-CM

## 2023-08-10 LAB
BILIRUB UR QL STRIP: NEGATIVE
CLARITY UR: CLEAR
COLOR UR: YELLOW
GLUCOSE UR STRIP-MCNC: NEGATIVE MG/DL
HGB UR QL STRIP.AUTO: NEGATIVE
KETONES UR QL STRIP: NEGATIVE
LEUKOCYTE ESTERASE UR QL STRIP.AUTO: ABNORMAL
NITRITE UR QL STRIP: POSITIVE
PH UR STRIP.AUTO: 6 [PH] (ref 5–8)
PROT UR QL STRIP: ABNORMAL
SP GR UR STRIP: 1.02 (ref 1–1.03)
UROBILINOGEN UR QL STRIP: ABNORMAL

## 2023-08-10 PROCEDURE — 1159F MED LIST DOCD IN RCRD: CPT | Performed by: INTERNAL MEDICINE

## 2023-08-10 PROCEDURE — 99212 OFFICE O/P EST SF 10 MIN: CPT | Performed by: INTERNAL MEDICINE

## 2023-08-10 PROCEDURE — 81001 URINALYSIS AUTO W/SCOPE: CPT | Performed by: INTERNAL MEDICINE

## 2023-08-10 PROCEDURE — 1160F RVW MEDS BY RX/DR IN RCRD: CPT | Performed by: INTERNAL MEDICINE

## 2023-08-10 PROCEDURE — 1170F FXNL STATUS ASSESSED: CPT | Performed by: INTERNAL MEDICINE

## 2023-08-10 PROCEDURE — 87086 URINE CULTURE/COLONY COUNT: CPT | Performed by: INTERNAL MEDICINE

## 2023-08-10 PROCEDURE — 87077 CULTURE AEROBIC IDENTIFY: CPT | Performed by: INTERNAL MEDICINE

## 2023-08-10 PROCEDURE — 87186 SC STD MICRODIL/AGAR DIL: CPT | Performed by: INTERNAL MEDICINE

## 2023-08-10 RX ORDER — CEPHALEXIN 500 MG/1
500 CAPSULE ORAL 3 TIMES DAILY
Qty: 21 CAPSULE | Refills: 0 | Status: SHIPPED | OUTPATIENT
Start: 2023-08-10

## 2023-08-10 NOTE — ASSESSMENT & PLAN NOTE
This is the indication for her 8/23 urgent visit.  She has had symptoms for several weeks, not resolving with conservative over-the-counter treatment, so patient is here for further evaluation.  She is having a little bit of back pain, may just be musculoskeletal.  She is not having any fevers nor nausea or vomiting.  We reviewed her most recent urine culture from April 2023, the organism was sensitive to cephalexin, so we will go with that at this time.  Asked patient to call Monday for the culture results or call the on-call physician over the weekend if symptoms get significantly worse.

## 2023-08-10 NOTE — PROGRESS NOTES
"Chief Complaint  Urinary Tract Infection (Pt states UTI for 2 weeks. Frequency and Urgency while voiding. No otc medication taken for symptoms. )    Subjective      Erik Bhatia presents to [facility]    History of present illness:  77 yo female, former pt of Dr Posey, who has has underlying ADD, RAD, GERD, RLS, among others.  She was seen 8/22 as a New Patient, and she is coming in now 8/23 for another urgent visit as per the chief complaint above.  We will go over her med list, review any recent labs, and make further recommendations at that time.    Review of Systems   Constitutional:  Negative for appetite change, fatigue and fever.   HENT:  Negative for congestion and ear pain.    Eyes:  Negative for blurred vision.   Respiratory:  Negative for cough, chest tightness, shortness of breath and wheezing.    Cardiovascular:  Negative for chest pain, palpitations and leg swelling.   Gastrointestinal:  Negative for abdominal pain.   Genitourinary:  Negative for difficulty urinating, dysuria and hematuria.   Musculoskeletal:  Negative for arthralgias and gait problem.   Skin:  Negative for skin lesions.   Neurological:  Negative for syncope, memory problem and confusion.   Psychiatric/Behavioral:  Negative for self-injury and depressed mood.      Objective   Vital Signs:   /82   Pulse 102   Temp 97.8 øF (36.6 øC)   Ht 152.4 cm (60\")   Wt 71.2 kg (157 lb)   SpO2 94%   BMI 30.66 kg/mý           Physical Exam  Vitals and nursing note reviewed.   Constitutional:       General: She is not in acute distress.     Appearance: Normal appearance. She is not toxic-appearing.   HENT:      Head: Atraumatic.      Right Ear: External ear normal.      Left Ear: External ear normal.      Nose: Nose normal.      Mouth/Throat:      Mouth: Mucous membranes are moist.   Eyes:      General:         Right eye: No discharge.         Left eye: No discharge.      Extraocular Movements: " Extraocular movements intact.      Pupils: Pupils are equal, round, and reactive to light.   Cardiovascular:      Rate and Rhythm: Regular rhythm. Tachycardia present.      Pulses: Normal pulses.      Heart sounds: Normal heart sounds. No murmur heard.    No gallop.      Comments: Little tachycardic, but no ectopy.  Pulmonary:      Effort: Pulmonary effort is normal. No respiratory distress.      Breath sounds: No wheezing, rhonchi or rales.      Comments: Patient with scattered rhonchi, no labored respiration.  Abdominal:      General: There is no distension.      Palpations: Abdomen is soft. There is no mass.      Tenderness: There is no abdominal tenderness. There is no guarding.   Musculoskeletal:         General: No swelling or tenderness.      Cervical back: No tenderness.      Right lower leg: No edema.      Left lower leg: No edema.      Comments: No edema.   Skin:     General: Skin is warm and dry.      Findings: No rash.   Neurological:      General: No focal deficit present.      Mental Status: She is alert and oriented to person, place, and time. Mental status is at baseline.      Motor: No weakness.      Gait: Gait normal.   Psychiatric:         Mood and Affect: Mood normal.         Thought Content: Thought content normal.        Result Review   The following data was reviewed by: Marco Antonio Leonard MD on 08/10/2022:  [x] Laboratory  [] Microbiology  [] Radiology  [] EKG/telemetry  [] Cardiology/Vascular  [] Pathology  [x] Old records             Assessment and Plan   There are no diagnoses linked to this encounter.          Follow Up   Return for Next scheduled follow up.  Patient was given instructions and counseling regarding her condition or for health maintenance advice. Please see specific information pulled into the AVS if appropriate.

## 2023-08-11 ENCOUNTER — TELEPHONE (OUTPATIENT)
Dept: INTERNAL MEDICINE | Facility: CLINIC | Age: 78
End: 2023-08-11

## 2023-08-11 LAB
BACTERIA UR QL AUTO: ABNORMAL /HPF
HYALINE CASTS UR QL AUTO: ABNORMAL /LPF
RBC # UR STRIP: ABNORMAL /HPF
REF LAB TEST METHOD: ABNORMAL
SQUAMOUS #/AREA URNS HPF: ABNORMAL /HPF
WBC # UR STRIP: ABNORMAL /HPF

## 2023-08-11 NOTE — TELEPHONE ENCOUNTER
Caller: Erik Bhatia    Relationship: Self    Best call back number:[phone]     Requested Prescriptions:   Requested Prescriptions      No prescriptions requested or ordered in this encounter    [facility]    Pharmacy where request should be sent: [facility] [id number] - [place] - [address] AT [facility] [address] - [phone] [facility] [phone]      Last office visit with prescribing clinician: 8/10/2023   Last telemedicine visit with prescribing clinician: Visit date not found   Next office visit with prescribing clinician: 10/4/2023     Additional details provided by patient: PATIENT HAS NOT HAD THIS MEDICATION FILLED DUE TO IT NOT BEING OUT OF STOCK AT HER PHARMACY. PATIENT STATED THEY NOW HAVE IT BACK IN STOCK AND SHE WOULD LIKE TO GET IT FILLED.    PATIENT TOOK LAST DOSE 8.10.2023    Does the patient have less than a 3 day supply:  [x] Yes  [] No    Would you like a call back once the refill request has been completed: [] Yes [] No    If the office needs to give you a call back, can they leave a voicemail: [] Yes [] No    Tila Sandy, PCT   08/11/23 14:00 EDT         ”

## 2023-08-12 LAB — BACTERIA SPEC AEROBE CULT: ABNORMAL

## 2023-08-14 RX ORDER — CETIRIZINE HYDROCHLORIDE, PSEUDOEPHEDRINE HYDROCHLORIDE 5; 120 MG/1; MG/1
1 TABLET, FILM COATED, EXTENDED RELEASE ORAL DAILY
Qty: 90 TABLET | Refills: 1 | Status: SHIPPED | OUTPATIENT
Start: 2023-08-14

## 2023-09-06 ENCOUNTER — TELEPHONE (OUTPATIENT)
Dept: INTERNAL MEDICINE | Facility: CLINIC | Age: 78
End: 2023-09-06
Payer: MEDICARE

## 2023-09-06 DIAGNOSIS — F98.8 ATTENTION DEFICIT DISORDER (ADD) WITHOUT HYPERACTIVITY: ICD-10-CM

## 2023-09-06 NOTE — TELEPHONE ENCOUNTER
Caller: Erik Bhatia    Relationship: Self    Best call back number: [phone]     Requested Prescriptions:   Requested Prescriptions     Pending Prescriptions Disp Refills    lisdexamfetamine (Vyvanse) 50 MG capsule 30 capsule 0     Sig: Take 1 capsule by mouth Every Morning for 30 days        Pharmacy where request should be sent: [facility] [id number]  [place] - [address] AT [facility] [address]  [phone] [facility] [phone] FX     Last office visit with prescribing clinician: 8/10/2023   Last telemedicine visit with prescribing clinician: Visit date not found   Next office visit with prescribing clinician: 10/4/2023     Additional details provided by patient: PATIENT TOOK LAST DOSE TODAY 9.6.23    Does the patient have less than a 3 day supply:  [x] Yes  [] No    Would you like a call back once the refill request has been completed: [] Yes [] No    If the office needs to give you a call back, can they leave a voicemail: [] Yes [] No    Tila Sandy, PCT   09/06/23 11:30 EDT

## 2023-09-06 NOTE — TELEPHONE ENCOUNTER
Caller: Erik Bhatia    Relationship to patient: Self    Best call back number: [phone]     Patient is needing: PATIENT IS ASKING FOR ELISABETH TO CALL HER BACK TO SEE IF SHE SHOULD BE COVID TESTED.

## 2023-09-08 NOTE — TELEPHONE ENCOUNTER
I have called pt and she states that she hadn't taken her allergy medication for a couple days, she took them and she is feeling much better, if anything changes, she will call us back.
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
## 2023-09-29 PROBLEM — L03.311 CELLULITIS OF ABDOMINAL WALL: Status: RESOLVED | Noted: 2023-06-21 | Resolved: 2023-09-29

## 2023-09-29 PROBLEM — N39.0 RECURRENT UTI (URINARY TRACT INFECTION): Status: RESOLVED | Noted: 2023-08-10 | Resolved: 2023-09-29

## 2023-09-29 PROBLEM — S30.861A INSECT BITE OF ABDOMINAL WALL: Status: RESOLVED | Noted: 2023-06-21 | Resolved: 2023-09-29

## 2023-09-29 NOTE — PROGRESS NOTES
"Chief Complaint  Heartburn, Follow-up (Pt states that this is routine, she had labs. ), and legs cramping (She states that her legs have been cramping, but she feels that if she walks her dog they do better on those days. )    Symone      Kathleenncroly Bhatia presents to [facility]    History of present illness:  77 yo female, former pt of Dr Posey, who has has underlying ADD, RAD, GERD, RLS, among others.  She was seen 8/22 as a New Patient, who is coming in now 10/23 for routine 6-month follow-up.  We will go over her med list, review any recent labs, and make further recommendations at that time.    Review of Systems   Constitutional:  Negative for appetite change, fatigue and fever.   HENT:  Negative for congestion and ear pain.    Eyes:  Negative for blurred vision.   Respiratory:  Negative for cough, chest tightness, shortness of breath and wheezing.    Cardiovascular:  Negative for chest pain, palpitations and leg swelling.   Gastrointestinal:  Negative for abdominal pain.   Genitourinary:  Negative for difficulty urinating, dysuria and hematuria.   Musculoskeletal:  Negative for arthralgias and gait problem.   Skin:  Negative for skin lesions.   Neurological:  Negative for syncope, memory problem and confusion.   Psychiatric/Behavioral:  Negative for self-injury and depressed mood.      Objective   Vital Signs:   /70   Pulse 95   Temp 96.6 °F (35.9 °C) (Skin)   Ht 152.4 cm (60\")   Wt 70.3 kg (155 lb)   SpO2 96%   BMI 30.27 kg/m²           Physical Exam  Vitals and nursing note reviewed.   Constitutional:       General: She is not in acute distress.     Appearance: Normal appearance. She is not toxic-appearing.   HENT:      Head: Atraumatic.      Right Ear: External ear normal.      Left Ear: External ear normal.      Nose: Nose normal.      Mouth/Throat:      Mouth: Mucous membranes are moist.   Eyes:      General:         Right eye: No discharge.         Left " eye: No discharge.      Extraocular Movements: Extraocular movements intact.      Pupils: Pupils are equal, round, and reactive to light.   Cardiovascular:      Rate and Rhythm: Regular rhythm. Tachycardia present.      Pulses: Normal pulses.      Heart sounds: Normal heart sounds. No murmur heard.    No gallop.      Comments: Little tachycardic, but no ectopy.  Pulmonary:      Effort: Pulmonary effort is normal. No respiratory distress.      Breath sounds: No wheezing, rhonchi or rales.      Comments: Patient with scattered rhonchi, no labored respiration.  Abdominal:      General: There is no distension.      Palpations: Abdomen is soft. There is no mass.      Tenderness: There is no abdominal tenderness. There is no guarding.   Musculoskeletal:         General: No swelling or tenderness.      Cervical back: No tenderness.      Right lower leg: No edema.      Left lower leg: No edema.      Comments: No edema.   Skin:     General: Skin is warm and dry.      Findings: No rash.   Neurological:      General: No focal deficit present.      Mental Status: She is alert and oriented to person, place, and time. Mental status is at baseline.      Motor: No weakness.      Gait: Gait normal.   Psychiatric:         Mood and Affect: Mood normal.         Thought Content: Thought content normal.        Result Review   The following data was reviewed by: Marco Antonio Leonard MD on 08/10/2022:  [x] Laboratory  [] Microbiology  [] Radiology  [] EKG/telemetry  [] Cardiology/Vascular  [] Pathology  [x] Old records             Assessment and Plan   Diagnoses and all orders for this visit:    1. 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      2. Moderate persistent asthma without complication  Assessment & Plan:  Patient fairly stable this regards as of  her 10/23 office visit.  She is on twice daily Symbicort along with Singulair and antihistamine.  She just got her flu shot, hopefully she will have a quiet Fall season this regards.      3. Mixed hyperlipidemia  Assessment & Plan:  LDL is holding at 115 as of her 10/23 office visit.  HDL is still elevated around 70.  As noted prior, this is primary prevention, so we are monitoring it without statin therapy unless she develops other risk factors.    Orders:  -     Comprehensive Metabolic Panel; Future  -     Lipid Panel; Future    4. Gastroesophageal reflux disease without esophagitis  Overview:  Patient failed trying to gradually transition from Prilosec to Pepcid.    Assessment & Plan:  Patient no dysphagia, no significant dyspepsia on chronic PPI 10/23.  Patient is stable to continue with just low-dose Prilosec.        5. Hypokalemia  -     Magnesium; Future  -     Magnesium; Future    6. Need for vaccination  -     Fluzone High-Dose 65+yrs ([id number])    7. Iron deficiency anemia due to dietary causes  Assessment & Plan:  Hemoglobin is stable at 12.0 as of her 10/23 OV.  Her iron studies are normal as well, she is intolerant to oral iron, she received IV iron in the past, certainly none needed presently.    Orders:  -     CBC & Differential; Future  -     Ferritin; Future  -     Iron Profile; Future    8. Well adult exam  -     Hemoglobin A1c; Future    9. Vitamin D deficiency  -     Vitamin D,25-Hydroxy; Future    10. Vitamin B12 deficiency  -     Vitamin B12 anemia; Future  -     Folate anemia; Future    Other orders  -     cetirizine-pseudoephedrine (ZyrTEC-D) 5-120 MG per 12 hr tablet; Take 1 tablet by mouth Daily.  Dispense: 90 tablet; Refill: 1              Follow Up   Return in about 6 months (around 4/4/2024).  Patient was given instructions and counseling regarding her condition or for health maintenance advice. Please see specific information pulled into the AVS if appropriate.

## 2023-10-02 ENCOUNTER — LAB (OUTPATIENT)
Dept: LAB | Facility: HOSPITAL | Age: 78
End: 2023-10-02
Payer: MEDICARE

## 2023-10-02 DIAGNOSIS — E55.9 VITAMIN D DEFICIENCY: ICD-10-CM

## 2023-10-02 DIAGNOSIS — D50.8 IRON DEFICIENCY ANEMIA DUE TO DIETARY CAUSES: ICD-10-CM

## 2023-10-02 DIAGNOSIS — E87.6 HYPOKALEMIA: ICD-10-CM

## 2023-10-02 DIAGNOSIS — E78.2 MIXED HYPERLIPIDEMIA: ICD-10-CM

## 2023-10-02 DIAGNOSIS — Z00.00 MEDICARE ANNUAL WELLNESS VISIT, SUBSEQUENT: ICD-10-CM

## 2023-10-02 LAB
25(OH)D3 SERPL-MCNC: 25 NG/ML (ref 30–100)
ALBUMIN SERPL-MCNC: 4.1 G/DL (ref 3.5–5.2)
ALBUMIN/GLOB SERPL: 1.2 G/DL
ALP SERPL-CCNC: 104 U/L (ref 39–117)
ALT SERPL W P-5'-P-CCNC: 20 U/L (ref 1–33)
ANION GAP SERPL CALCULATED.3IONS-SCNC: 9 MMOL/L (ref 5–15)
AST SERPL-CCNC: 23 U/L (ref 1–32)
BASOPHILS # BLD AUTO: 0.07 10*3/MM3 (ref 0–0.2)
BASOPHILS NFR BLD AUTO: 1.2 % (ref 0–1.5)
BILIRUB SERPL-MCNC: 0.4 MG/DL (ref 0–1.2)
BUN SERPL-MCNC: 16 MG/DL (ref 8–23)
BUN/CREAT SERPL: 22.9 (ref 7–25)
CALCIUM SPEC-SCNC: 9.7 MG/DL (ref 8.6–10.5)
CHLORIDE SERPL-SCNC: 104 MMOL/L (ref 98–107)
CHOLEST SERPL-MCNC: 196 MG/DL (ref 0–200)
CO2 SERPL-SCNC: 27 MMOL/L (ref 22–29)
CREAT SERPL-MCNC: 0.7 MG/DL (ref 0.57–1)
DEPRECATED RDW RBC AUTO: 38.8 FL (ref 37–54)
EGFRCR SERPLBLD CKD-EPI 2021: 88.7 ML/MIN/1.73
EOSINOPHIL # BLD AUTO: 0.37 10*3/MM3 (ref 0–0.4)
EOSINOPHIL NFR BLD AUTO: 6.5 % (ref 0.3–6.2)
ERYTHROCYTE [DISTWIDTH] IN BLOOD BY AUTOMATED COUNT: 12.1 % (ref 12.3–15.4)
FERRITIN SERPL-MCNC: 108 NG/ML (ref 13–150)
GLOBULIN UR ELPH-MCNC: 3.3 GM/DL
GLUCOSE SERPL-MCNC: 93 MG/DL (ref 65–99)
HCT VFR BLD AUTO: 36.4 % (ref 34–46.6)
HDLC SERPL-MCNC: 70 MG/DL (ref 40–60)
HGB BLD-MCNC: 12 G/DL (ref 12–15.9)
IMM GRANULOCYTES # BLD AUTO: 0.01 10*3/MM3 (ref 0–0.05)
IMM GRANULOCYTES NFR BLD AUTO: 0.2 % (ref 0–0.5)
IRON 24H UR-MRATE: 90 MCG/DL (ref 37–145)
IRON SATN MFR SERPL: 24 % (ref 20–50)
LDLC SERPL CALC-MCNC: 115 MG/DL (ref 0–100)
LDLC/HDLC SERPL: 1.63 {RATIO}
LYMPHOCYTES # BLD AUTO: 2.11 10*3/MM3 (ref 0.7–3.1)
LYMPHOCYTES NFR BLD AUTO: 37 % (ref 19.6–45.3)
MCH RBC QN AUTO: 29.1 PG (ref 26.6–33)
MCHC RBC AUTO-ENTMCNC: 33 G/DL (ref 31.5–35.7)
MCV RBC AUTO: 88.1 FL (ref 79–97)
MONOCYTES # BLD AUTO: 0.59 10*3/MM3 (ref 0.1–0.9)
MONOCYTES NFR BLD AUTO: 10.3 % (ref 5–12)
NEUTROPHILS NFR BLD AUTO: 2.56 10*3/MM3 (ref 1.7–7)
NEUTROPHILS NFR BLD AUTO: 44.8 % (ref 42.7–76)
NRBC BLD AUTO-RTO: 0 /100 WBC (ref 0–0.2)
PLATELET # BLD AUTO: 246 10*3/MM3 (ref 140–450)
PMV BLD AUTO: 10.7 FL (ref 6–12)
POTASSIUM SERPL-SCNC: 3.6 MMOL/L (ref 3.5–5.2)
PROT SERPL-MCNC: 7.4 G/DL (ref 6–8.5)
RBC # BLD AUTO: 4.13 10*6/MM3 (ref 3.77–5.28)
SODIUM SERPL-SCNC: 140 MMOL/L (ref 136–145)
TIBC SERPL-MCNC: 375 MCG/DL (ref 298–536)
TRANSFERRIN SERPL-MCNC: 252 MG/DL (ref 200–360)
TRIGL SERPL-MCNC: 60 MG/DL (ref 0–150)
TSH SERPL DL<=0.05 MIU/L-ACNC: 1.99 UIU/ML (ref 0.27–4.2)
VLDLC SERPL-MCNC: 11 MG/DL (ref 5–40)
WBC NRBC COR # BLD: 5.71 10*3/MM3 (ref 3.4–10.8)

## 2023-10-02 PROCEDURE — 84443 ASSAY THYROID STIM HORMONE: CPT

## 2023-10-02 PROCEDURE — 85025 COMPLETE CBC W/AUTO DIFF WBC: CPT

## 2023-10-02 PROCEDURE — 82728 ASSAY OF FERRITIN: CPT

## 2023-10-02 PROCEDURE — 80053 COMPREHEN METABOLIC PANEL: CPT

## 2023-10-02 PROCEDURE — 83540 ASSAY OF IRON: CPT

## 2023-10-02 PROCEDURE — 80061 LIPID PANEL: CPT

## 2023-10-02 PROCEDURE — 84466 ASSAY OF TRANSFERRIN: CPT

## 2023-10-02 PROCEDURE — 36415 COLL VENOUS BLD VENIPUNCTURE: CPT

## 2023-10-02 PROCEDURE — 83735 ASSAY OF MAGNESIUM: CPT

## 2023-10-02 PROCEDURE — 82306 VITAMIN D 25 HYDROXY: CPT

## 2023-10-04 ENCOUNTER — OFFICE VISIT (OUTPATIENT)
Dept: INTERNAL MEDICINE | Facility: CLINIC | Age: 78
End: 2023-10-04
Payer: MEDICARE

## 2023-10-04 VITALS
OXYGEN SATURATION: 96 % | WEIGHT: 155 LBS | HEART RATE: 95 BPM | TEMPERATURE: 96.6 F | HEIGHT: 60 IN | BODY MASS INDEX: 30.43 KG/M2 | SYSTOLIC BLOOD PRESSURE: 144 MMHG | DIASTOLIC BLOOD PRESSURE: 70 MMHG

## 2023-10-04 DIAGNOSIS — E78.2 MIXED HYPERLIPIDEMIA: ICD-10-CM

## 2023-10-04 DIAGNOSIS — D50.8 IRON DEFICIENCY ANEMIA DUE TO DIETARY CAUSES: ICD-10-CM

## 2023-10-04 DIAGNOSIS — E87.6 HYPOKALEMIA: ICD-10-CM

## 2023-10-04 DIAGNOSIS — K21.9 GASTROESOPHAGEAL REFLUX DISEASE WITHOUT ESOPHAGITIS: ICD-10-CM

## 2023-10-04 DIAGNOSIS — E53.8 VITAMIN B12 DEFICIENCY: ICD-10-CM

## 2023-10-04 DIAGNOSIS — F98.8 ATTENTION DEFICIT DISORDER (ADD) WITHOUT HYPERACTIVITY: Primary | ICD-10-CM

## 2023-10-04 DIAGNOSIS — Z23 NEED FOR VACCINATION: ICD-10-CM

## 2023-10-04 DIAGNOSIS — Z00.00 WELL ADULT EXAM: ICD-10-CM

## 2023-10-04 DIAGNOSIS — E55.9 VITAMIN D DEFICIENCY: ICD-10-CM

## 2023-10-04 DIAGNOSIS — J45.40 MODERATE PERSISTENT ASTHMA WITHOUT COMPLICATION: ICD-10-CM

## 2023-10-04 LAB — MAGNESIUM SERPL-MCNC: 2.4 MG/DL (ref 1.6–2.4)

## 2023-10-04 RX ORDER — CETIRIZINE HYDROCHLORIDE, PSEUDOEPHEDRINE HYDROCHLORIDE 5; 120 MG/1; MG/1
1 TABLET, FILM COATED, EXTENDED RELEASE ORAL DAILY
Qty: 90 TABLET | Refills: 1 | Status: SHIPPED | OUTPATIENT
Start: 2023-10-04

## 2023-10-04 NOTE — ASSESSMENT & PLAN NOTE
Hemoglobin is stable at 12.0 as of her 10/23 OV.  Her iron studies are normal as well, she is intolerant to oral iron, she received IV iron in the past, certainly none needed presently.

## 2023-10-04 NOTE — ASSESSMENT & PLAN NOTE
Patient continues to tolerate Vyvanse without any side effects, no palpitations no hypertension, so stable to continue current dosing as of 10/23 OV.

## 2023-10-04 NOTE — ASSESSMENT & PLAN NOTE
Patient fairly stable this regards as of her 10/23 office visit.  She is on twice daily Symbicort along with Singulair and antihistamine.  She just got her flu shot, hopefully she will have a quiet Fall season this regards.

## 2023-10-04 NOTE — ASSESSMENT & PLAN NOTE
Patient no dysphagia, no significant dyspepsia on chronic PPI 10/23.  Patient is stable to continue with just low-dose Prilosec.

## 2023-10-04 NOTE — ASSESSMENT & PLAN NOTE
LDL is holding at 115 as of her 10/23 office visit.  HDL is still elevated around 70.  As noted prior, this is primary prevention, so we are monitoring it without statin therapy unless she develops other risk factors.

## 2023-10-11 DIAGNOSIS — F98.8 ATTENTION DEFICIT DISORDER (ADD) WITHOUT HYPERACTIVITY: ICD-10-CM

## 2023-10-11 RX ORDER — LISDEXAMFETAMINE DIMESYLATE CAPSULES 50 MG/1
50 CAPSULE ORAL EVERY MORNING
Qty: 30 CAPSULE | Refills: 0 | Status: SHIPPED | OUTPATIENT
Start: 2023-10-11 | End: 2023-11-10

## 2023-10-11 NOTE — TELEPHONE ENCOUNTER
Caller: Erik Bhatia    Relationship: Self    Best call back number    [phone]        Requested Prescriptions:   Requested Prescriptions     Pending Prescriptions Disp Refills    lisdexamfetamine (Vyvanse) 50 MG capsule 30 capsule 0     Sig: Take 1 capsule by mouth Every Morning for 30 days        Pharmacy where request should be sent: [facility] [id number] [place] - [address] AT [facility] [address]  [phone] [facility] [phone] FX     Last office visit with prescribing clinician: 10/4/2023   Last telemedicine visit with prescribing clinician: Visit date not found   Next office visit with prescribing clinician: 4/9/2024     Additional details provided by patient: PATIENT IS OUT OF MEDICATION    Does the patient have less than a 3 day supply:  [x] Yes  [] No    Would you like a call back once the refill request has been completed: [] Yes [] No    If the office needs to give you a call back, can they leave a voicemail: [] Yes [] No    Rosanna Smith Rep   10/11/23 13:06 EDT

## 2023-10-16 ENCOUNTER — OFFICE VISIT (OUTPATIENT)
Dept: PODIATRY | Facility: CLINIC | Age: 78
End: 2023-10-16
Payer: MEDICARE

## 2023-10-16 VITALS
BODY MASS INDEX: 29.49 KG/M2 | SYSTOLIC BLOOD PRESSURE: 133 MMHG | HEART RATE: 109 BPM | OXYGEN SATURATION: 95 % | TEMPERATURE: 98.4 F | WEIGHT: 151 LBS | DIASTOLIC BLOOD PRESSURE: 85 MMHG

## 2023-10-16 DIAGNOSIS — L60.0 ONYCHOCRYPTOSIS: ICD-10-CM

## 2023-10-16 DIAGNOSIS — M20.41 HAMMER TOES OF BOTH FEET: ICD-10-CM

## 2023-10-16 DIAGNOSIS — L89.891 PRESSURE INJURY OF RIGHT FOOT, STAGE 1: ICD-10-CM

## 2023-10-16 DIAGNOSIS — M20.12 VALGUS DEFORMITY OF BOTH GREAT TOES: Primary | ICD-10-CM

## 2023-10-16 DIAGNOSIS — M79.671 FOOT PAIN, BILATERAL: ICD-10-CM

## 2023-10-16 DIAGNOSIS — L89.891 PRESSURE INJURY OF TOE OF RIGHT FOOT, STAGE 1: ICD-10-CM

## 2023-10-16 DIAGNOSIS — B35.1 ONYCHOMYCOSIS: ICD-10-CM

## 2023-10-16 DIAGNOSIS — M20.11 VALGUS DEFORMITY OF BOTH GREAT TOES: Primary | ICD-10-CM

## 2023-10-16 DIAGNOSIS — M79.672 FOOT PAIN, BILATERAL: ICD-10-CM

## 2023-10-16 DIAGNOSIS — M20.42 HAMMER TOES OF BOTH FEET: ICD-10-CM

## 2023-10-16 PROCEDURE — 1159F MED LIST DOCD IN RCRD: CPT | Performed by: PODIATRIST

## 2023-10-16 PROCEDURE — 1160F RVW MEDS BY RX/DR IN RCRD: CPT | Performed by: PODIATRIST

## 2023-10-16 PROCEDURE — 97597 DBRDMT OPN WND 1ST 20 CM/<: CPT | Performed by: PODIATRIST

## 2023-10-16 PROCEDURE — 11721 DEBRIDE NAIL 6 OR MORE: CPT | Performed by: PODIATRIST

## 2023-10-16 NOTE — PROGRESS NOTES
[facility] - PODIATRY    Today's Date: 10/16/23    Patient Name: Erik Bhatia  MRN: [id number]  CSN: [id number]  PCP: Marco Antonio Leonard MD, last PCP visit: 10/4/2023  Referring Provider: No ref. provider found    SUBJECTIVE     Chief Complaint   Patient presents with    Left Foot - Follow-up, Nail Problem    Right Foot - Follow-up, Nail Problem     HPI: Erik Bhatia, a 78 y.o.female, comes presents to clinic with chief complaint of:    New, Established, New Problem: Established    Location:  hyperkeratotic lesion(s) on dorsal right second proximal interphalangeal joint    Duration: 2015    Onset:  gradual    Nature:  sore    Stable, worsening, improving: recurring    Aggravating factors:  Patient reports lesion(s) is painful with shoegear and ambulation.      Previous Treatment:   Debridement  ---------------------------  New, Established, New Problem: est    Location: Toenails    Duration:  Greater than five years    Onset: Gradual    Nature: sore with palpation.    Stable, worsening, improving: stable    Aggravating factors: Pain with shoe gear and ambulation.    Previous Treatment:  Debridement    Medical changes:  none.    Patient denies any fevers, chills, nausea, vomiting, shortness of breathe, nor any other constitutional signs nor symptoms.    Past Medical History:   Diagnosis Date    ADHD (attention deficit hyperactivity disorder) about 2000    Allergic ?    Anemia ?    Arthritis     Asthma     Bladder disorder     Bunion     Callus ?    Chronic allergic rhinitis     Corns and callus     GERD (gastroesophageal reflux disease)     Hammertoe     Ingrown toenail     Limb pain     Limb swelling     Numbness in feet     SOB (shortness of breath)      Past Surgical History:   Procedure Laterality Date    BREAST BIOPSY      CARPAL TUNNEL RELEASE      COLONOSCOPY  2020 and before    ENDOSCOPY      TRIGGER FINGER RELEASE       Family History   Problem Relation Age of Onset    Stomach cancer Father      Diabetes Father     Asthma Father     Cancer Father         stomach    Heart disease Mother     Diabetes Maternal Grandfather     Breast cancer Neg Hx     Ovarian cancer Neg Hx     Uterine cancer Neg Hx     Colon cancer Neg Hx      Social History     Socioeconomic History    Marital status:    Tobacco Use    Smoking status: Never     Passive exposure: Never    Smokeless tobacco: Never    Tobacco comments:     none   Vaping Use    Vaping Use: Never used   Substance and Sexual Activity    Alcohol use: Yes     Alcohol/week: 3.0 - 4.0 standard drinks of alcohol     Types: 3 - 4 Glasses of wine per week     Comment: Not every week    Drug use: Never    Sexual activity: Not Currently     Partners: Male     Birth control/protection: Pill, I.U.D.     Comment: Birth control previously but listed above     Allergies   Allergen Reactions    Seasonal Ic [Kelp-B6-Lecithin-Vinegar] Cough    Nitrofurantoin Provider Review Needed     Current Outpatient Medications   Medication Sig Dispense Refill    aluminum hydroxide-magnesium carbonate (Gaviscon)  MG/15ML suspension oral suspension 1 mL.      budesonide-formoterol (SYMBICORT) 160-4.5 MCG/ACT inhaler Inhale 2 puffs 2 (Two) Times a Day. 10.2 g 1    cetirizine-pseudoephedrine (ZyrTEC-D) 5-120 MG per 12 hr tablet Take 1 tablet by mouth Daily. 90 tablet 1    Cholecalciferol 125 MCG (5000 UT) tablet 1 tablet Daily.      Diclofenac Sodium (VOLTAREN) 1 % gel gel Apply 4 g topically to the appropriate area as directed 4 (Four) Times a Day As Needed (right shoulder pain). 150 g 1    estradiol (ESTRACE) 0.1 MG/GM vaginal cream Insert 1 g into the vagina 2 (Two) Times a Week. 42.5 g 3    fluticasone (Flonase) 50 MCG/ACT nasal spray 2 sprays into the nostril(s) as directed by provider Daily. 45 mL 3    lisdexamfetamine (Vyvanse) 50 MG capsule Take 1 capsule by mouth Every Morning for 30 days 30 capsule 0    montelukast (SINGULAIR) 10 MG tablet Take 1 tablet by mouth Daily. 90  tablet 3    olopatadine (PATADAY) 0.2 % solution ophthalmic solution 1 drop.      omeprazole (priLOSEC) 20 MG capsule Take 1 capsule by mouth Daily. 90 capsule 3    potassium chloride (MICRO-K) 10 MEQ CR capsule Take 2 capsules by mouth Daily. 180 capsule 3    silver sulfadiazine (Silvadene) 1 % cream Apply 1 application topically to the appropriate area as directed 2 (Two) Times a Day. 1 each 5    triamcinolone (KENALOG) 0.1 % ointment Apply  topically to the appropriate area as directed 3 (Three) Times a Day. 30 g 1     No current facility-administered medications for this visit.     Review of Systems   Constitutional: Negative.    Skin:         Painful hyperkeratotic lesion and toenails   All other systems reviewed and are negative.      OBJECTIVE     Vitals:    10/16/23 1340   BP: 133/85   Pulse: 109   Temp: 98.4 °F (36.9 °C)   SpO2: 95%         Body mass index is 29.49 kg/m².    Lab Results   Component Value Date    GLUCOSE 93 10/02/2023    CALCIUM 9.7 10/02/2023     10/02/2023    K 3.6 10/02/2023    CO2 27.0 10/02/2023     10/02/2023    BUN 16 10/02/2023    CREATININE 0.70 10/02/2023    EGFRIFNONA 79 09/10/2021    BCR 22.9 10/02/2023    ANIONGAP 9.0 10/02/2023       Patient seen in no apparent distress.      PHYSICAL EXAM:     Foot/Ankle Exam    GENERAL  Diabetic foot exam performed    Appearance:  appears stated age and elderly  Orientation:  AAOx3  Affect:  appropriate  Gait:  unimpaired  Assistance:  independent  Right shoe gear: casual shoe  Left shoe gear: casual shoe    VASCULAR     Right Foot Vascularity   Normal vascular exam    Dorsalis pedis:  2+  Posterior tibial:  2+  Skin temperature:  warm  Edema grading:  None  CFT:  < 3 seconds  Pedal hair growth:  Present  Varicosities:  none     Left Foot Vascularity   Normal vascular exam    Dorsalis pedis:  2+  Posterior tibial:  2+  Skin temperature:  warm  Edema grading:  None  CFT:  < 3 seconds  Pedal hair growth:  Present  Varicosities:   none     NEUROLOGIC     Right Foot Neurologic   Normal sensation    Light touch sensation: normal  Vibratory sensation: normal  Hot/Cold sensation: normal  Protective Sensation using [place] Monofilament:   Sites intact: 10  Sites tested: 10     Left Foot Neurologic   Normal sensation    Light touch sensation: normal  Vibratory sensation: normal  Hot/Cold sensation:  normal  Protective Sensation using [place]ush Monofilament:   Sites intact: 10  Sites tested: 10    MUSCULOSKELETAL     Right Foot Musculoskeletal   Hammertoe:  Second toe, third toe, fourth toe and fifth toe  Hallux valgus: Yes       Left Foot Musculoskeletal   Hammertoe:  Second toe, third toe, fourth toe and fifth toe  Hallux valgus: Yes      MUSCLE STRENGTH     Right Foot Muscle Strength   Foot dorsiflexion:  4  Foot plantar flexion:  4  Foot inversion:  4  Foot eversion:  4     Left Foot Muscle Strength   Foot dorsiflexion:  4  Foot plantar flexion:  4  Foot inversion:  4  Foot eversion:  4    RANGE OF MOTION     Right Foot Range of Motion   Foot and ankle ROM within normal limits       Left Foot Range of Motion   Foot and ankle ROM within normal limits      DERMATOLOGIC      Right Foot Dermatologic   Skin  Positive for ulcer.   Nails  1.  Positive for elongated, onychomycosis, abnormal thickness, subungual debris and ingrown toenail.  2.  Positive for elongated, onychomycosis, abnormal thickness, subungual debris and ingrown toenail.  3.  Positive for elongated, onychomycosis, abnormal thickness, subungual debris and ingrown toenail.  4.  Positive for elongated, onychomycosis, abnormal thickness, subungual debris and ingrown toenail.  5.  Positive for elongated, onychomycosis, abnormal thickness, subungual debris and ingrown toenail.     Left Foot Dermatologic   Skin  Left foot skin is intact.   Nails  1.  Positive for elongated, onychomycosis, abnormal thickness, subungual debris and ingrown toenail.  2.  Positive for elongated,  onychomycosis, abnormal thickness, subungual debris and ingrown toenail.  3.  Positive for elongated, onychomycosis, abnormal thickness, subungual debris and ingrown toenail.  4.  Positive for elongated, onychomycosis, abnormally thick, subungual debris and ingrown toenail.  5.  Positive for elongated, onychomycosis, abnormally thick, subungual debris and ingrown toenail.     Right foot additional comments: Stage 1 ulceration on the dorsal right second PIP joint area of the foot.  Hyperkeratotic tissue with subepidermal hemosiderin deposition is present.  No surrounding, edema, erythema, lymphangitis, fluctuance, nor signs of infection.  No drainage present.  14 mm x 11 mm x 3 mm.  This area was debrided via excisional partial thickness debridement with a 15 scalpel blade.  Post debridement measurements were 11 mm x 8 mm x 1 mm in depth.        ASSESSMENT/PLAN     Diagnoses and all orders for this visit:    1. 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 Onychomycosis    3. Hammer toes of both feet    4. Onychocryptosis    5. Pressure injury of toe of right foot, stage 1    6. Pressure injury of right foot, stage 1    7. Foot pain, bilateral    Comprehensive lower extremity examination and evaluation was performed.    Discussed findings and treatment plan including risks, benefits, and treatment options with patient in detail. Patient agreed with treatment plan.    Toenails 1 through 5 bilaterally were debrided in thickness and length with toenail tremors.  These were then smoothed with a dermal sanding bur.  Patient tolerated the procedure well.    An After Visit Summary was printed and given to the patient at discharge, including (if requested) any available informative/educational handouts regarding diagnosis, treatment, or medications. All questions were answered to patient/family satisfaction. Should symptoms fail to improve or worsen they agree to call or return to clinic or to go to the Emergency  Department. Discussed the importance of following up with any needed screening tests/labs/specialist appointments and any requested follow-up recommended by me today. Importance of maintaining follow-up discussed and patient accepts that missed appointments can delay diagnosis and potentially lead to worsening of conditions.    Return in about 9 weeks (around 12/18/2023) for Toenail Care., or sooner if acute issues arise.    This document has been electronically signed by Rian Jo DPM on October 16, 2023 13:53 EDT

## 2023-10-19 ENCOUNTER — TELEPHONE (OUTPATIENT)
Dept: INTERNAL MEDICINE | Facility: CLINIC | Age: 78
End: 2023-10-19
Payer: MEDICARE

## 2023-10-19 RX ORDER — CETIRIZINE HYDROCHLORIDE, PSEUDOEPHEDRINE HYDROCHLORIDE 5; 120 MG/1; MG/1
2 TABLET, FILM COATED, EXTENDED RELEASE ORAL DAILY
Qty: 180 TABLET | Refills: 1 | Status: SHIPPED | OUTPATIENT
Start: 2023-10-19

## 2023-10-19 NOTE — TELEPHONE ENCOUNTER
Caller: Erik Bhatia    Relationship: Self    Best call back number: [phone]    Requested Prescriptions:   Requested Prescriptions     Pending Prescriptions Disp Refills    cetirizine-pseudoephedrine (ZyrTEC-D) 5-120 MG per 12 hr tablet 90 tablet 1     Sig: Take 1 tablet by mouth Daily.        Pharmacy where request should be sent: [facility] [id number]  [place] - [address] AT [facility] [address]  [phone] [facility] [phone] FX     Last office visit with prescribing clinician: 10/4/2023   Last telemedicine visit with prescribing clinician: Visit date not found   Next office visit with prescribing clinician: 4/9/2024     Additional details provided by patient: PATIENT STATED THIS MEDICATIONS DIRECTIONS ARE WRONG. SHE TAKES TWO TABLETS A DAY NOT ONE. SHE WOULD LIKE THIS CHANGED SO SHE CAN GET THE RIGHT AMOUNT OF MEDICATION FOR REFILLS.     Does the patient have less than a 3 day supply:  [x] Yes  [] No      Rosanna Frias Rep   10/19/23 12:45 EDT

## 2023-11-07 DIAGNOSIS — M19.90 ARTHRITIS: ICD-10-CM

## 2023-11-08 RX ORDER — POTASSIUM CHLORIDE 750 MG/1
20 CAPSULE, EXTENDED RELEASE ORAL DAILY
Qty: 180 CAPSULE | Refills: 3 | Status: SHIPPED | OUTPATIENT
Start: 2023-11-08

## 2023-11-10 DIAGNOSIS — F98.8 ATTENTION DEFICIT DISORDER (ADD) WITHOUT HYPERACTIVITY: ICD-10-CM

## 2023-11-10 NOTE — TELEPHONE ENCOUNTER
Caller: Erik Bhatia    Relationship: Self    Best call back number: [phone]     Requested Prescriptions:   Requested Prescriptions     Pending Prescriptions Disp Refills    lisdexamfetamine (Vyvanse) 50 MG capsule 30 capsule 0     Sig: Take 1 capsule by mouth Every Morning for 30 days        Pharmacy where request should be sent: [facility] [id number]  [facility] [place] [facility] [address] [facility] [address]  [phone] [facility] [phone] FX     Last office visit with prescribing clinician: 10/4/2023   Last telemedicine visit with prescribing clinician: Visit date not found   Next office visit with prescribing clinician: 4/9/2024     Additional details provided by patient:     Does the patient have less than a 3 day supply:  [x] Yes  [] No    Would you like a call back once the refill request has been completed: [] Yes [] No    If the office needs to give you a call back, can they leave a voicemail: [] Yes [] No    Rosanna Poon Rep   11/10/23 13:41 EST

## 2023-11-12 RX ORDER — LISDEXAMFETAMINE DIMESYLATE CAPSULES 50 MG/1
50 CAPSULE ORAL EVERY MORNING
Qty: 30 CAPSULE | Refills: 0 | Status: SHIPPED | OUTPATIENT
Start: 2023-11-12 | End: 2023-12-12

## 2023-12-06 RX ORDER — BUDESONIDE AND FORMOTEROL FUMARATE DIHYDRATE 160; 4.5 UG/1; UG/1
2 AEROSOL RESPIRATORY (INHALATION) 2 TIMES DAILY
Qty: 10.2 G | Refills: 1 | Status: SHIPPED | OUTPATIENT
Start: 2023-12-06

## 2023-12-13 ENCOUNTER — OFFICE VISIT (OUTPATIENT)
Dept: INTERNAL MEDICINE | Facility: CLINIC | Age: 78
End: 2023-12-13
Payer: MEDICARE

## 2023-12-13 VITALS
TEMPERATURE: 99.1 F | BODY MASS INDEX: 30.27 KG/M2 | WEIGHT: 155 LBS | SYSTOLIC BLOOD PRESSURE: 132 MMHG | OXYGEN SATURATION: 96 % | DIASTOLIC BLOOD PRESSURE: 74 MMHG | HEART RATE: 104 BPM

## 2023-12-13 DIAGNOSIS — J45.20 MILD INTERMITTENT ASTHMA WITHOUT COMPLICATION: ICD-10-CM

## 2023-12-13 DIAGNOSIS — R05.1 ACUTE COUGH: Primary | ICD-10-CM

## 2023-12-13 LAB
EXPIRATION DATE: NORMAL
FLUAV AG UPPER RESP QL IA.RAPID: NOT DETECTED
FLUBV AG UPPER RESP QL IA.RAPID: NOT DETECTED
INTERNAL CONTROL: NORMAL
Lab: NORMAL
SARS-COV-2 AG UPPER RESP QL IA.RAPID: NOT DETECTED

## 2023-12-13 RX ORDER — PREDNISONE 20 MG/1
TABLET ORAL
Qty: 11 TABLET | Refills: 0 | Status: SHIPPED | OUTPATIENT
Start: 2023-12-13

## 2023-12-13 RX ORDER — ALBUTEROL SULFATE 90 UG/1
2 AEROSOL, METERED RESPIRATORY (INHALATION) EVERY 4 HOURS PRN
Qty: 18 G | Refills: 1 | Status: SHIPPED | OUTPATIENT
Start: 2023-12-13

## 2023-12-13 NOTE — ASSESSMENT & PLAN NOTE
Patient appears stable this regards as of her 12/23 urgent visit.  She has upper story infection.  No significant bronchospasm, sats are fine as noted.  She stable to continue with moderate dose Symbicort as well as Singulair and Zyrtec-D.  Of not she has a rescue inhaler available but has not felt the need to use it.

## 2023-12-13 NOTE — PROGRESS NOTES
Chief Complaint  Cough and Sore Throat (Pt reports in office for a sore throat and cough. Symptoms started roughly 2wks ago. Pt voices no knowledge of being in contact with a COVID+ individual.)    Symone Wangncroly Bhatia presents to [facility]    History of Present Illness:  79 yo female, former pt of Dr Posey, who has has underlying ADD, RAD, GERD, RLS, among others.  She was seen 8/22 as a New Patient, who is coming in now 10/23 for routine 6-month follow-up.  We will go over her med list, review any recent labs, and make further recommendations at that time.---> here 12/23 for urgent issue per CC above.    Review of Systems   Constitutional:  Negative for appetite change, fatigue and fever.   HENT:  Negative for congestion and ear pain.    Eyes:  Negative for blurred vision.   Respiratory:  Negative for cough, chest tightness, shortness of breath and wheezing.    Cardiovascular:  Negative for chest pain, palpitations and leg swelling.   Gastrointestinal:  Negative for abdominal pain.   Genitourinary:  Negative for difficulty urinating, dysuria and hematuria.   Musculoskeletal:  Negative for arthralgias and gait problem.   Skin:  Negative for skin lesions.   Neurological:  Negative for syncope, memory problem and confusion.   Psychiatric/Behavioral:  Negative for self-injury and depressed mood.        Objective   Vital Signs:   /74   Pulse 104   Temp 99.1 °F (37.3 °C) (Skin)   Wt 70.3 kg (155 lb)   SpO2 96%   BMI 30.27 kg/m²           Physical Exam  Vitals and nursing note reviewed.   Constitutional:       General: She is not in acute distress.     Appearance: Normal appearance. She is not toxic-appearing.   HENT:      Head: Atraumatic.      Right Ear: External ear normal.      Left Ear: External ear normal.      Nose: Nose normal.      Mouth/Throat:      Mouth: Mucous membranes are moist.   Eyes:      General:         Right eye: No discharge.         Left  eye: No discharge.      Extraocular Movements: Extraocular movements intact.      Pupils: Pupils are equal, round, and reactive to light.   Cardiovascular:      Rate and Rhythm: Regular rhythm. Tachycardia present.      Pulses: Normal pulses.      Heart sounds: Normal heart sounds. No murmur heard.     No gallop.      Comments: Little tachycardic, but no ectopy.  Pulmonary:      Effort: Pulmonary effort is normal. No respiratory distress.      Breath sounds: No wheezing, rhonchi or rales.      Comments: Patient with scattered rhonchi, no labored respiration.  Abdominal:      General: There is no distension.      Palpations: Abdomen is soft. There is no mass.      Tenderness: There is no abdominal tenderness. There is no guarding.   Musculoskeletal:         General: No swelling or tenderness.      Cervical back: No tenderness.      Right lower leg: No edema.      Left lower leg: No edema.      Comments: No edema.   Skin:     General: Skin is warm and dry.      Findings: No rash.   Neurological:      General: No focal deficit present.      Mental Status: She is alert and oriented to person, place, and time. Mental status is at baseline.      Motor: No weakness.      Gait: Gait normal.   Psychiatric:         Mood and Affect: Mood normal.         Thought Content: Thought content normal.          Result Review   The following data was reviewed by: Marco Antonio Leonard MD on 08/10/2022:  [x] Laboratory  [] Microbiology  [] Radiology  [] EKG/telemetry  [] Cardiology/Vascular  [] Pathology  [x] Old records             Assessment and Plan   Diagnoses and all orders for this visit:    1. Acute cough (Primary)  Assessment & Plan:  This is indication for her 12/23 urgent visit.  Apparently been going on for couple weeks now, she does have associated sore throat.    Clinically she has a low-grade temperature, and she is a little tachycardic today.  Sats are 98% on room air though.    Orders:  -     POCT SARS-CoV-2 Antigen DAVIN +  Flu    2. Mild intermittent asthma without complication  Overview:  Reviewed Dr. Borges's note from 3/22:  Asthma has been doing well.    Occasionally when she is outside she has to use albuterol rescue inhaler.  Assessment: environmental allergies and asthma.  Plan: Continue Symbicort 160-4.52 puffs twice daily, albuterol rescue inhaler as needed, Singulair 10 mg daily, Flonase 2 sniffs daily, Zyrtec-D which she has taken and tolerated for a long time.  Has seen allergist in the past.      Assessment & Plan:  Patient appears stable this regards as of her 12/23 urgent visit.  She has upper story infection.  No significant bronchospasm, sats are fine as noted.  She stable to continue with moderate dose Symbicort as well as Singulair and Zyrtec-D.  Of not she has a rescue inhaler available but has not felt the need to use it.      Other orders  -     predniSONE (DELTASONE) 20 MG tablet; 2 tablets once daily for 3 days, 1 tablet once daily for 3 days, 1 tablet every other day for 2 doses.  Dispense: 11 tablet; Refill: 0  -     albuterol sulfate  (90 Base) MCG/ACT inhaler; Inhale 2 puffs Every 4 (Four) Hours As Needed for Wheezing.  Dispense: 18 g; Refill: 1                Follow Up   Return for Next scheduled follow up.  Patient was given instructions and counseling regarding her condition or for health maintenance advice. Please see specific information pulled into the AVS if appropriate.

## 2023-12-13 NOTE — ASSESSMENT & PLAN NOTE
This is indication for her 12/23 urgent visit.  Apparently been going on for couple weeks now, she does have associated sore throat.    Clinically she has a low-grade temperature, and she is a little tachycardic today.  Sats are 98% on room air though.

## 2023-12-15 ENCOUNTER — TELEPHONE (OUTPATIENT)
Dept: INTERNAL MEDICINE | Facility: CLINIC | Age: 78
End: 2023-12-15
Payer: MEDICARE

## 2023-12-15 DIAGNOSIS — F98.8 ATTENTION DEFICIT DISORDER (ADD) WITHOUT HYPERACTIVITY: ICD-10-CM

## 2023-12-15 RX ORDER — LISDEXAMFETAMINE DIMESYLATE CAPSULES 50 MG/1
50 CAPSULE ORAL EVERY MORNING
Qty: 30 CAPSULE | Refills: 0 | Status: CANCELLED | OUTPATIENT
Start: 2023-12-15 | End: 2024-01-14

## 2023-12-15 NOTE — TELEPHONE ENCOUNTER
Caller: Erik Bhatia    Relationship: Self    Best call back number: [phone]     Requested Prescriptions:   Requested Prescriptions     Pending Prescriptions Disp Refills    lisdexamfetamine (Vyvanse) 50 MG capsule 30 capsule 0     Sig: Take 1 capsule by mouth Every Morning for 30 days        Pharmacy where request should be sent: [facility] [id number]  [facility] [place] [facility] [address] [facility] [address]  [phone] [facility] [phone] FX     Last office visit with prescribing clinician: 12/13/2023   Last telemedicine visit with prescribing clinician: Visit date not found   Next office visit with prescribing clinician: 4/9/2024       Does the patient have less than a 3 day supply:  [x] Yes  [] No    Would you like a call back once the refill request has been completed: [] Yes [x] No    If the office needs to give you a call back, can they leave a voicemail: [] Yes [x] No    Tessa Jaquez, PCT   12/15/23 09:48 EST

## 2023-12-18 ENCOUNTER — TELEPHONE (OUTPATIENT)
Dept: INTERNAL MEDICINE | Facility: CLINIC | Age: 78
End: 2023-12-18
Payer: MEDICARE

## 2023-12-18 RX ORDER — LISDEXAMFETAMINE DIMESYLATE CAPSULES 50 MG/1
50 CAPSULE ORAL EVERY MORNING
Qty: 30 CAPSULE | Refills: 0 | Status: SHIPPED | OUTPATIENT
Start: 2024-01-15 | End: 2024-02-14

## 2023-12-18 RX ORDER — LISDEXAMFETAMINE DIMESYLATE CAPSULES 50 MG/1
50 CAPSULE ORAL EVERY MORNING
Qty: 30 CAPSULE | Refills: 0 | Status: SHIPPED | OUTPATIENT
Start: 2024-02-12 | End: 2024-03-13

## 2023-12-18 RX ORDER — LISDEXAMFETAMINE DIMESYLATE CAPSULES 50 MG/1
50 CAPSULE ORAL EVERY MORNING
Qty: 30 CAPSULE | Refills: 0 | Status: SHIPPED | OUTPATIENT
Start: 2023-12-18 | End: 2024-01-17

## 2023-12-18 NOTE — TELEPHONE ENCOUNTER
Caller: Erik Bhatia    Relationship to patient: Self    Best call back number: [phone]    Patient is needing: PATIENT CALLING TO CHECK ON THE STATUS OF HER REFILL FOR VYVANSE. PATIENT STATES SHE IS COMPLETELY OUT OF MEDICATION.

## 2023-12-19 NOTE — TELEPHONE ENCOUNTER
Kaveh let her know that I sent over 90 days = 3 different scripts.  Will not have to call again for this until mid-March

## 2023-12-21 ENCOUNTER — OFFICE VISIT (OUTPATIENT)
Dept: INTERNAL MEDICINE | Facility: CLINIC | Age: 78
End: 2023-12-21
Payer: MEDICARE

## 2023-12-21 VITALS
HEIGHT: 60 IN | DIASTOLIC BLOOD PRESSURE: 81 MMHG | HEART RATE: 86 BPM | BODY MASS INDEX: 30.98 KG/M2 | WEIGHT: 157.8 LBS | OXYGEN SATURATION: 100 % | TEMPERATURE: 98.4 F | SYSTOLIC BLOOD PRESSURE: 138 MMHG | RESPIRATION RATE: 18 BRPM

## 2023-12-21 DIAGNOSIS — R05.1 ACUTE COUGH: ICD-10-CM

## 2023-12-21 DIAGNOSIS — Z12.31 BREAST CANCER SCREENING BY MAMMOGRAM: ICD-10-CM

## 2023-12-21 DIAGNOSIS — J45.20 MILD INTERMITTENT ASTHMA WITHOUT COMPLICATION: Primary | ICD-10-CM

## 2023-12-21 DIAGNOSIS — Z78.0 POSTMENOPAUSE: ICD-10-CM

## 2023-12-21 RX ORDER — CEFDINIR 300 MG/1
300 CAPSULE ORAL 2 TIMES DAILY
Qty: 20 CAPSULE | Refills: 0 | Status: SHIPPED | OUTPATIENT
Start: 2023-12-21

## 2023-12-21 NOTE — PROGRESS NOTES
"Chief Complaint  Follow-up (Follow up over cough, last wednesday), Cough (2 weeks- seems to be getting better), and Nasal Congestion    Subjective      Sigifredoroly FRANCO Jannette presents to [facility]    History of Present Illness:  77 yo female, former pt of Dr Posey, who has has underlying ADD, RAD, GERD, RLS, among others.  She was seen 8/22 as a New Patient, who is coming in now 10/23 for routine 6-month follow-up.  We will go over her med list, review any recent labs, and make further recommendations at that time.---> here again 12/23 for urgent issue per CC above.    Review of Systems   Constitutional:  Negative for appetite change, fatigue and fever.   HENT:  Negative for congestion and ear pain.    Eyes:  Negative for blurred vision.   Respiratory:  Negative for cough, chest tightness, shortness of breath and wheezing.    Cardiovascular:  Negative for chest pain, palpitations and leg swelling.   Gastrointestinal:  Negative for abdominal pain.   Genitourinary:  Negative for difficulty urinating, dysuria and hematuria.   Musculoskeletal:  Negative for arthralgias and gait problem.   Skin:  Negative for skin lesions.   Neurological:  Negative for syncope, memory problem and confusion.   Psychiatric/Behavioral:  Negative for self-injury and depressed mood.        Objective   Vital Signs:   /81 (BP Location: Left arm, Patient Position: Sitting, Cuff Size: Adult)   Pulse 86   Temp 98.4 °F (36.9 °C) (Temporal)   Resp 18   Ht 152.4 cm (60\")   Wt 71.6 kg (157 lb 12.8 oz)   SpO2 100%   BMI 30.82 kg/m²           Physical Exam  Vitals and nursing note reviewed.   Constitutional:       General: She is not in acute distress.     Appearance: Normal appearance. She is not toxic-appearing.   HENT:      Head: Atraumatic.      Right Ear: External ear normal.      Left Ear: External ear normal.      Nose: Nose normal.      Mouth/Throat:      Mouth: Mucous membranes are moist.   Eyes:     "  General:         Right eye: No discharge.         Left eye: No discharge.      Extraocular Movements: Extraocular movements intact.      Pupils: Pupils are equal, round, and reactive to light.   Cardiovascular:      Rate and Rhythm: Regular rhythm. Tachycardia present.      Pulses: Normal pulses.      Heart sounds: Normal heart sounds. No murmur heard.     No gallop.      Comments: Little tachycardic, but no ectopy.  Pulmonary:      Effort: Pulmonary effort is normal. No respiratory distress.      Breath sounds: No wheezing, rhonchi or rales.      Comments: Patient with scattered rhonchi, no labored respiration.  Abdominal:      General: There is no distension.      Palpations: Abdomen is soft. There is no mass.      Tenderness: There is no abdominal tenderness. There is no guarding.   Musculoskeletal:         General: No swelling or tenderness.      Cervical back: No tenderness.      Right lower leg: No edema.      Left lower leg: No edema.      Comments: No edema.   Skin:     General: Skin is warm and dry.      Findings: No rash.   Neurological:      General: No focal deficit present.      Mental Status: She is alert and oriented to person, place, and time. Mental status is at baseline.      Motor: No weakness.      Gait: Gait normal.   Psychiatric:         Mood and Affect: Mood normal.         Thought Content: Thought content normal.          Result Review   The following data was reviewed by: Marco Antonio Leonard MD on 08/10/2022:  [x] Laboratory  [] Microbiology  [] Radiology  [] EKG/telemetry  [] Cardiology/Vascular  [] Pathology  [x] Old records             Assessment and Plan   Diagnoses and all orders for this visit:    1. Mild intermittent asthma without complication (Primary)  Overview:  Reviewed Dr. Borges's note from 3/22:  Asthma has been doing well.    Occasionally when she is outside she has to use albuterol rescue inhaler.  Assessment: environmental allergies and asthma.  Plan: Continue Symbicort 160-4.52  puffs twice daily, albuterol rescue inhaler as needed, Singulair 10 mg daily, Flonase 2 sniffs daily, Zyrtec-D which she has taken and tolerated for a long time.  Has seen allergist in the past.      Assessment & Plan:  Lingering on as of 12/23 urgent visit.  She is not having any significant fevers, but she does have increased congestion, getting some sinus pressure etc., so we will treat per orders, and she will continue with albuterol and Symbicort as written.      2. Acute cough  Assessment & Plan:  As per asthma heading below.      3. Postmenopause  -     DEXA Bone Density Axial; Future    4. Breast cancer screening by mammogram  -     Mammo Screening Digital Tomosynthesis Bilateral With CAD; Future    Other orders  -     cefdinir (OMNICEF) 300 MG capsule; Take 1 capsule by mouth 2 (Two) Times a Day.  Dispense: 20 capsule; Refill: 0                  Follow Up   Return for Next scheduled follow up.  Patient was given instructions and counseling regarding her condition or for health maintenance advice. Please see specific information pulled into the AVS if appropriate.

## 2023-12-21 NOTE — ASSESSMENT & PLAN NOTE
Lingering on as of 12/23 urgent visit.  She is not having any significant fevers, but she does have increased congestion, getting some sinus pressure etc., so we will treat per orders, and she will continue with albuterol and Symbicort as written.

## 2023-12-23 DIAGNOSIS — L89.891 PRESSURE INJURY OF TOE OF RIGHT FOOT, STAGE 1: ICD-10-CM

## 2023-12-26 RX ORDER — CEPHALEXIN 500 MG/1
500 CAPSULE ORAL 3 TIMES DAILY
Qty: 21 CAPSULE | Refills: 0 | OUTPATIENT
Start: 2023-12-26

## 2023-12-26 RX ORDER — PREDNISONE 20 MG/1
TABLET ORAL
Qty: 11 TABLET | Refills: 0 | OUTPATIENT
Start: 2023-12-26

## 2023-12-26 RX ORDER — TRIAMCINOLONE ACETONIDE 1 MG/G
OINTMENT TOPICAL
Qty: 30 G | Refills: 1 | Status: SHIPPED | OUTPATIENT
Start: 2023-12-26

## 2024-01-04 ENCOUNTER — HOSPITAL ENCOUNTER (OUTPATIENT)
Dept: BONE DENSITY | Facility: HOSPITAL | Age: 79
Discharge: HOME | End: 2024-01-04
Payer: MEDICARE

## 2024-01-04 ENCOUNTER — HOSPITAL ENCOUNTER (OUTPATIENT)
Dept: MAMMOGRAPHY | Facility: HOSPITAL | Age: 79
Discharge: HOME | End: 2024-01-04
Payer: MEDICARE

## 2024-01-04 DIAGNOSIS — Z78.0 POSTMENOPAUSE: ICD-10-CM

## 2024-01-04 DIAGNOSIS — Z12.31 BREAST CANCER SCREENING BY MAMMOGRAM: ICD-10-CM

## 2024-01-04 PROCEDURE — 77063 BREAST TOMOSYNTHESIS BI: CPT

## 2024-01-04 PROCEDURE — 77080 DXA BONE DENSITY AXIAL: CPT

## 2024-01-04 PROCEDURE — 77067 SCR MAMMO BI INCL CAD: CPT

## 2024-01-08 ENCOUNTER — TELEPHONE (OUTPATIENT)
Dept: INTERNAL MEDICINE | Facility: CLINIC | Age: 79
End: 2024-01-08
Payer: MEDICARE

## 2024-01-08 DIAGNOSIS — G89.29 CHRONIC LEFT SHOULDER PAIN: Primary | ICD-10-CM

## 2024-01-08 DIAGNOSIS — M25.512 CHRONIC LEFT SHOULDER PAIN: Primary | ICD-10-CM

## 2024-01-08 RX ORDER — BENZONATATE 100 MG/1
CAPSULE ORAL
Qty: 30 CAPSULE | Refills: 1 | Status: SHIPPED | OUTPATIENT
Start: 2024-01-08

## 2024-01-08 NOTE — TELEPHONE ENCOUNTER
Patient called on two things. The first was she is wanting a referral for her left shoulder pain that is worsening. The second thing is last time she was here for an appointment she said she had enough Tessalon pearls for her cough, but she has ran out and still has a cough. She asked if more could be sent in since they were helping her.

## 2024-01-22 ENCOUNTER — OFFICE VISIT (OUTPATIENT)
Dept: PODIATRY | Facility: CLINIC | Age: 79
End: 2024-01-22
Payer: MEDICARE

## 2024-01-22 VITALS
OXYGEN SATURATION: 96 % | BODY MASS INDEX: 30.43 KG/M2 | HEART RATE: 101 BPM | SYSTOLIC BLOOD PRESSURE: 121 MMHG | HEIGHT: 60 IN | TEMPERATURE: 97.8 F | DIASTOLIC BLOOD PRESSURE: 88 MMHG | WEIGHT: 155 LBS

## 2024-01-22 DIAGNOSIS — M79.671 FOOT PAIN, RIGHT: ICD-10-CM

## 2024-01-22 DIAGNOSIS — M20.11 VALGUS DEFORMITY OF BOTH GREAT TOES: ICD-10-CM

## 2024-01-22 DIAGNOSIS — M79.671 FOOT PAIN, BILATERAL: ICD-10-CM

## 2024-01-22 DIAGNOSIS — M20.12 VALGUS DEFORMITY OF BOTH GREAT TOES: ICD-10-CM

## 2024-01-22 DIAGNOSIS — L89.891 PRESSURE INJURY OF TOE OF RIGHT FOOT, STAGE 1: Primary | ICD-10-CM

## 2024-01-22 DIAGNOSIS — L89.891 PRESSURE INJURY OF RIGHT FOOT, STAGE 1: ICD-10-CM

## 2024-01-22 DIAGNOSIS — M20.41 HAMMER TOES OF BOTH FEET: ICD-10-CM

## 2024-01-22 DIAGNOSIS — M79.672 FOOT PAIN, BILATERAL: ICD-10-CM

## 2024-01-22 DIAGNOSIS — L60.0 ONYCHOCRYPTOSIS: ICD-10-CM

## 2024-01-22 DIAGNOSIS — B35.1 ONYCHOMYCOSIS: ICD-10-CM

## 2024-01-22 DIAGNOSIS — M20.42 HAMMER TOES OF BOTH FEET: ICD-10-CM

## 2024-01-22 PROCEDURE — 1159F MED LIST DOCD IN RCRD: CPT | Performed by: PODIATRIST

## 2024-01-22 PROCEDURE — 97597 DBRDMT OPN WND 1ST 20 CM/<: CPT | Performed by: PODIATRIST

## 2024-01-22 PROCEDURE — 1160F RVW MEDS BY RX/DR IN RCRD: CPT | Performed by: PODIATRIST

## 2024-01-22 PROCEDURE — 11721 DEBRIDE NAIL 6 OR MORE: CPT | Performed by: PODIATRIST

## 2024-01-22 NOTE — PROGRESS NOTES
[facility] - PODIATRY    Today's Date: 01/22/24    Patient Name: Erik Bhatia  MRN: [id number]  CSN: [id number]  PCP: Marco Antonio Leonard MD, last PCP visit: 12/21/2023  Referring Provider: No ref. provider found    SUBJECTIVE     Chief Complaint   Patient presents with    Left Foot - Follow-up, Nail Problem    Right Foot - Follow-up, Nail Problem     HPI: Erik Bhatia, a 78 y.o.female, comes presents to clinic with chief complaint of:    New, Established, New Problem: Established    Location:  hyperkeratotic lesion(s) on dorsal right second proximal interphalangeal joint    Duration: 2015    Onset:  gradual    Nature:  sore    Stable, worsening, improving: recurring    Aggravating factors:  Patient reports lesion(s) is painful with shoegear and ambulation.      Previous Treatment:   Debridement  ---------------------------  New, Established, New Problem: est    Location: Toenails    Duration:  Greater than five years    Onset: Gradual    Nature: sore with palpation.    Stable, worsening, improving: stable    Aggravating factors: Pain with shoe gear and ambulation.    Previous Treatment:  Debridement    Medical changes:  Recent sinus infection    Patient denies any fevers, chills, nausea, vomiting, shortness of breathe, nor any other constitutional signs nor symptoms.    Past Medical History:   Diagnosis Date    ADHD (attention deficit hyperactivity disorder) about 2000    Allergic ?    Anemia ?    Arthritis     Asthma     Bladder disorder     Bunion     Callus ?    Chronic allergic rhinitis     Corns and callus     GERD (gastroesophageal reflux disease)     Hammertoe     Ingrown toenail     Limb pain     Limb swelling     Mixed hyperlipidemia 08/10/2022    Numbness in feet     SOB (shortness of breath)      Past Surgical History:   Procedure Laterality Date    BREAST BIOPSY      CARPAL TUNNEL RELEASE      COLONOSCOPY  2020 and before    ENDOSCOPY      TRIGGER FINGER RELEASE       Family History    Problem Relation Age of Onset    Stomach cancer Father     Diabetes Father     Asthma Father     Cancer Father         stomach    Heart disease Mother     Diabetes Maternal Grandfather     Breast cancer Neg Hx     Ovarian cancer Neg Hx     Uterine cancer Neg Hx     Colon cancer Neg Hx      Social History     Socioeconomic History    Marital status:    Tobacco Use    Smoking status: Never     Passive exposure: Never    Smokeless tobacco: Never    Tobacco comments:     none   Vaping Use    Vaping Use: Never used   Substance and Sexual Activity    Alcohol use: Yes     Alcohol/week: 3.0 - 4.0 standard drinks of alcohol     Types: 3 - 4 Glasses of wine per week     Comment: Not every week    Drug use: Never    Sexual activity: Not Currently     Partners: Male     Birth control/protection: Pill, I.U.D.     Comment: Birth control previously but listed above     Allergies   Allergen Reactions    Seasonal Ic [Kelp-B6-Lecithin-Vinegar] Cough    Nitrofurantoin Provider Review Needed     Current Outpatient Medications   Medication Sig Dispense Refill    albuterol sulfate  (90 Base) MCG/ACT inhaler Inhale 2 puffs Every 4 (Four) Hours As Needed for Wheezing. 18 g 1    aluminum hydroxide-magnesium carbonate (Gaviscon)  MG/15ML suspension oral suspension 1 mL.      benzonatate (Tessalon Perles) 100 MG capsule 1 to 2 capsules up to 3 times a day as needed for cough. 30 capsule 1    cetirizine-pseudoephedrine (ZyrTEC-D) 5-120 MG per 12 hr tablet Take 2 tablets by mouth Daily. 180 tablet 1    Cholecalciferol 125 MCG (5000 UT) tablet 1 tablet Daily.      Diclofenac Sodium (VOLTAREN) 1 % gel gel Apply 4 g topically to the appropriate area as directed 4 (Four) Times a Day As Needed (right shoulder pain). 150 g 1    estradiol (ESTRACE) 0.1 MG/GM vaginal cream Insert 1 g into the vagina 2 (Two) Times a Week. 42.5 g 3    fluticasone (Flonase) 50 MCG/ACT nasal spray 2 sprays into the nostril(s) as directed by provider  Daily. 45 mL 3    lisdexamfetamine (Vyvanse) 50 MG capsule Take 1 capsule by mouth Every Morning for 30 days 30 capsule 0    lisdexamfetamine (Vyvanse) 50 MG capsule Take 1 capsule by mouth Every Morning for 30 days 30 capsule 0    [START ON 2/12/2024] lisdexamfetamine (Vyvanse) 50 MG capsule Take 1 capsule by mouth Every Morning for 30 days 30 capsule 0    montelukast (SINGULAIR) 10 MG tablet Take 1 tablet by mouth Daily. 90 tablet 3    olopatadine (PATADAY) 0.2 % solution ophthalmic solution 1 drop.      omeprazole (priLOSEC) 20 MG capsule Take 1 capsule by mouth Daily. 90 capsule 3    potassium chloride (MICRO-K) 10 MEQ CR capsule Take 2 capsules by mouth Daily. 180 capsule 3    predniSONE (DELTASONE) 20 MG tablet 2 tablets once daily for 3 days, 1 tablet once daily for 3 days, 1 tablet every other day for 2 doses. 11 tablet 0    silver sulfadiazine (SILVADENE, SSD) 1 % cream APPLY TOPICALLY TWICE A DAY TO THE APPROPRIATE AREA AS DIRECTED 25 g 5    Symbicort 160-4.5 MCG/ACT inhaler INHALE 2 PUFFS BY MOUTH TWICE A DAY 10.2 g 1    triamcinolone (KENALOG) 0.1 % ointment APPLY TO AFFECTED AREA(S) THREE TIMES A DAY AS DIRECTED 30 g 1    cefdinir (OMNICEF) 300 MG capsule Take 1 capsule by mouth 2 (Two) Times a Day. (Patient not taking: Reported on 1/22/2024) 20 capsule 0     No current facility-administered medications for this visit.     Review of Systems   Constitutional: Negative.    Skin:         Painful hyperkeratotic lesion and toenails   All other systems reviewed and are negative.      OBJECTIVE     Vitals:    01/22/24 1100   BP: 121/88   Pulse: 101   Temp: 97.8 °F (36.6 °C)   SpO2: 96%         Body mass index is 30.27 kg/m².    Lab Results   Component Value Date    GLUCOSE 93 10/02/2023    CALCIUM 9.7 10/02/2023     10/02/2023    K 3.6 10/02/2023    CO2 27.0 10/02/2023     10/02/2023    BUN 16 10/02/2023    CREATININE 0.70 10/02/2023    EGFRIFNONA 79 09/10/2021    BCR 22.9 10/02/2023    ANIONGAP  9.0 10/02/2023       Patient seen in no apparent distress.      PHYSICAL EXAM:     Foot/Ankle Exam    GENERAL  Appearance:  appears stated age and elderly  Orientation:  AAOx3  Affect:  appropriate  Gait:  unimpaired  Assistance:  independent  Right shoe gear: casual shoe  Left shoe gear: casual shoe    VASCULAR     Right Foot Vascularity   Normal vascular exam    Dorsalis pedis:  2+  Posterior tibial:  2+  Skin temperature:  warm  Edema grading:  None  CFT:  < 3 seconds  Pedal hair growth:  Present  Varicosities:  none     Left Foot Vascularity   Normal vascular exam    Dorsalis pedis:  2+  Posterior tibial:  2+  Skin temperature:  warm  Edema grading:  None  CFT:  < 3 seconds  Pedal hair growth:  Present  Varicosities:  none     NEUROLOGIC     Right Foot Neurologic   Normal sensation    Light touch sensation: normal  Vibratory sensation: normal  Hot/Cold sensation: normal  Protective Sensation using Seminole-Ayush Monofilament:   Sites intact: 10  Sites tested: 10     Left Foot Neurologic   Normal sensation    Light touch sensation: normal  Vibratory sensation: normal  Hot/Cold sensation:  normal  Protective Sensation using Seminole-Ayush Monofilament:   Sites intact: 10  Sites tested: 10    MUSCULOSKELETAL     Right Foot Musculoskeletal   Hammertoe:  Second toe, third toe, fourth toe and fifth toe  Hallux valgus: Yes       Left Foot Musculoskeletal   Hammertoe:  Second toe, third toe, fourth toe and fifth toe  Hallux valgus: Yes      MUSCLE STRENGTH     Right Foot Muscle Strength   Foot dorsiflexion:  4  Foot plantar flexion:  4  Foot inversion:  4  Foot eversion:  4     Left Foot Muscle Strength   Foot dorsiflexion:  4  Foot plantar flexion:  4  Foot inversion:  4  Foot eversion:  4    RANGE OF MOTION     Right Foot Range of Motion   Foot and ankle ROM within normal limits       Left Foot Range of Motion   Foot and ankle ROM within normal limits      DERMATOLOGIC      Right Foot Dermatologic   Skin  Positive  for ulcer.   Nails  1.  Positive for elongated, onychomycosis, abnormal thickness, subungual debris and ingrown toenail.  2.  Positive for elongated, onychomycosis, abnormal thickness, subungual debris and ingrown toenail.  3.  Positive for elongated, onychomycosis, abnormal thickness, subungual debris and ingrown toenail.  4.  Positive for elongated, onychomycosis, abnormal thickness, subungual debris and ingrown toenail.  5.  Positive for elongated, onychomycosis, abnormal thickness, subungual debris and ingrown toenail.     Left Foot Dermatologic   Skin  Left foot skin is intact.   Nails  1.  Positive for elongated, onychomycosis, abnormal thickness, subungual debris and ingrown toenail.  2.  Positive for elongated, onychomycosis, abnormal thickness, subungual debris and ingrown toenail.  3.  Positive for elongated, onychomycosis, abnormal thickness, subungual debris and ingrown toenail.  4.  Positive for elongated, onychomycosis, abnormally thick, subungual debris and ingrown toenail.  5.  Positive for elongated, onychomycosis, abnormally thick, subungual debris and ingrown toenail.     Right foot additional comments: Stage 1 ulceration on the dorsal right second PIP joint area of the foot.  Hyperkeratotic tissue with subepidermal hemosiderin deposition is present.  No surrounding, edema, erythema, lymphangitis, fluctuance, nor signs of infection.  No drainage present.  13 mm x 10 mm x 3 mm.  This area was debrided via excisional partial thickness debridement with a 15 scalpel blade.  Post debridement measurements were 11 mm x 8 mm x 1 mm in depth.        ASSESSMENT/PLAN     Diagnoses and all orders for this visit:    1. Pressure injury of toe of right foot, stage 1 (Primary)    2. Valgus deformity of both great toes    3. Onychomycosis    4. Hammer toes of both feet    5. Onychocryptosis    6. Pressure injury of right foot, stage 1    7. Foot pain, bilateral    8. Foot pain, right    Comprehensive lower  extremity examination and evaluation was performed.    Discussed findings and treatment plan including risks, benefits, and treatment options with patient in detail. Patient agreed with treatment plan.    Toenails 1 through 5 bilaterally were debrided in thickness and length with toenail tremors.  These were then smoothed with a dermal sanding bur.  Patient tolerated the procedure well.    An After Visit Summary was printed and given to the patient at discharge, including (if requested) any available informative/educational handouts regarding diagnosis, treatment, or medications. All questions were answered to patient/family satisfaction. Should symptoms fail to improve or worsen they agree to call or return to clinic or to go to the Emergency Department. Discussed the importance of following up with any needed screening tests/labs/specialist appointments and any requested follow-up recommended by me today. Importance of maintaining follow-up discussed and patient accepts that missed appointments can delay diagnosis and potentially lead to worsening of conditions.    Return in about 9 weeks (around 3/25/2024) for Toenail Care, Ulcer Follow-Up., or sooner if acute issues arise.    This document has been electronically signed by Rian Jo DPM on January 22, 2024 11:35 EST

## 2024-01-23 ENCOUNTER — OFFICE VISIT (OUTPATIENT)
Dept: ORTHOPEDIC SURGERY | Facility: CLINIC | Age: 79
End: 2024-01-23
Payer: MEDICARE

## 2024-01-23 VITALS
BODY MASS INDEX: 30.43 KG/M2 | OXYGEN SATURATION: 94 % | HEART RATE: 104 BPM | DIASTOLIC BLOOD PRESSURE: 75 MMHG | HEIGHT: 60 IN | SYSTOLIC BLOOD PRESSURE: 145 MMHG | WEIGHT: 155 LBS

## 2024-01-23 DIAGNOSIS — M25.512 LEFT SHOULDER PAIN, UNSPECIFIED CHRONICITY: Primary | ICD-10-CM

## 2024-01-23 DIAGNOSIS — S49.92XA INJURY OF LEFT SHOULDER, INITIAL ENCOUNTER: ICD-10-CM

## 2024-01-23 PROCEDURE — 99203 OFFICE O/P NEW LOW 30 MIN: CPT | Performed by: ORTHOPAEDIC SURGERY

## 2024-01-23 NOTE — PROGRESS NOTES
"Chief Complaint  Initial Evaluation of the Left Shoulder     Symone Bhatia presents to [facility] for initial evaluation of the left shoulder. She has had pain since November.  She has a new screen porch with 3 steps down and has a toy poodle and she had a fall when she was turning.  She landed with her arm outstretched.  She notes the pain is progressively worse.  She has difficulty at end ROM.  Prior to the fall she had no problems to the left shoulder.     Allergies   Allergen Reactions    Seasonal Ic [Kelp-B6-Lecithin-Vinegar] Cough    Nitrofurantoin Provider Review Needed        Social History     Socioeconomic History    Marital status:    Tobacco Use    Smoking status: Never     Passive exposure: Never    Smokeless tobacco: Never    Tobacco comments:     none   Vaping Use    Vaping Use: Never used   Substance and Sexual Activity    Alcohol use: Yes     Alcohol/week: 3.0 - 4.0 standard drinks of alcohol     Types: 3 - 4 Glasses of wine per week     Comment: Not every week    Drug use: Never    Sexual activity: Not Currently     Partners: Male     Birth control/protection: Pill, I.U.D.     Comment: Birth control previously but listed above        I reviewed the patient's chief complaint, history of present illness, review of systems, past medical history, surgical history, family history, social history, medications, and allergy list.     Review of Systems     Constitutional: Denies fevers, chills, weight loss  Cardiovascular: Denies chest pain, shortness of breath  Skin: Denies rashes, acute skin changes  Neurologic: Denies headache, loss of consciousness        Vital Signs:   /75   Pulse 104   Ht 152.4 cm (60\")   Wt 70.3 kg (155 lb)   SpO2 94%   BMI 30.27 kg/m²          Physical Exam  General: Alert. No acute distress    Ortho Exam        LEFT SHOULDER Forward flexion 160. Abduction 100. External rotation 40. Internal rotation SI joint. " Positive Cross body adduction. Supraspinatus strength 4/5. Infraspinatus Strength 4/5. Infrared subscap 4/5. Positive Peres. Positive Neer. Negative Apprehension. Negative Lift off. (Negative Obriens. Sensation intact to light touch, median, radial, ulnar nerve. Positive AIN, PIN, ulnar nerve motor. Positive pulses. Positive Impingement signs. Good strength in triceps, biceps, deltoid, wrist extensors and wrist flexors. Tender to palpation to the anterior aspect of the shoulder and down the arm.  Positive Empty Can Test and Drop Arm Test.       Procedures      Imaging Results (Most Recent)       Procedure Component Value Units Date/Time    XR Scapula Left [[id number]] Resulted: 01/23/24 1430     Updated: 01/23/24 1437             Result Review :          X-Ray Report:  Left scapula X-Ray  Indication: Evaluation of the left scapula  AP/Lateral view(s)  Findings: No fracture or dislocation.  Mild to moderate arthritis.   Prior studies available for comparison: yes           Assessment and Plan     Diagnoses and all orders for this visit:    1. Left shoulder pain, unspecified chronicity (Primary)  -     XR Scapula Left    2. Injury of left shoulder, initial encounter        Discussed the treatment plan with the patient. I reviewed the X-rays that were obtained today with the patient.     MRI to assess the left shoulder.      Take Mobic. HEP exercises.     Call or return if worsening symptoms.    Follow Up     MRI of the left shoulder.       Patient was given instructions and counseling regarding her condition or for health maintenance advice. Please see specific information pulled into the AVS if appropriate.     Scribed for Kareem Guerrero MD by Narda Foster MA.  01/23/24   14:32 EST    I have personally performed the services described in this document as scribed by the above individual and it is both accurate and complete. Kareem Guerrero MD 01/23/24

## 2024-02-01 ENCOUNTER — TELEPHONE (OUTPATIENT)
Dept: ORTHOPEDIC SURGERY | Facility: CLINIC | Age: 79
End: 2024-02-01
Payer: MEDICARE

## 2024-02-01 NOTE — TELEPHONE ENCOUNTER
Caller:     Relationship to patient:     Best call back number: [phone] (home)       Chief complaint: LEFT     Type of visit: MRI     Requested date: “If scheduling a follow up for MRI results, appointment must be 72 hours out from imaging appointment”       If rescheduling, when is the original appointment: NA     Additional notes:MRI FOLLOW UP- SCHEDULED 02/27/2027- LEFT SHOULDER- NO HX OF SX

## 2024-02-02 ENCOUNTER — TELEPHONE (OUTPATIENT)
Dept: ORTHOPEDIC SURGERY | Facility: CLINIC | Age: 79
End: 2024-02-02
Payer: MEDICARE

## 2024-02-02 NOTE — TELEPHONE ENCOUNTER
Pt called wanting refill on meloxicam since MRI was sandi to 2/27. Please call at [phone]. thanks

## 2024-02-04 RX ORDER — MELOXICAM 15 MG/1
15 TABLET ORAL DAILY
Qty: 30 TABLET | Refills: 0 | Status: SHIPPED | OUTPATIENT
Start: 2024-02-04

## 2024-02-22 DIAGNOSIS — F98.8 ATTENTION DEFICIT DISORDER (ADD) WITHOUT HYPERACTIVITY: ICD-10-CM

## 2024-02-22 RX ORDER — LISDEXAMFETAMINE DIMESYLATE CAPSULES 50 MG/1
50 CAPSULE ORAL EVERY MORNING
Qty: 30 CAPSULE | Refills: 0 | Status: SHIPPED | OUTPATIENT
Start: 2024-02-22 | End: 2024-03-23

## 2024-02-22 NOTE — TELEPHONE ENCOUNTER
Caller: Erik Bhatia    Relationship: Self    Best call back number: [phone]    Requested Prescriptions:   Requested Prescriptions     Pending Prescriptions Disp Refills    lisdexamfetamine (Vyvanse) 50 MG capsule 30 capsule 0     Sig: Take 1 capsule by mouth Every Morning for 30 days        Pharmacy where request should be sent: [facility] [id number]  [facility] [place] [facility] [address] [facility] [address]  [phone] [facility] [phone] FX     Last office visit with prescribing clinician: 12/21/2023   Last telemedicine visit with prescribing clinician: Visit date not found   Next office visit with prescribing clinician: 4/9/2024     Does the patient have less than a 3 day supply:  [x] Yes  [] No

## 2024-02-27 ENCOUNTER — HOSPITAL ENCOUNTER (OUTPATIENT)
Dept: MRI IMAGING | Facility: HOSPITAL | Age: 79
Discharge: HOME | End: 2024-02-27
Admitting: ORTHOPAEDIC SURGERY
Payer: MEDICARE

## 2024-02-27 DIAGNOSIS — S49.92XA INJURY OF LEFT SHOULDER, INITIAL ENCOUNTER: ICD-10-CM

## 2024-02-27 DIAGNOSIS — M25.512 LEFT SHOULDER PAIN, UNSPECIFIED CHRONICITY: ICD-10-CM

## 2024-02-27 PROCEDURE — 73221 MRI JOINT UPR EXTREM W/O DYE: CPT

## 2024-02-29 ENCOUNTER — OFFICE VISIT (OUTPATIENT)
Dept: ORTHOPEDIC SURGERY | Facility: CLINIC | Age: 79
End: 2024-02-29
Payer: MEDICARE

## 2024-02-29 VITALS
SYSTOLIC BLOOD PRESSURE: 135 MMHG | BODY MASS INDEX: 30.43 KG/M2 | DIASTOLIC BLOOD PRESSURE: 75 MMHG | OXYGEN SATURATION: 97 % | WEIGHT: 155 LBS | HEIGHT: 60 IN | HEART RATE: 99 BPM

## 2024-02-29 DIAGNOSIS — M75.122 COMPLETE TEAR OF LEFT ROTATOR CUFF, UNSPECIFIED WHETHER TRAUMATIC: ICD-10-CM

## 2024-02-29 DIAGNOSIS — M25.512 LEFT SHOULDER PAIN, UNSPECIFIED CHRONICITY: Primary | ICD-10-CM

## 2024-02-29 RX ORDER — TRIAMCINOLONE ACETONIDE 40 MG/ML
40 INJECTION, SUSPENSION INTRA-ARTICULAR; INTRAMUSCULAR
Status: COMPLETED | OUTPATIENT
Start: 2024-02-29 | End: 2024-02-29

## 2024-02-29 RX ORDER — LIDOCAINE HYDROCHLORIDE 10 MG/ML
5 INJECTION, SOLUTION INFILTRATION; PERINEURAL
Status: COMPLETED | OUTPATIENT
Start: 2024-02-29 | End: 2024-02-29

## 2024-02-29 RX ADMIN — LIDOCAINE HYDROCHLORIDE 5 ML: 10 INJECTION, SOLUTION INFILTRATION; PERINEURAL at 09:06

## 2024-02-29 RX ADMIN — TRIAMCINOLONE ACETONIDE 40 MG: 40 INJECTION, SUSPENSION INTRA-ARTICULAR; INTRAMUSCULAR at 09:06

## 2024-02-29 NOTE — PROGRESS NOTES
Chief Complaint  Follow-up of the Left Shoulder     Symone Bhatia presents to [facility] for follow up of the left shoulder.  She had a MRI on 2/27/24 and is here to review. She has had pain since November. She has a new screen porch with 3 steps down and has a toy poodle and she had a fall when she was turning. She landed with her arm outstretched. She notes the pain is progressively worse. She has difficulty at end ROM. Prior to the fall she had no problems to the left shoulder. She has no pain with overhead.  She has pain with cross body and rotation of the shoulder. She has increase pain with reaching for her seat belt and getting prescriptions out of the drive through.     Allergies   Allergen Reactions    Seasonal Ic [Kelp-B6-Lecithin-Vinegar] Cough    Nitrofurantoin Provider Review Needed        Social History     Socioeconomic History    Marital status:    Tobacco Use    Smoking status: Never     Passive exposure: Never    Smokeless tobacco: Never    Tobacco comments:     none   Vaping Use    Vaping Use: Never used   Substance and Sexual Activity    Alcohol use: Yes     Alcohol/week: 3.0 - 4.0 standard drinks of alcohol     Types: 3 - 4 Glasses of wine per week     Comment: Not every week    Drug use: Never    Sexual activity: Not Currently     Partners: Male     Birth control/protection: I.U.D., Birth control pill, Pill     Comment: All birth control and sexual partner info from  past        I reviewed the patient's chief complaint, history of present illness, review of systems, past medical history, surgical history, family history, social history, medications, and allergy list.     Review of Systems     Constitutional: Denies fevers, chills, weight loss  Cardiovascular: Denies chest pain, shortness of breath  Skin: Denies rashes, acute skin changes  Neurologic: Denies headache, loss of consciousness        Vital Signs:   /75   Pulse 99   Ht  "152.4 cm (60\")   Wt 70.3 kg (155 lb)   SpO2 97%   BMI 30.27 kg/m²          Physical Exam  General: Alert. No acute distress    Ortho Exam           LEFT SHOULDER Forward flexion 170. Abduction 100. External rotation 40. Internal rotation SI joint. Positive Cross body adduction. Supraspinatus strength 4/5. Infraspinatus Strength 4/5. Infrared subscap 4/5. Positive Peres. Positive Neer. Negative Apprehension. Negative Lift off. (Negative Obriens. Sensation intact to light touch, median, radial, ulnar nerve. Positive AIN, PIN, ulnar nerve motor. Positive pulses. Positive Impingement signs. Good strength in triceps, biceps, deltoid, wrist extensors and wrist flexors. Tender to palpation to the anterior aspect of the shoulder and down the arm.  Positive Empty Can Test and Drop Arm Test.      Large Joint: L glenohumeral  Date/Time: 2/29/2024 9:06 AM  Consent given by: patient  Site marked: site marked  Timeout: Immediately prior to procedure a time out was called to verify the correct patient, procedure, equipment, support staff and site/side marked as required   Supporting Documentation  Indications: pain   Procedure Details  Location: shoulder - L glenohumeral  Preparation: Patient was prepped and draped in the usual sterile fashion  Needle gauge: 21g.  Medications administered: 5 mL lidocaine 1 %; 40 mg triamcinolone acetonide 40 MG/ML  Patient tolerance: patient tolerated the procedure well with no immediate complications        Imaging Results (Most Recent)       None             Result Review :     MRI Shoulder Left Without Contrast    Result Date: 2/27/2024  Narrative: PROCEDURE: MRI SHOULDER LEFT WO CONTRAST  COMPARISON: [facility], XR SCAPULA LEFT, 1/23/2024, 14:43.  INDICATIONS: LEFT SHOULDER PAIN and limited range of motion since falling      TECHNIQUE: A variety of imaging planes and parameters were utilized for visualization of suspected pathology.  Images were performed without contrast.   " FINDINGS:  There is a full-thickness, likely full width tear of the distal supraspinatus.  Torn tendon fibers appear to be at the superior-medial aspect of the humeral head.  There is fluid in the subacromial/subdeltoid and subscapularis bursa consistent with full-thickness tear.  There appears to be partial thickness articular surface tear of the anterior infraspinatus.  There is some intrasubstance fluid signal extending medially to the infraspinatus musculotendinous junction.  The teres minor and subscapularis tendons appear grossly intact.  No significant muscle edema or atrophy.  The long head of the biceps tendon appears normally position.  There is suspected tendinopathy without definite high-grade tear or tenosynovitis.  Glenohumeral alignment appears within normal limits.  No significant joint effusion.  Cortical irregularity and subcortical hyperintense signal foci at the superior-lateral humeral head and greater tuberosity are suspected to be related to enthesopathy.  No definitive erosions.  There is suspected mild glenohumeral joint space narrowing and cartilage thinning.  There appears to be mild osteophyte formation suggesting mild osteoarthritis.  There are degenerative signal changes in the labrum without definite paralabral cyst or significant fluid signal defect seen on this exam.  There are advanced degenerative changes at the acromioclavicular joint.  There is fluid at the joint space and superior to the acromioclavicular joint which can be seen with full-thickness rotator cuff tear.  Acromioclavicular alignment appears within normal limits.  There is periarticular marrow edema signal with possible cyst versus erosions at the distal clavicle and acromion.  No definite acute fracture.  No axillary adenopathy or suspicious osseous lesion is identified.  On the localizer images there appears to be a large hiatal hernia.      Impression:   1. Full-thickness, likely full width tear of the distal  supraspinatus and partial-thickness tear of the anterior infraspinatus. 2. Advanced degenerative changes at the acromioclavicular joint with periarticular edema which could be associated with active arthropathy or chronic distal clavicle osteolysis.  Fluid within and superior to the acromioclavicular joint is suspected to be secondary to the rotator cuff tear. 3. Mild-to-moderate glenohumeral osteoarthritis.      THALIA PEREZ MD       Electronically Signed and Approved By: THALIA PEREZ MD on 2/27/2024 at 16:30                     Assessment and Plan     Diagnoses and all orders for this visit:    1. Left shoulder pain, unspecified chronicity (Primary)    2. Complete tear of left rotator cuff, unspecified whether traumatic        Discussed the treatment plan with the patient. I reviewed the MRI results with the patient.     Discussed the risks and benefits of conservative measures. The patient expressed understanding and wished to proceed with a left shoulder steroid injection.  She tolerated the injection well.     Prescribed physical therapy.       Call or return if worsening symptoms.    Follow Up     PRN      Patient was given instructions and counseling regarding her condition or for health maintenance advice. Please see specific information pulled into the AVS if appropriate.     Scribed for Kareem Guerrero MD by Narda Foster MA.  02/29/24   08:53 EST    I have personally performed the services described in this document as scribed by the above individual and it is both accurate and complete. Kareem Guerrero MD 02/29/24

## 2024-03-13 ENCOUNTER — TREATMENT (OUTPATIENT)
Dept: PHYSICAL THERAPY | Facility: CLINIC | Age: 79
End: 2024-03-13
Payer: MEDICARE

## 2024-03-13 DIAGNOSIS — M25.512 CHRONIC LEFT SHOULDER PAIN: Primary | ICD-10-CM

## 2024-03-13 DIAGNOSIS — M25.612 DECREASED ROM OF LEFT SHOULDER: ICD-10-CM

## 2024-03-13 DIAGNOSIS — M62.81 MUSCLE WEAKNESS OF LEFT UPPER EXTREMITY: ICD-10-CM

## 2024-03-13 DIAGNOSIS — R26.89 BALANCE PROBLEM: ICD-10-CM

## 2024-03-13 DIAGNOSIS — G89.29 CHRONIC LEFT SHOULDER PAIN: Primary | ICD-10-CM

## 2024-03-13 NOTE — PROGRESS NOTES
Physical Therapy Initial Evaluation and Plan of Care      [facility]: [address] [place] [address]      Patient: Erik Bhatia   : 1945  Diagnosis/ICD-10 Code:  Chronic left shoulder pain [M25.512, G89.29]  Referring practitioner: Kareem Guerrero MD  Date of Initial Visit: 3/13/2024  Today's Date: 3/13/2024  Patient seen for 1 sessions           Subjective Questionnaire: QuickDASH: 24      Subjective   Pt reports to physical therapy w/ complaints of L shoulder pain. Pt reports they experienced a fall in in SEP or OCT. Pt reports they landed w/ their arm straight. At the time, pt did not have increased pain, but that changed after a couple of weeks. Pt has had an injection, and since then, they have felt better. Pt reports they do not have constant pain, though pain w/ certain motions and lifting heavy items. Pt reports their current pain is a 2/10. Pt reports their pain can get up to a 5-6/10. Pt has taken some prescribed medication, OTC medication, and heat to reduce their pain.   Pt reports they are struggling w/ tasks that require them to reach out to the side and behind their back.       Pt occupation: retired    Quality of pain: ache    Past Medical Hx: osteoporosis, R shoulder pain in the past, falls in the past     MRI IMPRESSION:                 1. Full-thickness, likely full width tear of the distal supraspinatus and partial-thickness tear of   the anterior infraspinatus.  2. Advanced degenerative changes at the acromioclavicular joint with periarticular edema which   could be associated with active arthropathy or chronic distal clavicle osteolysis.  Fluid within   and superior to the acromioclavicular joint is suspected to be secondary to the rotator cuff tear.  3. Mild-to-moderate glenohumeral osteoarthritis.         Electronically Signed and Approved By: THALIA PEREZ MD on 2024 at 16:30        Objective          Active Range of Motion   Left Shoulder   Flexion: 150  degrees   Abduction: 155 degrees   External rotation BTH: T2   Internal rotation BTB: T9     Right Shoulder   Flexion: 143 degrees   Abduction: 155 degrees   External rotation BTH: T4   Internal rotation BTB: T11     Additional Active Range of Motion Details  Pt is R handed     Strength/Myotome Testing     Left Shoulder     Planes of Motion   Flexion: 4-   Abduction: 4-   External rotation at 0°: 4-   Internal rotation at 0°: 4     Isolated Muscles   Biceps: 5   Triceps: 5     Right Shoulder     Planes of Motion   Flexion: 4+   Abduction: 4-   External rotation at 0°: 5   Internal rotation at 0°: 5     Isolated Muscles   Biceps: 5   Triceps: 5     Additional Strength Details  Pt reports R shoulder discomfort after MMT       See Exercise, Manual, and Modality Logs for complete treatment.       Assessment & Plan       Assessment  Impairments: abnormal muscle firing, abnormal or restricted ROM, activity intolerance, impaired balance, impaired physical strength, lacks appropriate home exercise program and pain with function   Functional limitations: carrying objects, lifting, sleeping, pulling, pushing, uncomfortable because of pain, reaching behind back, reaching overhead and unable to perform repetitive tasks   Assessment details: Pt reports to physical therapy w/ complaints of L shoulder pain. Pt presents w/ decreased strength and pain w/ functional motions. Pt has increased perceived deficits described by LEFS. Discussed w/ pt about frequency of falls, resulting in different shoulder injuries. Encouraged pt to remain safe w/ ambulation. Educated pt on their HEP as well to address their impairments. Pt will benefit from skilled physical therapy, to address their current impairments and limitations, in order to improve upon their functional mobility and gait to return to ADLs safely and without restrictions.       Prognosis: good    Goals  Plan Goals: SHOULDER  PROBLEMS:     1. Carrying, Moving, and Handling Objects  Functional Limitation     LTG 1: 12 weeks:  The patient will demonstrate 9 % limitation by achieving a score of 15 on the QuickDASH.    STATUS:  New   STG 1a: 6 weeks:  The patient will demonstrate 15 % limitation by achieving a score of 20 on the QuickDASH.      STATUS:  New   TREATMENT:  Manual therapy, therapeutic exercise, home exercise instruction, and modalities as needed to include: moist heat, electrical stimulation, and ultrasound.     2. The patient has limited strength of the L shoulder.   LTG 2: 12 weeks:  The patient will demonstrate 4+/5 strength for L shoulder flexion, abduction, external rotation, and internal rotation in order to demonstrate improved shoulder stability.    STATUS:  New   STG 2a: 8 weeks:  The patient will demonstrate 4/5 strength for L shoulder flexion, abduction, external rotation, and internal rotation.    STATUS:  New   STG2b:  2 weeks:  The patient will be independent with home exercises.     STATUS:  New   TREATMENT: Manual therapy, therapeutic exercise, home exercise instruction, and modalities as needed to include: electrical stimulation, ultrasound, moist heat, and ice.     3. The patient complains of pain to the L shoulder.   LTG 3: 12 weeks:  The patient will report a pain rating of 1/10 or better in order to improve sleep quality and tolerance to performance of activities of daily living.    STATUS:  New   STG 3a: 8 weeks:  The patient will report a pain rating of 3/10 or better.     STATUS:  New   TREATMENT: Manual therapy, therapeutic exercise, home exercise instruction, and modalities as needed to include: electrical stimulation, ultrasound, moist heat, and ice.            PLAN:  Therapy options: will receive skilled therapy services  Planned modality interventions: Cryotherapy, Heat, and TENS  Planned therapy interventions:balance/weight-bearing training, ADL retraining, soft tissue mobilization, strengthening, stretching, therapeutic activities, manual therapy, joint  mobilization, home exercise program/patient education, gait training, functional ROM exercises, flexibility, body mechanics training, postural training, and neuromuscular re-education  Frequency: 2x per week  Duration in weeks: 12  Treatment plan discussed with: patient      Visit Diagnoses:    ICD-10-CM ICD-9-CM   1. Chronic left shoulder pain  M25.512 719.41    G89.29 338.29   2. Muscle weakness of left upper extremity  M62.81 728.87   3. Decreased ROM of left shoulder  M25.612 719.51   4. Balance problem  R26.89 781.99       History # of Personal Factors and/or Comorbidities: LOW (0)  Examination of Body System(s): # of elements: LOW (1-2)  Clinical Presentation: STABLE   Clinical Decision Making: LOW       Timed:         Manual Therapy:    0     mins  88586;     Therapeutic Exercise:    25     mins  11465;     Neuromuscular Meron:    0    mins  35655;    Therapeutic Activity:     0     mins  18141;     Gait Trainin     mins  62186;     Ultrasound:     0     mins  77189;    Ionto                               0    mins   19993  Self Care                       0     mins   29278  Canalith Repos    0     mins 64211      Un-Timed:  Electrical Stimulation:    0     mins  39653 ( );  Dry Needling     0     mins self-pay  Traction     0     mins 48793  Low Eval     15     Mins  44746  Mod Eval     0     Mins  66450  High Eval                       0     Mins  25917  Re-Eval                           0    mins  48199    Timed Treatment:   25   mins   Total Treatment:     40   mins    PT SIGNATURE: Danny Singh PT, DPT    Electronically signed 3/13/2024    [place] License: PT - [id number]    Initial Certification  Certification Period: 3/13/2024 thru 6/10/2024  I certify that the therapy services are furnished while this patient is under my care.  The services outlined above are required by this patient, and will be reviewed every 90 days.     PHYSICIAN: Kareem Guerrero MD  NPI: [id number]      DATE:     Please  sign and return via fax to [phone]. Thank you, [facility].

## 2024-03-20 ENCOUNTER — TREATMENT (OUTPATIENT)
Dept: PHYSICAL THERAPY | Facility: CLINIC | Age: 79
End: 2024-03-20
Payer: MEDICARE

## 2024-03-20 DIAGNOSIS — G89.29 CHRONIC LEFT SHOULDER PAIN: Primary | ICD-10-CM

## 2024-03-20 DIAGNOSIS — M25.612 DECREASED ROM OF LEFT SHOULDER: ICD-10-CM

## 2024-03-20 DIAGNOSIS — M62.81 MUSCLE WEAKNESS OF LEFT UPPER EXTREMITY: ICD-10-CM

## 2024-03-20 DIAGNOSIS — M25.512 CHRONIC LEFT SHOULDER PAIN: Primary | ICD-10-CM

## 2024-03-20 DIAGNOSIS — R26.89 BALANCE PROBLEM: ICD-10-CM

## 2024-03-20 NOTE — PROGRESS NOTES
Physical Therapy Daily Treatment Note  [place] [facility] [address] [place] [address]    Patient: Erik Bhatia   : 1945  Referring practitioner: Kareem Guerrero MD  Date of Initial Visit: Type: THERAPY  Noted: 3/13/2024  Today's Date: 3/20/2024  Patient seen for 2 sessions           Subjective  Erik Bhatia reports: she has no pain in L shoulder since receiving the injection.        Objective   See Exercise, Manual, and Modality Logs for complete treatment.       Assessment/Plan  Reviewed HEP today. Educated Erik in upright posture with attention to scapular retraction/depression during AROM  B shoulders. She is just beginning care to attend to deficits outlined in IE. Further therapist directed care required to attain best possible outcome.      Visit Diagnoses:    ICD-10-CM ICD-9-CM   1. Chronic left shoulder pain  M25.512 719.41    G89.29 338.29   2. Muscle weakness of left upper extremity  M62.81 728.87   3. Decreased ROM of left shoulder  M25.612 719.51   4. Balance problem  R26.89 781.99       Progress per Plan of Care and Progress strengthening /stabilization /functional activity           Timed:  Manual Therapy:         mins  52950;  Therapeutic Exercise:         mins  80696;     Neuromuscular Meron:    12    mins  26685;    Therapeutic Activity:     12     mins  19494;     Gait Training:           mins  67147;     Ultrasound:          mins  12761;    Electrical Stimulation:         mins  11884 ( );  Aquatics  __   mins   44826    Untimed:  Electrical Stimulation:         mins  19044 ( );  Mechanical Traction:         mins  50262;     Timed Treatment:   24   mins   Total Treatment:     24   mins    Electronically Signed:  Charlene Jenkins PTA  Physical Therapist Assistant    [place] PTA license [id number]

## 2024-03-27 DIAGNOSIS — F98.8 ATTENTION DEFICIT DISORDER (ADD) WITHOUT HYPERACTIVITY: ICD-10-CM

## 2024-03-27 NOTE — TELEPHONE ENCOUNTER
Caller: Erik Bhatia    Relationship: Self    Best call back number: [phone]     Requested Prescriptions:   Requested Prescriptions     Pending Prescriptions Disp Refills    lisdexamfetamine (Vyvanse) 50 MG capsule 30 capsule 0     Sig: Take 1 capsule by mouth Every Morning for 30 days        Pharmacy where request should be sent: [facility] [id number]  [facility] [place] - [address] AT [facility] [address]  [phone] [facility] [phone] FX     Last office visit with prescribing clinician: 12/21/2023   Last telemedicine visit with prescribing clinician: Visit date not found   Next office visit with prescribing clinician: 4/9/2024     Additional details provided by patient:     Does the patient have less than a 3 day supply:  [x] Yes  [] No    Would you like a call back once the refill request has been completed: [x] Yes [] No    If the office needs to give you a call back, can they leave a voicemail: [x] Yes [] No    Rosanna Matthews Rep   03/27/24 15:14 EDT

## 2024-03-28 RX ORDER — LISDEXAMFETAMINE DIMESYLATE CAPSULES 50 MG/1
50 CAPSULE ORAL EVERY MORNING
Qty: 30 CAPSULE | Refills: 0 | Status: SHIPPED | OUTPATIENT
Start: 2024-03-28 | End: 2024-04-27

## 2024-04-03 ENCOUNTER — TREATMENT (OUTPATIENT)
Dept: PHYSICAL THERAPY | Facility: CLINIC | Age: 79
End: 2024-04-03
Payer: MEDICARE

## 2024-04-03 DIAGNOSIS — G89.29 CHRONIC LEFT SHOULDER PAIN: Primary | ICD-10-CM

## 2024-04-03 DIAGNOSIS — R26.89 BALANCE PROBLEM: ICD-10-CM

## 2024-04-03 DIAGNOSIS — M62.81 MUSCLE WEAKNESS OF LEFT UPPER EXTREMITY: ICD-10-CM

## 2024-04-03 DIAGNOSIS — M25.612 DECREASED ROM OF LEFT SHOULDER: ICD-10-CM

## 2024-04-03 DIAGNOSIS — M25.512 CHRONIC LEFT SHOULDER PAIN: Primary | ICD-10-CM

## 2024-04-05 ENCOUNTER — CLINICAL SUPPORT (OUTPATIENT)
Dept: INTERNAL MEDICINE | Age: 79
End: 2024-04-05
Payer: MEDICARE

## 2024-04-05 DIAGNOSIS — E55.9 VITAMIN D DEFICIENCY: ICD-10-CM

## 2024-04-05 DIAGNOSIS — Z00.00 WELL ADULT EXAM: ICD-10-CM

## 2024-04-05 DIAGNOSIS — D50.8 IRON DEFICIENCY ANEMIA DUE TO DIETARY CAUSES: ICD-10-CM

## 2024-04-05 DIAGNOSIS — E78.2 MIXED HYPERLIPIDEMIA: ICD-10-CM

## 2024-04-05 DIAGNOSIS — E87.6 HYPOKALEMIA: ICD-10-CM

## 2024-04-05 DIAGNOSIS — E53.8 VITAMIN B12 DEFICIENCY: ICD-10-CM

## 2024-04-05 LAB
25(OH)D3 SERPL-MCNC: 21.6 NG/ML (ref 30–100)
ALBUMIN SERPL-MCNC: 4.2 G/DL (ref 3.5–5.2)
ALBUMIN/GLOB SERPL: 1.5 G/DL
ALP SERPL-CCNC: 86 U/L (ref 39–117)
ALT SERPL W P-5'-P-CCNC: 22 U/L (ref 1–33)
ANION GAP SERPL CALCULATED.3IONS-SCNC: 9 MMOL/L (ref 5–15)
AST SERPL-CCNC: 21 U/L (ref 1–32)
BASOPHILS # BLD AUTO: 0.05 10*3/MM3 (ref 0–0.2)
BASOPHILS NFR BLD AUTO: 0.8 % (ref 0–1.5)
BILIRUB SERPL-MCNC: 0.3 MG/DL (ref 0–1.2)
BUN SERPL-MCNC: 22 MG/DL (ref 8–23)
BUN/CREAT SERPL: 26.8 (ref 7–25)
CALCIUM SPEC-SCNC: 9.7 MG/DL (ref 8.6–10.5)
CHLORIDE SERPL-SCNC: 103 MMOL/L (ref 98–107)
CHOLEST SERPL-MCNC: 218 MG/DL (ref 0–200)
CO2 SERPL-SCNC: 27 MMOL/L (ref 22–29)
CREAT SERPL-MCNC: 0.82 MG/DL (ref 0.57–1)
DEPRECATED RDW RBC AUTO: 40.6 FL (ref 37–54)
EGFRCR SERPLBLD CKD-EPI 2021: 72.9 ML/MIN/1.73
EOSINOPHIL # BLD AUTO: 0.19 10*3/MM3 (ref 0–0.4)
EOSINOPHIL NFR BLD AUTO: 3.1 % (ref 0.3–6.2)
ERYTHROCYTE [DISTWIDTH] IN BLOOD BY AUTOMATED COUNT: 12.5 % (ref 12.3–15.4)
FERRITIN SERPL-MCNC: 51.3 NG/ML (ref 13–150)
FOLATE SERPL-MCNC: 11.1 NG/ML (ref 4.78–24.2)
GLOBULIN UR ELPH-MCNC: 2.8 GM/DL
GLUCOSE SERPL-MCNC: 94 MG/DL (ref 65–99)
HBA1C MFR BLD: 6.3 % (ref 4.8–5.6)
HCT VFR BLD AUTO: 36.5 % (ref 34–46.6)
HDLC SERPL-MCNC: 66 MG/DL (ref 40–60)
HGB BLD-MCNC: 11.9 G/DL (ref 12–15.9)
IMM GRANULOCYTES # BLD AUTO: 0.01 10*3/MM3 (ref 0–0.05)
IMM GRANULOCYTES NFR BLD AUTO: 0.2 % (ref 0–0.5)
IRON 24H UR-MRATE: 58 MCG/DL (ref 37–145)
IRON SATN MFR SERPL: 15 % (ref 20–50)
LDLC SERPL CALC-MCNC: 139 MG/DL (ref 0–100)
LDLC/HDLC SERPL: 2.08 {RATIO}
LYMPHOCYTES # BLD AUTO: 1.64 10*3/MM3 (ref 0.7–3.1)
LYMPHOCYTES NFR BLD AUTO: 27 % (ref 19.6–45.3)
MAGNESIUM SERPL-MCNC: 2.5 MG/DL (ref 1.6–2.4)
MCH RBC QN AUTO: 28.7 PG (ref 26.6–33)
MCHC RBC AUTO-ENTMCNC: 32.6 G/DL (ref 31.5–35.7)
MCV RBC AUTO: 88.2 FL (ref 79–97)
MONOCYTES # BLD AUTO: 0.8 10*3/MM3 (ref 0.1–0.9)
MONOCYTES NFR BLD AUTO: 13.2 % (ref 5–12)
NEUTROPHILS NFR BLD AUTO: 3.39 10*3/MM3 (ref 1.7–7)
NEUTROPHILS NFR BLD AUTO: 55.7 % (ref 42.7–76)
NRBC BLD AUTO-RTO: 0 /100 WBC (ref 0–0.2)
PLATELET # BLD AUTO: 221 10*3/MM3 (ref 140–450)
PMV BLD AUTO: 11 FL (ref 6–12)
POTASSIUM SERPL-SCNC: 4.1 MMOL/L (ref 3.5–5.2)
PROT SERPL-MCNC: 7 G/DL (ref 6–8.5)
RBC # BLD AUTO: 4.14 10*6/MM3 (ref 3.77–5.28)
SODIUM SERPL-SCNC: 139 MMOL/L (ref 136–145)
TIBC SERPL-MCNC: 393 MCG/DL (ref 298–536)
TRANSFERRIN SERPL-MCNC: 264 MG/DL (ref 200–360)
TRIGL SERPL-MCNC: 72 MG/DL (ref 0–150)
VIT B12 BLD-MCNC: 453 PG/ML (ref 211–946)
VLDLC SERPL-MCNC: 13 MG/DL (ref 5–40)
WBC NRBC COR # BLD AUTO: 6.08 10*3/MM3 (ref 3.4–10.8)

## 2024-04-05 PROCEDURE — 83540 ASSAY OF IRON: CPT | Performed by: INTERNAL MEDICINE

## 2024-04-05 PROCEDURE — 83036 HEMOGLOBIN GLYCOSYLATED A1C: CPT | Performed by: INTERNAL MEDICINE

## 2024-04-05 PROCEDURE — 83735 ASSAY OF MAGNESIUM: CPT | Performed by: INTERNAL MEDICINE

## 2024-04-05 PROCEDURE — 36415 COLL VENOUS BLD VENIPUNCTURE: CPT | Performed by: INTERNAL MEDICINE

## 2024-04-05 PROCEDURE — 82607 VITAMIN B-12: CPT | Performed by: INTERNAL MEDICINE

## 2024-04-05 PROCEDURE — 82728 ASSAY OF FERRITIN: CPT | Performed by: INTERNAL MEDICINE

## 2024-04-05 PROCEDURE — 85025 COMPLETE CBC W/AUTO DIFF WBC: CPT | Performed by: INTERNAL MEDICINE

## 2024-04-05 PROCEDURE — 82306 VITAMIN D 25 HYDROXY: CPT | Performed by: INTERNAL MEDICINE

## 2024-04-05 PROCEDURE — 82746 ASSAY OF FOLIC ACID SERUM: CPT | Performed by: INTERNAL MEDICINE

## 2024-04-05 PROCEDURE — 80053 COMPREHEN METABOLIC PANEL: CPT | Performed by: INTERNAL MEDICINE

## 2024-04-05 PROCEDURE — 84466 ASSAY OF TRANSFERRIN: CPT | Performed by: INTERNAL MEDICINE

## 2024-04-05 PROCEDURE — 80061 LIPID PANEL: CPT | Performed by: INTERNAL MEDICINE

## 2024-04-09 ENCOUNTER — OFFICE VISIT (OUTPATIENT)
Dept: INTERNAL MEDICINE | Age: 79
End: 2024-04-09
Payer: MEDICARE

## 2024-04-09 VITALS
SYSTOLIC BLOOD PRESSURE: 126 MMHG | TEMPERATURE: 97.7 F | HEIGHT: 60 IN | WEIGHT: 152.6 LBS | RESPIRATION RATE: 18 BRPM | BODY MASS INDEX: 29.96 KG/M2 | DIASTOLIC BLOOD PRESSURE: 82 MMHG | OXYGEN SATURATION: 99 % | HEART RATE: 91 BPM

## 2024-04-09 DIAGNOSIS — F98.8 ATTENTION DEFICIT DISORDER (ADD) WITHOUT HYPERACTIVITY: ICD-10-CM

## 2024-04-09 DIAGNOSIS — E55.9 VITAMIN D DEFICIENCY: ICD-10-CM

## 2024-04-09 DIAGNOSIS — J45.40 MODERATE PERSISTENT ASTHMA WITHOUT COMPLICATION: ICD-10-CM

## 2024-04-09 DIAGNOSIS — E78.2 MIXED HYPERLIPIDEMIA: ICD-10-CM

## 2024-04-09 DIAGNOSIS — Z00.00 MEDICARE ANNUAL WELLNESS VISIT, SUBSEQUENT: Primary | ICD-10-CM

## 2024-04-09 DIAGNOSIS — R73.01 IMPAIRED FASTING GLUCOSE: ICD-10-CM

## 2024-04-09 DIAGNOSIS — D50.8 IRON DEFICIENCY ANEMIA DUE TO DIETARY CAUSES: ICD-10-CM

## 2024-04-09 PROBLEM — R05.1 ACUTE COUGH: Status: RESOLVED | Noted: 2023-04-20 | Resolved: 2024-04-09

## 2024-04-09 PROBLEM — J45.20 MILD INTERMITTENT ASTHMA WITHOUT COMPLICATION: Status: RESOLVED | Noted: 2023-12-13 | Resolved: 2024-04-09

## 2024-04-09 PROCEDURE — G0439 PPPS, SUBSEQ VISIT: HCPCS | Performed by: INTERNAL MEDICINE

## 2024-04-09 PROCEDURE — 1170F FXNL STATUS ASSESSED: CPT | Performed by: INTERNAL MEDICINE

## 2024-04-09 PROCEDURE — 99214 OFFICE O/P EST MOD 30 MIN: CPT | Performed by: INTERNAL MEDICINE

## 2024-04-09 PROCEDURE — 1160F RVW MEDS BY RX/DR IN RCRD: CPT | Performed by: INTERNAL MEDICINE

## 2024-04-09 RX ORDER — BUDESONIDE AND FORMOTEROL FUMARATE DIHYDRATE 160; 4.5 UG/1; UG/1
2 AEROSOL RESPIRATORY (INHALATION) 2 TIMES DAILY
Qty: 30.6 G | Refills: 3 | Status: SHIPPED | OUTPATIENT
Start: 2024-04-09 | End: 2024-07-08

## 2024-04-09 RX ORDER — MONTELUKAST SODIUM 10 MG/1
10 TABLET ORAL DAILY
Qty: 90 TABLET | Refills: 3 | Status: SHIPPED | OUTPATIENT
Start: 2024-04-09

## 2024-04-09 NOTE — PROGRESS NOTES
The ABCs of the Annual Wellness Visit  Subsequent Medicare Wellness Visit    Symone Bhatia is a 79 y.o. female who presents for a Subsequent Medicare Wellness Visit.    The following portions of the patient's history were reviewed and   updated as appropriate: allergies, current medications, past family history, past medical history, past social history, past surgical history, and problem list.    Compared to one year ago, the patient feels her physical   health is the same.    Compared to one year ago, the patient feels her mental   health is the same.    Recent Hospitalizations:  She was not admitted to the hospital during the last year.       Current Medical Providers:  Patient Care Team:  Marco Antonio Leonard MD as PCP - General (Internal Medicine)    Outpatient Medications Prior to Visit   Medication Sig Dispense Refill    albuterol sulfate  (90 Base) MCG/ACT inhaler Inhale 2 puffs Every 4 (Four) Hours As Needed for Wheezing. 18 g 1    aluminum hydroxide-magnesium carbonate (Gaviscon)  MG/15ML suspension oral suspension 1 mL.      cetirizine-pseudoephedrine (ZyrTEC-D) 5-120 MG per 12 hr tablet Take 2 tablets by mouth Daily. 180 tablet 1    Cholecalciferol 125 MCG (5000 UT) tablet 1 tablet Daily.      Diclofenac Sodium (VOLTAREN) 1 % gel gel Apply 4 g topically to the appropriate area as directed 4 (Four) Times a Day As Needed (right shoulder pain). 150 g 1    estradiol (ESTRACE) 0.1 MG/GM vaginal cream Insert 1 g into the vagina 2 (Two) Times a Week. 42.5 g 3    fluticasone (Flonase) 50 MCG/ACT nasal spray 2 sprays into the nostril(s) as directed by provider Daily. 45 mL 3    lisdexamfetamine (Vyvanse) 50 MG capsule Take 1 capsule by mouth Every Morning for 30 days 30 capsule 0    meloxicam (Mobic) 15 MG tablet Take 1 tablet by mouth Daily. 30 tablet 0    olopatadine (PATADAY) 0.2 % solution ophthalmic solution 1 drop.      omeprazole (priLOSEC) 20 MG capsule Take 1 capsule by mouth  Daily. 90 capsule 3    potassium chloride (MICRO-K) 10 MEQ CR capsule Take 2 capsules by mouth Daily. 180 capsule 3    silver sulfadiazine (SILVADENE, SSD) 1 % cream APPLY TOPICALLY TWICE A DAY TO THE APPROPRIATE AREA AS DIRECTED 25 g 5    triamcinolone (KENALOG) 0.1 % ointment APPLY TO AFFECTED AREA(S) THREE TIMES A DAY AS DIRECTED 30 g 1    montelukast (SINGULAIR) 10 MG tablet Take 1 tablet by mouth Daily. 90 tablet 3    Symbicort 160-4.5 MCG/ACT inhaler INHALE 2 PUFFS BY MOUTH TWICE A DAY 10.2 g 1    benzonatate (Tessalon Perles) 100 MG capsule 1 to 2 capsules up to 3 times a day as needed for cough. 30 capsule 1    cefdinir (OMNICEF) 300 MG capsule Take 1 capsule by mouth 2 (Two) Times a Day. 20 capsule 0    predniSONE (DELTASONE) 20 MG tablet 2 tablets once daily for 3 days, 1 tablet once daily for 3 days, 1 tablet every other day for 2 doses. 11 tablet 0     No facility-administered medications prior to visit.       No opioid medication identified on active medication list. I have reviewed chart for other potential  high risk medication/s and harmful drug interactions in the elderly.        Aspirin is not on active medication list.  Aspirin use is not indicated based on review of current medical condition/s. Risk of harm outweighs potential benefits.  .    Patient Active Problem List   Diagnosis    Moderate persistent asthma without complication    Vitamin D deficiency    RLS (restless legs syndrome)    Attention deficit disorder (ADD) without hyperactivity    Acute pain of right shoulder    Iron deficiency anemia due to dietary causes    Gastroesophageal reflux disease without esophagitis    Vaginal atrophy    Chronic rhinitis    Mixed hyperlipidemia    Aortic heart murmur    Hypokalemia    Vitamin B12 deficiency    Medicare annual wellness visit, subsequent    Impaired fasting glucose     Advance Care Planning   Advance Care Planning     Advance Directive is on file.  ACP discussion was held with the patient  "during this visit. Patient has an advance directive in EMR which is still valid.      Objective    Vitals:    24 1036   BP: 126/82   BP Location: Left arm   Patient Position: Sitting   Cuff Size: Large Adult   Pulse: 91   Resp: 18   Temp: 97.7 °F (36.5 °C)   SpO2: 99%   Weight: 69.2 kg (152 lb 9.6 oz)   Height: 152.4 cm (60\")     Estimated body mass index is 29.8 kg/m² as calculated from the following:    Height as of this encounter: 152.4 cm (60\").    Weight as of this encounter: 69.2 kg (152 lb 9.6 oz).           Does the patient have evidence of cognitive impairment?   No    Lab Results   Component Value Date    TRIG 72 2024    HDL 66 (H) 2024     (H) 2024    VLDL 13 2024    HGBA1C 6.30 (H) 2024          HEALTH RISK ASSESSMENT    Smoking Status:  Social History     Tobacco Use   Smoking Status Never    Passive exposure: Never   Smokeless Tobacco Never   Tobacco Comments    none     Alcohol Consumption:  Social History     Substance and Sexual Activity   Alcohol Use Yes    Alcohol/week: 3.0 - 4.0 standard drinks of alcohol    Types: 3 - 4 Glasses of wine per week    Comment: Not every week     Fall Risk Screen:    RAUL Fall Risk Assessment was completed, and patient is at LOW risk for falls.Assessment completed on:2024    Depression Screenin/9/2024    10:35 AM   PHQ-2/PHQ-9 Depression Screening   Little Interest or Pleasure in Doing Things 0-->not at all   Feeling Down, Depressed or Hopeless 0-->not at all   PHQ-9: Brief Depression Severity Measure Score 0       Health Habits and Functional and Cognitive Screenin/9/2024    11:00 AM   Functional & Cognitive Status   Do you have difficulty preparing food and eating? No   Do you have difficulty bathing yourself, getting dressed or grooming yourself? No   Do you have difficulty using the toilet? No   Do you have difficulty moving around from place to place? No   Do you have trouble with steps or " getting out of a bed or a chair? No   Current Diet Well Balanced Diet   Do you need help using the phone?  No   Are you deaf or do you have serious difficulty hearing?  No   Do you need help to go to places out of walking distance? No   Do you need help shopping? No   Do you need help preparing meals?  No   Do you need help with housework?  No   Do you need help with laundry? No   Do you need help taking your medications? No   Do you need help managing money? No   Do you ever drive or ride in a car without wearing a seat belt? No   Do you have difficulty concentrating, remembering or making decisions? No       Age-appropriate Screening Schedule:  Refer to the list below for future screening recommendations based on patient's age, sex and/or medical conditions. Orders for these recommended tests are listed in the plan section. The patient has been provided with a written plan.    Health Maintenance   Topic Date Due    RSV Vaccine - Adults (1 - 1-dose 60+ series) Never done    ZOSTER VACCINE (2 of 3) 10/27/2008    COVID-19 Vaccine (4 - 2023-24 season) 09/01/2023    INFLUENZA VACCINE  08/01/2024    BMI FOLLOWUP  02/28/2025    LIPID PANEL  04/05/2025    ANNUAL WELLNESS VISIT  04/09/2025    DXA SCAN  01/04/2026    TDAP/TD VACCINES (3 - Td or Tdap) 03/15/2033    HEPATITIS C SCREENING  Completed    Pneumococcal Vaccine 65+  Completed                  CMS Preventative Services Quick Reference  Risk Factors Identified During Encounter:    Fall Risk-High or Moderate: Discussed Fall Prevention in the home    The above risks/problems have been discussed with the patient.  Pertinent information has been shared with the patient in the After Visit Summary.    Diagnoses and all orders for this visit:    1. Medicare annual wellness visit, subsequent (Primary)  Assessment & Plan:  AWV completed 4/24.  Patient remains very alert and independent.  She is more active helping take care of her little puppy.  She did have 1 accidental fall  in the yard on uneven ground.  No trauma.  Patient no hospitalizations past year.  She has advance care directive on file already at the [facility]    Orders:  -     TSH; Future  -     Vitamin B12 anemia; Future  -     Folate anemia; Future    2. Moderate persistent asthma without complication  Assessment & Plan:  Patient had exacerbation back in December we saw her a couple of times for that.  Things have stabilized as of her 4/24 OV, we will get her Symbicort refilled, she will continue with this as well as Singulair and her other allergy medications.            3. Mixed hyperlipidemia  Assessment & Plan:  LDL is up from 115 to 139 as of her 4/24 OV.  Has not required treatment previously due to relatively low risk, if remains as such, I think were okay, but if trends up further would recommend low-dose statin.    Orders:  -     Comprehensive Metabolic Panel; Future  -     Lipid Panel; Future    4. Attention deficit disorder (ADD) without hyperactivity  Assessment & Plan:  Patient continues to tolerate Vyvanse without any side effects, no palpitations no hypertension, so stable to continue current dosing as of 4/24 OV.      5. Impaired fasting glucose  Assessment & Plan:  Patient's A1c came back at 6.3.  Not exactly sure why we checked this, her fasting sugars have been under 100 here lately.  Patient is aware to maybe back off on the sweets a little bit, we will obviously keep an eye on this going forward now.    Orders:  -     Hemoglobin A1c; Future    6. Iron deficiency anemia due to dietary causes  Assessment & Plan:  Hemoglobin stable 11.9, iron is low normal but reasonable, plus she is intolerant to oral iron.  Certainly no IV iron indicated, will repeat in 6 months    Orders:  -     CBC & Differential; Future  -     Ferritin; Future  -     Iron Profile; Future    7. Vitamin D deficiency  Assessment & Plan:  Vitamin D has trended down from 30 to 21 as of her 4/24 OV.  She will try to get it in more frequently,  repeat labs in 6 months.    Orders:  -     Vitamin D,25-Hydroxy; Future    Other orders  -     budesonide-formoterol (Symbicort) 160-4.5 MCG/ACT inhaler; Inhale 2 puffs 2 (Two) Times a Day for 90 days.  Dispense: 30.6 g; Refill: 3  -     montelukast (SINGULAIR) 10 MG tablet; Take 1 tablet by mouth Daily.  Dispense: 90 tablet; Refill: 3        Follow Up:   Next Medicare Wellness visit to be scheduled in 1 year.      An After Visit Summary and PPPS were made available to the patient.

## 2024-04-09 NOTE — ASSESSMENT & PLAN NOTE
Hemoglobin stable 11.9, iron is low normal but reasonable, plus she is intolerant to oral iron.  Certainly no IV iron indicated, will repeat in 6 months

## 2024-04-09 NOTE — ASSESSMENT & PLAN NOTE
ELE completed 4/24.  Patient remains very alert and independent.  She is more active helping take care of her little puppy.  She did have 1 accidental fall in the yard on uneven ground.  No trauma.  Patient no hospitalizations past year.  She has advance care directive on file already at the [facility]

## 2024-04-09 NOTE — ASSESSMENT & PLAN NOTE
Patient's A1c came back at 6.3.  Not exactly sure why we checked this, her fasting sugars have been under 100 here lately.  Patient is aware to maybe back off on the sweets a little bit, we will obviously keep an eye on this going forward now.

## 2024-04-09 NOTE — ASSESSMENT & PLAN NOTE
Vitamin D has trended down from 30 to 21 as of her 4/24 OV.  She will try to get it in more frequently, repeat labs in 6 months.

## 2024-04-09 NOTE — ASSESSMENT & PLAN NOTE
Patient had exacerbation back in December we saw her a couple of times for that.  Things have stabilized as of her 4/24 OV, we will get her Symbicort refilled, she will continue with this as well as Singulair and her other allergy medications.

## 2024-04-09 NOTE — ASSESSMENT & PLAN NOTE
LDL is up from 115 to 139 as of her 4/24 OV.  Has not required treatment previously due to relatively low risk, if remains as such, I think were okay, but if trends up further would recommend low-dose statin.

## 2024-04-09 NOTE — ASSESSMENT & PLAN NOTE
Patient continues to tolerate Vyvanse without any side effects, no palpitations no hypertension, so stable to continue current dosing as of 4/24 OV.

## 2024-04-09 NOTE — PROGRESS NOTES
"Chief Complaint  Follow-up (Patient is here for a 4 month follow up, lab work follow up. Patient has no concerns at this time )    Symone Bhatia presents to [facility]    History of Present Illness:  80 yo female, former pt of Dr Posey, who has has underlying ADD, RAD, GERD, RLS, among others.  She was seen 8/22 as a New Patient, who is coming in now 4/24 for routine 6-month follow-up.  We will go over her med list, review any recent labs, and make further recommendations at that time.    Review of Systems   Constitutional:  Negative for appetite change, fatigue and fever.   HENT:  Negative for congestion and ear pain.    Eyes:  Negative for blurred vision.   Respiratory:  Negative for cough, chest tightness, shortness of breath and wheezing.    Cardiovascular:  Negative for chest pain, palpitations and leg swelling.   Gastrointestinal:  Negative for abdominal pain.   Genitourinary:  Negative for difficulty urinating, dysuria and hematuria.   Musculoskeletal:  Negative for arthralgias and gait problem.   Skin:  Negative for skin lesions.   Neurological:  Negative for syncope, memory problem and confusion.   Psychiatric/Behavioral:  Negative for self-injury and depressed mood.        Objective   Vital Signs:   /82 (BP Location: Left arm, Patient Position: Sitting, Cuff Size: Large Adult)   Pulse 91   Temp 97.7 °F (36.5 °C)   Resp 18   Ht 152.4 cm (60\")   Wt 69.2 kg (152 lb 9.6 oz)   SpO2 99%   BMI 29.80 kg/m²           Physical Exam  Vitals and nursing note reviewed.   Constitutional:       General: She is not in acute distress.     Appearance: Normal appearance. She is not toxic-appearing.   HENT:      Head: Atraumatic.      Right Ear: External ear normal.      Left Ear: External ear normal.      Nose: Nose normal.      Mouth/Throat:      Mouth: Mucous membranes are moist.   Eyes:      General:         Right eye: No discharge.         Left eye: No " discharge.      Extraocular Movements: Extraocular movements intact.      Pupils: Pupils are equal, round, and reactive to light.   Cardiovascular:      Rate and Rhythm: Regular rhythm. Tachycardia present.      Pulses: Normal pulses.      Heart sounds: Normal heart sounds. No murmur heard.     No gallop.      Comments: Little tachycardic, but no ectopy.  Pulmonary:      Effort: Pulmonary effort is normal. No respiratory distress.      Breath sounds: No wheezing, rhonchi or rales.      Comments: Patient with scattered rhonchi, no labored respiration.  Abdominal:      General: There is no distension.      Palpations: Abdomen is soft. There is no mass.      Tenderness: There is no abdominal tenderness. There is no guarding.   Musculoskeletal:         General: No swelling or tenderness.      Cervical back: No tenderness.      Right lower leg: No edema.      Left lower leg: No edema.      Comments: No edema.   Skin:     General: Skin is warm and dry.      Findings: No rash.   Neurological:      General: No focal deficit present.      Mental Status: She is alert and oriented to person, place, and time. Mental status is at baseline.      Motor: No weakness.      Gait: Gait normal.   Psychiatric:         Mood and Affect: Mood normal.         Thought Content: Thought content normal.          Result Review   The following data was reviewed by: Marco Antonio Leonard MD on 08/10/2022:  [x] Laboratory  [] Microbiology  [] Radiology  [] EKG/telemetry  [] Cardiology/Vascular  [] Pathology  [x] Old records             Assessment and Plan   Diagnoses and all orders for this visit:    1. Medicare annual wellness visit, subsequent (Primary)  Assessment & Plan:  AWV completed 4/24.  Patient remains very alert and independent.  She is more active helping take care of her little puppy.  She did have 1 accidental fall in the yard on uneven ground.  No trauma.  Patient no hospitalizations past year.  She has advance care directive on file already  at the [facility]    Orders:  -     TSH; Future  -     Vitamin B12 anemia; Future  -     Folate anemia; Future    2. Moderate persistent asthma without complication  Overview:  Reviewed Dr. Borges's note from 3/22:  Asthma has been doing well.    Occasionally when she is outside she has to use albuterol rescue inhaler.  Assessment: environmental allergies and asthma.  Plan: Continue Symbicort 160-4.52 puffs twice daily, albuterol rescue inhaler as needed, Singulair 10 mg daily, Flonase 2 sniffs daily, Zyrtec-D which she has taken and tolerated for a long time.  Has seen allergist in the past.    Assessment & Plan:  Patient had exacerbation back in December we saw her a couple of times for that.  Things have stabilized as of her 4/24 OV, we will get her Symbicort refilled, she will continue with this as well as Singulair and her other allergy medications.            3. Mixed hyperlipidemia  Assessment & Plan:  LDL is up from 115 to 139 as of her 4/24 OV.  Has not required treatment previously due to relatively low risk, if remains as such, I think were okay, but if trends up further would recommend low-dose statin.    Orders:  -     Comprehensive Metabolic Panel; Future  -     Lipid Panel; Future    4. Attention deficit disorder (ADD) without hyperactivity  Assessment & Plan:  Patient continues to tolerate Vyvanse without any side effects, no palpitations no hypertension, so stable to continue current dosing as of 4/24 OV.      5. Impaired fasting glucose  Assessment & Plan:  Patient's A1c came back at 6.3.  Not exactly sure why we checked this, her fasting sugars have been under 100 here lately.  Patient is aware to maybe back off on the sweets a little bit, we will obviously keep an eye on this going forward now.    Orders:  -     Hemoglobin A1c; Future    6. Iron deficiency anemia due to dietary causes  Assessment & Plan:  Hemoglobin stable 11.9, iron is low normal but reasonable, plus she is intolerant to oral iron.   Certainly no IV iron indicated, will repeat in 6 months    Orders:  -     CBC & Differential; Future  -     Ferritin; Future  -     Iron Profile; Future    7. Vitamin D deficiency  Assessment & Plan:  Vitamin D has trended down from 30 to 21 as of her 4/24 OV.  She will try to get it in more frequently, repeat labs in 6 months.    Orders:  -     Vitamin D,25-Hydroxy; Future    Other orders  -     budesonide-formoterol (Symbicort) 160-4.5 MCG/ACT inhaler; Inhale 2 puffs 2 (Two) Times a Day for 90 days.  Dispense: 30.6 g; Refill: 3  -     montelukast (SINGULAIR) 10 MG tablet; Take 1 tablet by mouth Daily.  Dispense: 90 tablet; Refill: 3                    Follow Up   Return in about 6 months (around 10/9/2024).  Patient was given instructions and counseling regarding her condition or for health maintenance advice. Please see specific information pulled into the AVS if appropriate.

## 2024-04-10 ENCOUNTER — TREATMENT (OUTPATIENT)
Dept: PHYSICAL THERAPY | Facility: CLINIC | Age: 79
End: 2024-04-10
Payer: MEDICARE

## 2024-04-10 DIAGNOSIS — M25.512 CHRONIC LEFT SHOULDER PAIN: Primary | ICD-10-CM

## 2024-04-10 DIAGNOSIS — M25.612 DECREASED ROM OF LEFT SHOULDER: ICD-10-CM

## 2024-04-10 DIAGNOSIS — M62.81 MUSCLE WEAKNESS OF LEFT UPPER EXTREMITY: ICD-10-CM

## 2024-04-10 DIAGNOSIS — G89.29 CHRONIC LEFT SHOULDER PAIN: Primary | ICD-10-CM

## 2024-04-10 DIAGNOSIS — R26.89 BALANCE PROBLEM: ICD-10-CM

## 2024-04-10 NOTE — PROGRESS NOTES
Progress Note   [facility]: [address] [place] [address]      Patient: Erik Bhatia   : 1945  Diagnosis/ICD-10 Code:  Chronic left shoulder pain [M25.512, G89.29]  Referring practitioner: Kareem Geurrero MD  Date of Initial Visit: Type: THERAPY  Noted: 3/13/2024  Today's Date: 4/10/2024  Patient seen for 4 sessions      Subjective:   Subjective Questionnaire: QuickDASH: 22  Clinical Progress: unchanged  Home Program Compliance: Yes  Treatment has included: balance/weight-bearing training, ADL retraining, soft tissue mobilization, strengthening, stretching, therapeutic activities, manual therapy, joint mobilization, home exercise program/patient education, gait training, functional ROM exercises, flexibility, body mechanics training, postural training, and neuromuscular re-education    Subjective   Pt reports they have been doing well w/ their L shoulder. Pt reports they feel as though the injection seemed to help. Pt reports they have been keeping up w/ their HEP as they can. Pt reports they need to find ways to be more vigilant about their exs.        Objective     Active Range of Motion   Left Shoulder   Flexion: 150 degrees   Abduction: 155 degrees   External rotation BTH: T2   Internal rotation BTB: T9 (some discomfort)     Right Shoulder   Flexion: 150 degrees   Abduction: 155 degrees   External rotation BTH: T4   Internal rotation BTB: T9      Additional Active Range of Motion Details  Pt is R handed      Strength/Myotome Testing      Left Shoulder      Planes of Motion   Flexion: 4   Abduction: 4   External rotation at 0°: 4+   Internal rotation at 0°: 4+      Isolated Muscles   Biceps: 5   Triceps: 5      Right Shoulder      Planes of Motion   Flexion: 4+   Abduction: 4   External rotation at 0°: 5   Internal rotation at 0°: 5      Isolated Muscles   Biceps: 5   Triceps: 5      Additional Strength Details  Pt reports R shoulder discomfort after MMT        See Exercise, Manual, and  Modality Logs for complete treatment.     Assessment/Plan  Impairments:  abnormal or restricted ROM,  impaired balance, impaired physical strength, and pain with function   Functional limitations: carrying objects, lifting, pulling, pushing,  reaching behind back, reaching overhead     Pt reported to physical therapy w/ complaints of L shoulder pain. Pt demonstrates improvement in their ROM as well as their strength. Pt has minimal changes to their perceived deficits described by QuickDASH. Extensively discussed pt's HEP and ways to further improve upon performance of this. Discussed w/ pt about performance of ADLs and incorporation in L shoulder strengthening and improved use. Pt's goals will be addressed at this time to reflect their progress in therapy. Patient will continue to benefit from skilled physical therapy to further address their deficits in strength and shoulder stability in order to improve upon their ability to perform ADLs and daily tasks w/o restrictions.      Goals  Plan Goals: SHOULDER  PROBLEMS:      1. Carrying, Moving, and Handling Objects Functional Limitation                               LTG 1: 12 weeks:  The patient will demonstrate 9 % limitation by achieving a score of 15 on the QuickDASH.                          STATUS:  not met              STG 1a: 6 weeks:  The patient will demonstrate 15 % limitation by achieving a score of 20 on the QuickDASH.                            STATUS:  not met              TREATMENT:  Manual therapy, therapeutic exercise, home exercise instruction, and modalities as needed to include: moist heat, electrical stimulation, and ultrasound.      2. The patient has limited strength of the L shoulder.              LTG 2: 12 weeks:  The patient will demonstrate 4+/5 strength for L shoulder flexion, abduction, external rotation, and internal rotation in order to demonstrate improved shoulder stability.                          STATUS:  progressing              STG  2a: 8 weeks:  The patient will demonstrate 4/5 strength for L shoulder flexion, abduction, external rotation, and internal rotation.                          STATUS:  met              STG2b:  2 weeks:  The patient will be independent with home exercises.                           STATUS:  progressing              TREATMENT: Manual therapy, therapeutic exercise, home exercise instruction, and modalities as needed to include: electrical stimulation, ultrasound, moist heat, and ice.                3. The patient complains of pain to the L shoulder.              LTG 3: 12 weeks:  The patient will report a pain rating of 1/10 or better in order to improve sleep quality and tolerance to performance of activities of daily living.                          STATUS:  progressing              STG 3a: 8 weeks:  The patient will report a pain rating of 3/10 or better.                           STATUS:  met              TREATMENT: Manual therapy, therapeutic exercise, home exercise instruction, and modalities as needed to include: electrical stimulation, ultrasound, moist heat, and ice.    Progress toward previous goals: Partially Met      Recommendations: Continue as planned  Timeframe: 2 a week, for 2 months   Prognosis to achieve goals: good    PT Signature: Danny Singh PT, DPT    Electronically signed 4/10/2024    [place] License: PT - [id number]         Timed:  Manual Therapy:    0     mins  14684;  Therapeutic Exercise:    30     mins  44081;     Neuromuscular Meron:    0    mins  05200;    Therapeutic Activity:     0     mins  69594;     Gait Trainin     mins  90154;     Aquatics                         0      mins  47883    Un-timed:  Mechanical Traction      0     mins  39546  Dry Needling     0     mins self-pay  Electrical Stimulation:    0     mins  36664 ( );    Timed Treatment:   30   mins   Total Treatment:     30   mins

## 2024-04-15 ENCOUNTER — OFFICE VISIT (OUTPATIENT)
Dept: PODIATRY | Facility: CLINIC | Age: 79
End: 2024-04-15
Payer: MEDICARE

## 2024-04-15 VITALS
DIASTOLIC BLOOD PRESSURE: 53 MMHG | SYSTOLIC BLOOD PRESSURE: 127 MMHG | BODY MASS INDEX: 29.84 KG/M2 | TEMPERATURE: 97.1 F | HEART RATE: 90 BPM | OXYGEN SATURATION: 100 % | WEIGHT: 152 LBS | HEIGHT: 60 IN

## 2024-04-15 DIAGNOSIS — L89.891 PRESSURE INJURY OF RIGHT FOOT, STAGE 1: ICD-10-CM

## 2024-04-15 DIAGNOSIS — M20.42 HAMMER TOES OF BOTH FEET: Primary | ICD-10-CM

## 2024-04-15 DIAGNOSIS — M20.12 VALGUS DEFORMITY OF BOTH GREAT TOES: ICD-10-CM

## 2024-04-15 DIAGNOSIS — M20.41 HAMMER TOES OF BOTH FEET: Primary | ICD-10-CM

## 2024-04-15 DIAGNOSIS — B35.1 ONYCHOMYCOSIS: ICD-10-CM

## 2024-04-15 DIAGNOSIS — L60.0 ONYCHOCRYPTOSIS: ICD-10-CM

## 2024-04-15 DIAGNOSIS — M79.671 FOOT PAIN, BILATERAL: ICD-10-CM

## 2024-04-15 DIAGNOSIS — M79.672 FOOT PAIN, BILATERAL: ICD-10-CM

## 2024-04-15 DIAGNOSIS — M20.11 VALGUS DEFORMITY OF BOTH GREAT TOES: ICD-10-CM

## 2024-04-15 DIAGNOSIS — L89.891 PRESSURE INJURY OF TOE OF RIGHT FOOT, STAGE 1: ICD-10-CM

## 2024-04-15 PROCEDURE — 11721 DEBRIDE NAIL 6 OR MORE: CPT | Performed by: PODIATRIST

## 2024-04-15 PROCEDURE — 97597 DBRDMT OPN WND 1ST 20 CM/<: CPT | Performed by: PODIATRIST

## 2024-04-15 PROCEDURE — 1160F RVW MEDS BY RX/DR IN RCRD: CPT | Performed by: PODIATRIST

## 2024-04-15 PROCEDURE — 1159F MED LIST DOCD IN RCRD: CPT | Performed by: PODIATRIST

## 2024-04-15 NOTE — PROGRESS NOTES
[facility] - PODIATRY    Today's Date: 04/15/24    Patient Name: Erik Bhatia  MRN: [id number]  CSN: [id number]  PCP: Marco Antonio Leonard MD, last PCP visit: 4/9/2024  Referring Provider: No ref. provider found    SUBJECTIVE     Chief Complaint   Patient presents with    Left Foot - Follow-up, Nail Problem    Right Foot - Follow-up, Nail Problem     HPI: Erik Bhatia, a 79 y.o.female, comes presents to clinic with chief complaint of:    New, Established, New Problem: Established    Location:  hyperkeratotic lesion(s) on dorsal right second proximal interphalangeal joint    Duration: 2015    Onset:  gradual    Nature:  sore    Stable, worsening, improving: recurring    Aggravating factors:  Patient reports lesion(s) is painful with shoegear and ambulation.      Previous Treatment:   Debridement  ---------------------------  New, Established, New Problem: est    Location: Toenails    Duration:  Greater than five years    Onset: Gradual    Nature: sore with palpation.    Stable, worsening, improving: stable    Aggravating factors: Pain with shoe gear and ambulation.    Previous Treatment:  Debridement    Medical changes:  none    Patient denies any fevers, chills, nausea, vomiting, shortness of breathe, nor any other constitutional signs nor symptoms.    I have reviewed/confirmed previously documented HPI with no changes.       Past Medical History:   Diagnosis Date    ADHD (attention deficit hyperactivity disorder) about 2000    Allergic ?    Anemia ?    Ankle sprain Long time ago    Arthritis     Arthritis of back ?    Asthma     Bladder disorder     Bunion     Callus ?    Chronic allergic rhinitis     Corns and callus     CTS (carpal tunnel syndrome) 2002    Had surgeries in 2003 and 204    GERD (gastroesophageal reflux disease)     Hammertoe     Ingrown toenail     Limb pain     Limb swelling     Mixed hyperlipidemia 08/10/2022    Numbness in feet     Periarthritis of shoulder 2024    SOB  (shortness of breath)      Past Surgical History:   Procedure Laterality Date    BREAST BIOPSY      CARPAL TUNNEL RELEASE      COLONOSCOPY   and before    ENDOSCOPY      HAND SURGERY      Carpal tunnel listed anove    TRIGGER FINGER RELEASE      TRIGGER POINT INJECTION      Bilateral thumb releases in      Family History   Problem Relation Age of Onset    Stomach cancer Father     Diabetes Father             Asthma Father     Cancer Father         stomach    Heart disease Mother     Diabetes Maternal Grandfather          long ago    Breast cancer Neg Hx     Ovarian cancer Neg Hx     Uterine cancer Neg Hx     Colon cancer Neg Hx      Social History     Socioeconomic History    Marital status:    Tobacco Use    Smoking status: Never     Passive exposure: Never    Smokeless tobacco: Never    Tobacco comments:     none   Vaping Use    Vaping status: Never Used   Substance and Sexual Activity    Alcohol use: Yes     Alcohol/week: 3.0 - 4.0 standard drinks of alcohol     Types: 3 - 4 Glasses of wine per week     Comment: Not every week    Drug use: Never    Sexual activity: Not Currently     Partners: Male     Birth control/protection: I.U.D., Birth control pill, Pill     Comment: All birth control and sexual partner info from  past     Allergies   Allergen Reactions    Seasonal Ic [Kelp-B6-Lecithin-Vinegar] Cough    Nitrofurantoin Provider Review Needed     Current Outpatient Medications   Medication Sig Dispense Refill    albuterol sulfate  (90 Base) MCG/ACT inhaler Inhale 2 puffs Every 4 (Four) Hours As Needed for Wheezing. 18 g 1    aluminum hydroxide-magnesium carbonate (Gaviscon)  MG/15ML suspension oral suspension 1 mL.      budesonide-formoterol (Symbicort) 160-4.5 MCG/ACT inhaler Inhale 2 puffs 2 (Two) Times a Day for 90 days. 30.6 g 3    cetirizine-pseudoephedrine (ZyrTEC-D) 5-120 MG per 12 hr tablet Take 2 tablets by mouth Daily. 180 tablet 1    Cholecalciferol  125 MCG (5000 UT) tablet 1 tablet Daily.      Diclofenac Sodium (VOLTAREN) 1 % gel gel Apply 4 g topically to the appropriate area as directed 4 (Four) Times a Day As Needed (right shoulder pain). 150 g 1    estradiol (ESTRACE) 0.1 MG/GM vaginal cream Insert 1 g into the vagina 2 (Two) Times a Week. 42.5 g 3    fluticasone (Flonase) 50 MCG/ACT nasal spray 2 sprays into the nostril(s) as directed by provider Daily. 45 mL 3    lisdexamfetamine (Vyvanse) 50 MG capsule Take 1 capsule by mouth Every Morning for 30 days 30 capsule 0    meloxicam (Mobic) 15 MG tablet Take 1 tablet by mouth Daily. 30 tablet 0    montelukast (SINGULAIR) 10 MG tablet Take 1 tablet by mouth Daily. 90 tablet 3    olopatadine (PATADAY) 0.2 % solution ophthalmic solution 1 drop.      omeprazole (priLOSEC) 20 MG capsule Take 1 capsule by mouth Daily. 90 capsule 3    potassium chloride (MICRO-K) 10 MEQ CR capsule Take 2 capsules by mouth Daily. 180 capsule 3    silver sulfadiazine (SILVADENE, SSD) 1 % cream APPLY TOPICALLY TWICE A DAY TO THE APPROPRIATE AREA AS DIRECTED 25 g 5    triamcinolone (KENALOG) 0.1 % ointment APPLY TO AFFECTED AREA(S) THREE TIMES A DAY AS DIRECTED 30 g 1     No current facility-administered medications for this visit.     Review of Systems   Constitutional: Negative.    Skin:         Painful hyperkeratotic lesion and toenails   All other systems reviewed and are negative.      OBJECTIVE     Vitals:    04/15/24 1059   BP: 127/53   Pulse: 90   Temp: 97.1 °F (36.2 °C)   SpO2: 100%         Body mass index is 29.69 kg/m².    Lab Results   Component Value Date    GLUCOSE 94 04/05/2024    CALCIUM 9.7 04/05/2024     04/05/2024    K 4.1 04/05/2024    CO2 27.0 04/05/2024     04/05/2024    BUN 22 04/05/2024    CREATININE 0.82 04/05/2024    EGFRIFNONA 79 09/10/2021    BCR 26.8 (H) 04/05/2024    ANIONGAP 9.0 04/05/2024       Patient seen in no apparent distress.      PHYSICAL EXAM:     Foot/Ankle  Exam    GENERAL  Appearance:  appears stated age and elderly  Orientation:  AAOx3  Affect:  appropriate  Gait:  unimpaired  Assistance:  independent  Right shoe gear: casual shoe  Left shoe gear: casual shoe    VASCULAR     Right Foot Vascularity   Normal vascular exam    Dorsalis pedis:  2+  Posterior tibial:  2+  Skin temperature:  warm  Edema grading:  None  CFT:  < 3 seconds  Pedal hair growth:  Present  Varicosities:  none     Left Foot Vascularity   Normal vascular exam    Dorsalis pedis:  2+  Posterior tibial:  2+  Skin temperature:  warm  Edema grading:  None  CFT:  < 3 seconds  Pedal hair growth:  Present  Varicosities:  none     NEUROLOGIC     Right Foot Neurologic   Normal sensation    Light touch sensation: normal  Vibratory sensation: normal  Hot/Cold sensation: normal  Protective Sensation using [place] Monofilament:   Sites intact: 10  Sites tested: 10     Left Foot Neurologic   Normal sensation    Light touch sensation: normal  Vibratory sensation: normal  Hot/Cold sensation:  normal  Protective Sensation using [place]h Monofilament:   Sites intact: 10  Sites tested: 10    MUSCULOSKELETAL     Right Foot Musculoskeletal   Hammertoe:  Second toe, third toe, fourth toe and fifth toe  Hallux valgus: Yes       Left Foot Musculoskeletal   Hammertoe:  Second toe, third toe, fourth toe and fifth toe  Hallux valgus: Yes      MUSCLE STRENGTH     Right Foot Muscle Strength   Foot dorsiflexion:  4  Foot plantar flexion:  4  Foot inversion:  4  Foot eversion:  4     Left Foot Muscle Strength   Foot dorsiflexion:  4  Foot plantar flexion:  4  Foot inversion:  4  Foot eversion:  4    RANGE OF MOTION     Right Foot Range of Motion   Foot and ankle ROM within normal limits       Left Foot Range of Motion   Foot and ankle ROM within normal limits      DERMATOLOGIC      Right Foot Dermatologic   Skin  Positive for ulcer.   Nails  1.  Positive for elongated, onychomycosis, abnormal thickness, subungual  debris and ingrown toenail.  2.  Positive for elongated, onychomycosis, abnormal thickness, subungual debris and ingrown toenail.  3.  Positive for elongated, onychomycosis, abnormal thickness, subungual debris and ingrown toenail.  4.  Positive for elongated, onychomycosis, abnormal thickness, subungual debris and ingrown toenail.  5.  Positive for elongated, onychomycosis, abnormal thickness, subungual debris and ingrown toenail.     Left Foot Dermatologic   Skin  Left foot skin is intact.   Nails  1.  Positive for elongated, onychomycosis, abnormal thickness, subungual debris and ingrown toenail.  2.  Positive for elongated, onychomycosis, abnormal thickness, subungual debris and ingrown toenail.  3.  Positive for elongated, onychomycosis, abnormal thickness, subungual debris and ingrown toenail.  4.  Positive for elongated, onychomycosis, abnormally thick, subungual debris and ingrown toenail.  5.  Positive for elongated, onychomycosis, abnormally thick, subungual debris and ingrown toenail.     Right foot additional comments: Stage 1 ulceration on the dorsal right second PIP joint area of the foot.  Hyperkeratotic tissue with subepidermal hemosiderin deposition is present.  No surrounding, edema, erythema, lymphangitis, fluctuance, nor signs of infection.  No drainage present.  11 mm x 9 mm x 3 mm.  This area was debrided via excisional partial thickness debridement with a 15 scalpel blade.  Post debridement measurements were 9 mm x 7 mm x 1 mm in depth.      I have reexamined the patient the results are consistent with the previously documented exam.        ASSESSMENT/PLAN     Diagnoses and all orders for this visit:    1. Hammer toes of both feet (Primary)    2. Onychomycosis    3. Valgus deformity of both great toes    4. Pressure injury of toe of right foot, stage 1    5. Onychocryptosis    6. Pressure injury of right foot, stage 1    7. Foot pain, bilateral    Comprehensive lower extremity examination and  evaluation was performed.    Discussed findings and treatment plan including risks, benefits, and treatment options with patient in detail. Patient agreed with treatment plan.    Toenails 1 through 5 bilaterally were debrided in thickness and length with toenail tremors.  These were then smoothed with a dermal sanding bur.  Patient tolerated the procedure well.    An After Visit Summary was printed and given to the patient at discharge, including (if requested) any available informative/educational handouts regarding diagnosis, treatment, or medications. All questions were answered to patient/family satisfaction. Should symptoms fail to improve or worsen they agree to call or return to clinic or to go to the Emergency Department. Discussed the importance of following up with any needed screening tests/labs/specialist appointments and any requested follow-up recommended by me today. Importance of maintaining follow-up discussed and patient accepts that missed appointments can delay diagnosis and potentially lead to worsening of conditions.    Return in about 9 weeks (around 6/17/2024) for Toenail Care, Ulcer Follow-Up., or sooner if acute issues arise.    I have reviewed the assessment and plan and verified the accuracy of it. No changes to assessment and plan since the information was documented. Rian Jo DPM 04/15/24     I have dictated this note utilizing Dragon Dictation.  Please note that portions of this note were completed with a voice recognition program.  Part of this note may be an electronic transcription/translation of spoken language to printed text using the Dragon Dictation System.        This document has been electronically signed by Rian Jo DPM on April 15, 2024 11:06 EDT

## 2024-04-17 ENCOUNTER — TREATMENT (OUTPATIENT)
Dept: PHYSICAL THERAPY | Facility: CLINIC | Age: 79
End: 2024-04-17
Payer: MEDICARE

## 2024-04-17 DIAGNOSIS — M25.512 CHRONIC LEFT SHOULDER PAIN: Primary | ICD-10-CM

## 2024-04-17 DIAGNOSIS — M25.612 DECREASED ROM OF LEFT SHOULDER: ICD-10-CM

## 2024-04-17 DIAGNOSIS — M62.81 MUSCLE WEAKNESS OF LEFT UPPER EXTREMITY: ICD-10-CM

## 2024-04-17 DIAGNOSIS — G89.29 CHRONIC LEFT SHOULDER PAIN: Primary | ICD-10-CM

## 2024-04-17 DIAGNOSIS — R26.89 BALANCE PROBLEM: ICD-10-CM

## 2024-04-17 NOTE — PROGRESS NOTES
Physical Therapy Daily Treatment Note/Discharge Summary  [facility]: [address] [place] [address]      Patient: Erik Bhatia   : 1945  Diagnosis/ICD-10 Code:  Chronic left shoulder pain [M25.512, G89.29]  Referring practitioner: Kareem Guerrero MD  Date of Initial Visit: Type: THERAPY  Noted: 3/13/2024  Today's Date: 2024  Patient seen for 5 sessions           Subjective   The patient reported they started to perform their HEP, though not as often. Pt reports their L shoulder does not seem to bother them. Pt feels ready to discharge today.     Objective   See Exercise, Manual, and Modality Logs for complete treatment.     Assessment/Plan  Pt continues to have no pain in their L shoulder. Discussed w/ pt about methods to remind themself to perform exs. Pt's goals will be addressed per their last progress note. Pt will be discharged w/ their HEP.     Goals  Plan Goals: SHOULDER  PROBLEMS:      1. Carrying, Moving, and Handling Objects Functional Limitation                               LTG 1: 12 weeks:  The patient will demonstrate 9 % limitation by achieving a score of 15 on the QuickDASH.                          STATUS:  not met              STG 1a: 6 weeks:  The patient will demonstrate 15 % limitation by achieving a score of 20 on the QuickDASH.                            STATUS:  not met              TREATMENT:  Manual therapy, therapeutic exercise, home exercise instruction, and modalities as needed to include: moist heat, electrical stimulation, and ultrasound.      2. The patient has limited strength of the L shoulder.              LTG 2: 12 weeks:  The patient will demonstrate 4+/5 strength for L shoulder flexion, abduction, external rotation, and internal rotation in order to demonstrate improved shoulder stability.                          STATUS:  not met              STG 2a: 8 weeks:  The patient will demonstrate 4/5 strength for L shoulder flexion, abduction, external  rotation, and internal rotation.                          STATUS:  met              STG2b:  2 weeks:  The patient will be independent with home exercises.                           STATUS:  met               TREATMENT: Manual therapy, therapeutic exercise, home exercise instruction, and modalities as needed to include: electrical stimulation, ultrasound, moist heat, and ice.                3. The patient complains of pain to the L shoulder.              LTG 3: 12 weeks:  The patient will report a pain rating of 1/10 or better in order to improve sleep quality and tolerance to performance of activities of daily living.                          STATUS:  met              STG 3a: 8 weeks:  The patient will report a pain rating of 3/10 or better.                           STATUS:  met              TREATMENT: Manual therapy, therapeutic exercise, home exercise instruction, and modalities as needed to include: electrical stimulation, ultrasound, moist heat, and ice.     Progress toward previous goals: Partially Met       Timed:  Manual Therapy:    0     mins  04801;  Therapeutic Exercise:    15     mins  80062;     Neuromuscular Meron:   0    mins  95716;    Therapeutic Activity:     8     mins  06056;     Gait Trainin     mins  48618;     Aquatics                         0      mins  61486    Un-timed:  Mechanical Traction      0     mins  13527  Electrical Stimulation:    0     mins  56935 ( );      Timed Treatment:   23   mins   Total Treatment:     23   mins    Danny Singh PT, DPT    Electronically signed 2024    [place] License: PT - [id number]

## 2024-04-26 DIAGNOSIS — F98.8 ATTENTION DEFICIT DISORDER (ADD) WITHOUT HYPERACTIVITY: ICD-10-CM

## 2024-04-26 NOTE — TELEPHONE ENCOUNTER
Caller: Erik Bhatia    Relationship: Self    Best call back number: [phone]     Requested Prescriptions:   Requested Prescriptions     Pending Prescriptions Disp Refills    lisdexamfetamine (Vyvanse) 50 MG capsule 30 capsule 0     Sig: Take 1 capsule by mouth Every Morning for 30 days        Pharmacy where request should be sent: [facility] [id number]  [place] - [address] AT [facility] [address]  [phone] [facility] [phone]      Last office visit with prescribing clinician: 4/9/2024   Last telemedicine visit with prescribing clinician: Visit date not found   Next office visit with prescribing clinician: 10/8/2024     Additional details provided by patient: PATIENT HAS ENOUGH MEDICATION TO GET THROUGH THE WEEKEND.     Does the patient have less than a 3 day supply:  [] Yes  [x] No    Would you like a call back once the refill request has been completed: [] Yes [] No    If the office needs to give you a call back, can they leave a voicemail: [] Yes [] No    Rosanna Zhong Rep   04/26/24 13:45 EDT

## 2024-04-30 RX ORDER — LISDEXAMFETAMINE DIMESYLATE 50 MG/1
50 CAPSULE ORAL EVERY MORNING
Qty: 30 CAPSULE | Refills: 0 | Status: SHIPPED | OUTPATIENT
Start: 2024-04-30 | End: 2024-05-30

## 2024-06-13 DIAGNOSIS — F98.8 ATTENTION DEFICIT DISORDER (ADD) WITHOUT HYPERACTIVITY: ICD-10-CM

## 2024-06-13 RX ORDER — LISDEXAMFETAMINE DIMESYLATE 50 MG/1
50 CAPSULE ORAL EVERY MORNING
Qty: 30 CAPSULE | Refills: 0 | Status: SHIPPED | OUTPATIENT
Start: 2024-06-13 | End: 2024-07-13

## 2024-06-13 NOTE — TELEPHONE ENCOUNTER
Caller: Erik Bhatia    Relationship: Self    Best call back number: [phone]     Requested Prescriptions:   Requested Prescriptions     Pending Prescriptions Disp Refills    lisdexamfetamine (Vyvanse) 50 MG capsule 30 capsule 0     Sig: Take 1 capsule by mouth Every Morning for 30 days        Pharmacy where request should be sent: [facility] [id number]  [place] - [address] AT [facility] [address]  [phone] [facility] [phone] FX     Last office visit with prescribing clinician: 4/9/2024   Last telemedicine visit with prescribing clinician: Visit date not found   Next office visit with prescribing clinician: 10/8/2024     Additional details provided by patient:     Does the patient have less than a 3 day supply:  [x] Yes  [] No    Would you like a call back once the refill request has been completed: [] Yes [] No    If the office needs to give you a call back, can they leave a voicemail: [] Yes [] No    Tila Sandy, PCT   06/13/24 15:25 EDT

## 2024-06-14 RX ORDER — BENZONATATE 100 MG/1
CAPSULE ORAL
Qty: 30 CAPSULE | Refills: 1 | OUTPATIENT
Start: 2024-06-14

## 2024-07-08 ENCOUNTER — OFFICE VISIT (OUTPATIENT)
Dept: PODIATRY | Facility: CLINIC | Age: 79
End: 2024-07-08
Payer: MEDICARE

## 2024-07-08 VITALS
SYSTOLIC BLOOD PRESSURE: 147 MMHG | DIASTOLIC BLOOD PRESSURE: 74 MMHG | HEIGHT: 60 IN | OXYGEN SATURATION: 95 % | HEART RATE: 91 BPM | BODY MASS INDEX: 29.45 KG/M2 | WEIGHT: 150 LBS | TEMPERATURE: 98.6 F

## 2024-07-08 DIAGNOSIS — M20.11 VALGUS DEFORMITY OF BOTH GREAT TOES: ICD-10-CM

## 2024-07-08 DIAGNOSIS — L89.891 PRESSURE INJURY OF RIGHT FOOT, STAGE 1: ICD-10-CM

## 2024-07-08 DIAGNOSIS — M79.672 FOOT PAIN, BILATERAL: ICD-10-CM

## 2024-07-08 DIAGNOSIS — M79.671 FOOT PAIN, RIGHT: ICD-10-CM

## 2024-07-08 DIAGNOSIS — M20.12 VALGUS DEFORMITY OF BOTH GREAT TOES: ICD-10-CM

## 2024-07-08 DIAGNOSIS — M20.42 HAMMER TOES OF BOTH FEET: Primary | ICD-10-CM

## 2024-07-08 DIAGNOSIS — M79.671 FOOT PAIN, BILATERAL: ICD-10-CM

## 2024-07-08 DIAGNOSIS — L60.0 ONYCHOCRYPTOSIS: ICD-10-CM

## 2024-07-08 DIAGNOSIS — M20.41 HAMMER TOES OF BOTH FEET: Primary | ICD-10-CM

## 2024-07-08 DIAGNOSIS — B35.1 ONYCHOMYCOSIS: ICD-10-CM

## 2024-07-08 PROCEDURE — 97597 DBRDMT OPN WND 1ST 20 CM/<: CPT | Performed by: PODIATRIST

## 2024-07-08 PROCEDURE — 1160F RVW MEDS BY RX/DR IN RCRD: CPT | Performed by: PODIATRIST

## 2024-07-08 PROCEDURE — 1159F MED LIST DOCD IN RCRD: CPT | Performed by: PODIATRIST

## 2024-07-08 PROCEDURE — 11721 DEBRIDE NAIL 6 OR MORE: CPT | Performed by: PODIATRIST

## 2024-07-08 NOTE — PROGRESS NOTES
[facility] - PODIATRY    Today's Date: 07/08/24    Patient Name: Erik Bhatia  MRN: [id number]  CSN: [id number]  PCP: Marco Antonio Leonard MD, last PCP visit: 4/9/2024  Referring Provider: No ref. provider found    SUBJECTIVE     Chief Complaint   Patient presents with    Left Foot - Follow-up, Nail Problem    Right Foot - Follow-up, Nail Problem     HPI: Erik Bhatia, a 79 y.o.female, comes presents to clinic with chief complaint of:    New, Established, New Problem: Established    Location:  hyperkeratotic lesion(s) on dorsal right second proximal interphalangeal joint    Duration: 2015    Onset:  gradual    Nature:  sore    Stable, worsening, improving: recurring    Aggravating factors:  Patient reports lesion(s) is painful with shoegear and ambulation.      Previous Treatment:   Debridement  ---------------------------  New, Established, New Problem: est    Location: Toenails    Duration:  Greater than five years    Onset: Gradual    Nature: sore with palpation.    Stable, worsening, improving: stable    Aggravating factors: Pain with shoe gear and ambulation.    Previous Treatment:  Debridement    Medical changes:  none    Patient denies any fevers, chills, nausea, vomiting, shortness of breathe, nor any other constitutional signs nor symptoms.    I have reviewed/confirmed previously documented HPI with no changes.       Past Medical History:   Diagnosis Date    ADHD (attention deficit hyperactivity disorder) about 2000    Allergic ?    Anemia ?    Ankle sprain Long time ago    Arthritis     Arthritis of back ?    Asthma     Bladder disorder     Bunion     Callus ?    Chronic allergic rhinitis     Corns and callus     CTS (carpal tunnel syndrome) 2002    Had surgeries in 2003 and 204    GERD (gastroesophageal reflux disease)     Hammertoe     Ingrown toenail     Limb pain     Limb swelling     Mixed hyperlipidemia 08/10/2022    Numbness in feet     Periarthritis of shoulder 2024    SOB  (shortness of breath)      Past Surgical History:   Procedure Laterality Date    BREAST BIOPSY      CARPAL TUNNEL RELEASE      COLONOSCOPY   and before    ENDOSCOPY      HAND SURGERY      Carpal tunnel listed anove    TRIGGER FINGER RELEASE      TRIGGER POINT INJECTION      Bilateral thumb releases in      Family History   Problem Relation Age of Onset    Stomach cancer Father     Diabetes Father             Asthma Father     Cancer Father         stomach    Heart disease Mother     Diabetes Maternal Grandfather          long ago    Breast cancer Neg Hx     Ovarian cancer Neg Hx     Uterine cancer Neg Hx     Colon cancer Neg Hx      Social History     Socioeconomic History    Marital status:    Tobacco Use    Smoking status: Never     Passive exposure: Never    Smokeless tobacco: Never    Tobacco comments:     none   Vaping Use    Vaping status: Never Used   Substance and Sexual Activity    Alcohol use: Yes     Alcohol/week: 3.0 - 4.0 standard drinks of alcohol     Types: 3 - 4 Glasses of wine per week     Comment: Not every week    Drug use: Never    Sexual activity: Not Currently     Partners: Male     Birth control/protection: I.U.D., Birth control pill, Pill     Comment: All birth control and sexual partner info from  past     Allergies   Allergen Reactions    Seasonal Ic [Kelp-B6-Lecithin-Vinegar] Cough    Nitrofurantoin Provider Review Needed     Current Outpatient Medications   Medication Sig Dispense Refill    albuterol sulfate  (90 Base) MCG/ACT inhaler Inhale 2 puffs Every 4 (Four) Hours As Needed for Wheezing. 18 g 1    aluminum hydroxide-magnesium carbonate (Gaviscon)  MG/15ML suspension oral suspension 1 mL.      budesonide-formoterol (Symbicort) 160-4.5 MCG/ACT inhaler Inhale 2 puffs 2 (Two) Times a Day for 90 days. 30.6 g 3    cetirizine-pseudoephedrine (ZyrTEC-D) 5-120 MG per 12 hr tablet Take 2 tablets by mouth Daily. 180 tablet 1    Cholecalciferol  125 MCG (5000 UT) tablet 1 tablet Daily.      Diclofenac Sodium (VOLTAREN) 1 % gel gel Apply 4 g topically to the appropriate area as directed 4 (Four) Times a Day As Needed (right shoulder pain). 150 g 1    fluticasone (Flonase) 50 MCG/ACT nasal spray 2 sprays into the nostril(s) as directed by provider Daily. 45 mL 3    lisdexamfetamine (Vyvanse) 50 MG capsule Take 1 capsule by mouth Every Morning for 30 days 30 capsule 0    meloxicam (Mobic) 15 MG tablet Take 1 tablet by mouth Daily. 30 tablet 0    montelukast (SINGULAIR) 10 MG tablet Take 1 tablet by mouth Daily. 90 tablet 3    olopatadine (PATADAY) 0.2 % solution ophthalmic solution 1 drop.      omeprazole (priLOSEC) 20 MG capsule Take 1 capsule by mouth Daily. 90 capsule 3    potassium chloride (MICRO-K) 10 MEQ CR capsule Take 2 capsules by mouth Daily. 180 capsule 3    silver sulfadiazine (SILVADENE, SSD) 1 % cream APPLY TOPICALLY TWICE A DAY TO THE APPROPRIATE AREA AS DIRECTED 25 g 5    triamcinolone (KENALOG) 0.1 % ointment APPLY TO AFFECTED AREA(S) THREE TIMES A DAY AS DIRECTED 30 g 1     No current facility-administered medications for this visit.     Review of Systems   Constitutional: Negative.    Skin:         Painful hyperkeratotic lesion and toenails   All other systems reviewed and are negative.      OBJECTIVE     Vitals:    07/08/24 1101   BP: 147/74   Pulse: 91   Temp: 98.6 °F (37 °C)   SpO2: 95%         Body mass index is 29.29 kg/m².    Lab Results   Component Value Date    GLUCOSE 94 04/05/2024    CALCIUM 9.7 04/05/2024     04/05/2024    K 4.1 04/05/2024    CO2 27.0 04/05/2024     04/05/2024    BUN 22 04/05/2024    CREATININE 0.82 04/05/2024    EGFRIFNONA 79 09/10/2021    BCR 26.8 (H) 04/05/2024    ANIONGAP 9.0 04/05/2024       Patient seen in no apparent distress.      PHYSICAL EXAM:     Foot/Ankle Exam    GENERAL  Appearance:  appears stated age and elderly  Orientation:  AAOx3  Affect:  appropriate  Gait:  unimpaired  Assistance:   independent  Right shoe gear: sandal  Left shoe gear: sandal    VASCULAR     Right Foot Vascularity   Normal vascular exam    Dorsalis pedis:  2+  Posterior tibial:  2+  Skin temperature:  warm  Edema grading:  None  CFT:  < 3 seconds  Pedal hair growth:  Present  Varicosities:  none     Left Foot Vascularity   Normal vascular exam    Dorsalis pedis:  2+  Posterior tibial:  2+  Skin temperature:  warm  Edema grading:  None  CFT:  < 3 seconds  Pedal hair growth:  Present  Varicosities:  none     NEUROLOGIC     Right Foot Neurologic   Normal sensation    Light touch sensation: normal  Vibratory sensation: normal  Hot/Cold sensation: normal  Protective Sensation using Arcadia-Ayush Monofilament:   Sites intact: 10  Sites tested: 10     Left Foot Neurologic   Normal sensation    Light touch sensation: normal  Vibratory sensation: normal  Hot/Cold sensation:  normal  Protective Sensation using Arcadia-Ayush Monofilament:   Sites intact: 10  Sites tested: 10    MUSCULOSKELETAL     Right Foot Musculoskeletal   Hammertoe:  Second toe, third toe, fourth toe and fifth toe  Hallux valgus: Yes       Left Foot Musculoskeletal   Hammertoe:  Second toe, third toe, fourth toe and fifth toe  Hallux valgus: Yes      MUSCLE STRENGTH     Right Foot Muscle Strength   Foot dorsiflexion:  4  Foot plantar flexion:  4  Foot inversion:  4  Foot eversion:  4     Left Foot Muscle Strength   Foot dorsiflexion:  4  Foot plantar flexion:  4  Foot inversion:  4  Foot eversion:  4    RANGE OF MOTION     Right Foot Range of Motion   Foot and ankle ROM within normal limits       Left Foot Range of Motion   Foot and ankle ROM within normal limits      DERMATOLOGIC      Right Foot Dermatologic   Skin  Positive for ulcer.   Nails  1.  Positive for elongated, onychomycosis, abnormal thickness, subungual debris and ingrown toenail.  2.  Positive for elongated, onychomycosis, abnormal thickness, subungual debris and ingrown toenail.  3.  Positive  for elongated, onychomycosis, abnormal thickness, subungual debris and ingrown toenail.  4.  Positive for elongated, onychomycosis, abnormal thickness, subungual debris and ingrown toenail.  5.  Positive for elongated, onychomycosis, abnormal thickness, subungual debris and ingrown toenail.     Left Foot Dermatologic   Skin  Left foot skin is intact.   Nails  1.  Positive for elongated, onychomycosis, abnormal thickness, subungual debris and ingrown toenail.  2.  Positive for elongated, onychomycosis, abnormal thickness, subungual debris and ingrown toenail.  3.  Positive for elongated, onychomycosis, abnormal thickness, subungual debris and ingrown toenail.  4.  Positive for elongated, onychomycosis, abnormally thick, subungual debris and ingrown toenail.  5.  Positive for elongated, onychomycosis, abnormally thick, subungual debris and ingrown toenail.     Right foot additional comments: Stage 1 ulceration on the dorsal right second PIP joint area of the foot.  Hyperkeratotic tissue with subepidermal hemosiderin deposition is present.  No surrounding, edema, erythema, lymphangitis, fluctuance, nor signs of infection.  No drainage present.  9 mm x 8 mm x 3 mm.  This area was debrided via excisional partial thickness debridement with a 15 scalpel blade.  Post debridement measurements were 8 mm x 6 mm x 1 mm in depth.      I have reexamined the patient the results are consistent with the previously documented exam.    ASSESSMENT/PLAN     Diagnoses and all orders for this visit:    1. Hammer toes of both feet (Primary)    2. Onychomycosis    3. Valgus deformity of both great toes    4. Foot pain, right    5. Foot pain, bilateral    6. Onychocryptosis    7. Pressure injury of right foot, stage 1    Comprehensive lower extremity examination and evaluation was performed.    Discussed findings and treatment plan including risks, benefits, and treatment options with patient in detail. Patient agreed with treatment  plan.    Toenails 1 through 5 bilaterally were debrided in thickness and length with toenail tremors.  These were then smoothed with a dermal sanding bur.  Patient tolerated the procedure well.    An After Visit Summary was printed and given to the patient at discharge, including (if requested) any available informative/educational handouts regarding diagnosis, treatment, or medications. All questions were answered to patient/family satisfaction. Should symptoms fail to improve or worsen they agree to call or return to clinic or to go to the Emergency Department. Discussed the importance of following up with any needed screening tests/labs/specialist appointments and any requested follow-up recommended by me today. Importance of maintaining follow-up discussed and patient accepts that missed appointments can delay diagnosis and potentially lead to worsening of conditions.    Return in about 9 weeks (around 9/9/2024) for Toenail Care, Ulcer Follow-Up., or sooner if acute issues arise.    I have reviewed the assessment and plan and verified the accuracy of it. No changes to assessment and plan since the information was documented. Rian Jo DPM 07/08/24     I have dictated this note utilizing Dragon Dictation.  Please note that portions of this note were completed with a voice recognition program.  Part of this note may be an electronic transcription/translation of spoken language to printed text using the Dragon Dictation System.      This document has been electronically signed by Rian Jo DPM on July 8, 2024 11:38 EDT

## 2024-07-12 DIAGNOSIS — F98.8 ATTENTION DEFICIT DISORDER (ADD) WITHOUT HYPERACTIVITY: ICD-10-CM

## 2024-07-12 RX ORDER — LISDEXAMFETAMINE DIMESYLATE 50 MG/1
50 CAPSULE ORAL EVERY MORNING
Qty: 30 CAPSULE | Refills: 0 | Status: SHIPPED | OUTPATIENT
Start: 2024-07-12 | End: 2024-08-11

## 2024-07-12 NOTE — TELEPHONE ENCOUNTER
Caller: Erik Bhatia    Relationship: Self    Best call back number: [phone]     Requested Prescriptions:   Requested Prescriptions     Pending Prescriptions Disp Refills    lisdexamfetamine (Vyvanse) 50 MG capsule 30 capsule 0     Sig: Take 1 capsule by mouth Every Morning for 30 days        Pharmacy where request should be sent: [facility] [id number]  [facility] [place] [facility] [address] AT [facility] [address]  [phone] [facility] [phone] FX     Last office visit with prescribing clinician: 4/9/2024   Last telemedicine visit with prescribing clinician: Visit date not found   Next office visit with prescribing clinician: 10/8/2024     Additional details provided by patient: PATIENT IS NEEDING A REFILL.    Does the patient have less than a 3 day supply:  [x] Yes  [] No    Would you like a call back once the refill request has been completed: [x] Yes [] No    If the office needs to give you a call back, can they leave a voicemail: [x] Yes [] No    Rosanna Woody   07/12/24 14:02 EDT

## 2024-07-31 RX ORDER — MONTELUKAST SODIUM 10 MG/1
10 TABLET ORAL DAILY
Qty: 90 TABLET | Refills: 3 | Status: SHIPPED | OUTPATIENT
Start: 2024-07-31

## 2024-07-31 RX ORDER — OMEPRAZOLE 20 MG/1
20 CAPSULE, DELAYED RELEASE ORAL DAILY
Qty: 90 CAPSULE | Refills: 3 | Status: SHIPPED | OUTPATIENT
Start: 2024-07-31

## 2024-07-31 RX ORDER — CETIRIZINE HYDROCHLORIDE, PSEUDOEPHEDRINE HYDROCHLORIDE 5; 120 MG/1; MG/1
2 TABLET, FILM COATED, EXTENDED RELEASE ORAL DAILY
Qty: 180 TABLET | Refills: 1 | Status: SHIPPED | OUTPATIENT
Start: 2024-07-31

## 2024-07-31 NOTE — TELEPHONE ENCOUNTER
Caller: JannetteKathleenjavi FRANCO    Relationship: Self    Best call back number:     [phone]       Requested Prescriptions:   Requested Prescriptions     Pending Prescriptions Disp Refills    montelukast (SINGULAIR) 10 MG tablet 90 tablet 3     Sig: Take 1 tablet by mouth Daily.    omeprazole (priLOSEC) 20 MG capsule 90 capsule 3     Sig: Take 1 capsule by mouth Daily.    cetirizine-pseudoephedrine (ZyrTEC-D) 5-120 MG per 12 hr tablet 180 tablet 1     Sig: Take 2 tablets by mouth Daily.        Pharmacy where request should be sent: [facility] [id number] [facility] [place] [facility] [address] AT [facility] [address] - [phone] [facility] [phone]      Last office visit with prescribing clinician: 4/9/2024   Last telemedicine visit with prescribing clinician: Visit date not found   Next office visit with prescribing clinician: 10/8/2024     Additional details provided by patient: OUT OF MONTELUKAST     Does the patient have less than a 3 day supply:  [x] Yes  [] No    Would you like a call back once the refill request has been completed: [] Yes [] No    If the office needs to give you a call back, can they leave a voicemail: [] Yes [] No    Rosanna Poon   07/31/24 14:38 EDT

## 2024-08-15 DIAGNOSIS — F98.8 ATTENTION DEFICIT DISORDER (ADD) WITHOUT HYPERACTIVITY: ICD-10-CM

## 2024-08-15 RX ORDER — CETIRIZINE HYDROCHLORIDE, PSEUDOEPHEDRINE HYDROCHLORIDE 5; 120 MG/1; MG/1
2 TABLET, FILM COATED, EXTENDED RELEASE ORAL DAILY
Qty: 180 TABLET | Refills: 1 | OUTPATIENT
Start: 2024-08-15

## 2024-08-15 NOTE — TELEPHONE ENCOUNTER
Caller: Erik Bhatia    Relationship: Self    Best call back number: [phone]     Requested Prescriptions:   Requested Prescriptions     Pending Prescriptions Disp Refills    lisdexamfetamine (Vyvanse) 50 MG capsule 30 capsule 0     Sig: Take 1 capsule by mouth Every Morning for 30 days    cetirizine-pseudoephedrine (ZyrTEC-D) 5-120 MG per 12 hr tablet 180 tablet 1     Sig: Take 2 tablets by mouth Daily.        Pharmacy where request should be sent: [facility] [id number]  [place] - [address] AT [facility] [address] - [phone] [facility] [phone] FX     Last office visit with prescribing clinician: 4/9/2024   Last telemedicine visit with prescribing clinician: Visit date not found   Next office visit with prescribing clinician: 10/8/2024     Does the patient have less than a 3 day supply:  [x] Yes  [] No    Would you like a call back once the refill request has been completed: [x] Yes [] No    If the office needs to give you a call back, can they leave a voicemail: [x] Yes [] No    Rosanna Perales Rep   08/15/24 13:36 EDT

## 2024-08-19 RX ORDER — LISDEXAMFETAMINE DIMESYLATE 50 MG/1
50 CAPSULE ORAL EVERY MORNING
Qty: 30 CAPSULE | Refills: 0 | Status: SHIPPED | OUTPATIENT
Start: 2024-08-19 | End: 2024-09-18

## 2024-09-24 DIAGNOSIS — F98.8 ATTENTION DEFICIT DISORDER (ADD) WITHOUT HYPERACTIVITY: ICD-10-CM

## 2024-09-24 RX ORDER — LISDEXAMFETAMINE DIMESYLATE 50 MG/1
50 CAPSULE ORAL EVERY MORNING
Qty: 30 CAPSULE | Refills: 0 | Status: SHIPPED | OUTPATIENT
Start: 2024-09-24 | End: 2024-10-24

## 2024-09-30 ENCOUNTER — OFFICE VISIT (OUTPATIENT)
Dept: PODIATRY | Facility: CLINIC | Age: 79
End: 2024-09-30
Payer: MEDICARE

## 2024-09-30 VITALS
OXYGEN SATURATION: 94 % | HEART RATE: 84 BPM | TEMPERATURE: 97.3 F | DIASTOLIC BLOOD PRESSURE: 76 MMHG | HEIGHT: 60 IN | BODY MASS INDEX: 30.23 KG/M2 | SYSTOLIC BLOOD PRESSURE: 128 MMHG | WEIGHT: 154 LBS

## 2024-09-30 DIAGNOSIS — M20.41 HAMMER TOES OF BOTH FEET: ICD-10-CM

## 2024-09-30 DIAGNOSIS — M79.672 FOOT PAIN, BILATERAL: ICD-10-CM

## 2024-09-30 DIAGNOSIS — M79.671 FOOT PAIN, BILATERAL: ICD-10-CM

## 2024-09-30 DIAGNOSIS — M20.12 VALGUS DEFORMITY OF BOTH GREAT TOES: ICD-10-CM

## 2024-09-30 DIAGNOSIS — L60.0 ONYCHOCRYPTOSIS: ICD-10-CM

## 2024-09-30 DIAGNOSIS — M20.42 HAMMER TOES OF BOTH FEET: ICD-10-CM

## 2024-09-30 DIAGNOSIS — B35.1 ONYCHOMYCOSIS: ICD-10-CM

## 2024-09-30 DIAGNOSIS — L89.891 PRESSURE INJURY OF TOE OF RIGHT FOOT, STAGE 1: ICD-10-CM

## 2024-09-30 DIAGNOSIS — M20.11 VALGUS DEFORMITY OF BOTH GREAT TOES: ICD-10-CM

## 2024-09-30 DIAGNOSIS — M79.671 FOOT PAIN, RIGHT: Primary | ICD-10-CM

## 2024-09-30 DIAGNOSIS — L89.891 PRESSURE INJURY OF RIGHT FOOT, STAGE 1: ICD-10-CM

## 2024-09-30 PROCEDURE — 11721 DEBRIDE NAIL 6 OR MORE: CPT | Performed by: PODIATRIST

## 2024-09-30 PROCEDURE — 1159F MED LIST DOCD IN RCRD: CPT | Performed by: PODIATRIST

## 2024-09-30 PROCEDURE — 97597 DBRDMT OPN WND 1ST 20 CM/<: CPT | Performed by: PODIATRIST

## 2024-09-30 PROCEDURE — 1160F RVW MEDS BY RX/DR IN RCRD: CPT | Performed by: PODIATRIST

## 2024-09-30 NOTE — PROGRESS NOTES
[facility] - PODIATRY    Today's Date: 09/30/24    Patient Name: Erik Bhatia  MRN: [id number]  CSN: [id number]  PCP: Marco Antonio Leonard MD, last PCP visit: 4/9/2024  Referring Provider: No ref. provider found    SUBJECTIVE     Chief Complaint   Patient presents with    Left Foot - Follow-up, Nail Problem    Right Foot - Follow-up, Nail Problem     HPI: Erik Bhatia, a 79 y.o.female, comes presents to clinic with chief complaint of:    New, Established, New Problem: Established    Location:  hyperkeratotic lesion(s) on dorsal right second proximal interphalangeal joint    Duration: 2015    Onset:  gradual    Nature:  sore    Stable, worsening, improving: recurring    Aggravating factors:  Patient reports lesion(s) is painful with shoegear and ambulation.      Previous Treatment:   Debridement  ---------------------------  New, Established, New Problem: est    Location: Toenails    Duration:  Greater than five years    Onset: Gradual    Nature: sore with palpation.    Stable, worsening, improving: recurring    Aggravating factors: Pain with shoe gear and ambulation.    Previous Treatment:  Debridement    Medical changes:  no changes    Patient denies any fevers, chills, nausea, vomiting, shortness of breathe, nor any other constitutional signs nor symptoms.    I have reviewed/confirmed previously documented HPI with no changes.     Past Medical History:   Diagnosis Date    ADHD (attention deficit hyperactivity disorder) about 2000    Allergic ?    Anemia ?    Ankle sprain Long time ago    Arthritis     Arthritis of back ?    Asthma     Bladder disorder     Bunion     Callus ?    Chronic allergic rhinitis     Corns and callus     CTS (carpal tunnel syndrome) 2002    Had surgeries in 2003 and 204    GERD (gastroesophageal reflux disease)     Hammertoe     Ingrown toenail     Limb pain     Limb swelling     Mixed hyperlipidemia 08/10/2022    Numbness in feet     Periarthritis of shoulder 2024    SOB  (shortness of breath)      Past Surgical History:   Procedure Laterality Date    BREAST BIOPSY      CARPAL TUNNEL RELEASE      COLONOSCOPY   and before    ENDOSCOPY      HAND SURGERY      Carpal tunnel listed anove    TRIGGER FINGER RELEASE      TRIGGER POINT INJECTION      Bilateral thumb releases in      Family History   Problem Relation Age of Onset    Stomach cancer Father     Diabetes Father             Asthma Father     Cancer Father         stomach    Heart disease Mother     Diabetes Maternal Grandfather          long ago    Breast cancer Neg Hx     Ovarian cancer Neg Hx     Uterine cancer Neg Hx     Colon cancer Neg Hx      Social History     Socioeconomic History    Marital status:    Tobacco Use    Smoking status: Never     Passive exposure: Never    Smokeless tobacco: Never    Tobacco comments:     none   Vaping Use    Vaping status: Never Used   Substance and Sexual Activity    Alcohol use: Yes     Alcohol/week: 3.0 - 4.0 standard drinks of alcohol     Types: 3 - 4 Glasses of wine per week     Comment: Not every week    Drug use: Never    Sexual activity: Not Currently     Partners: Male     Birth control/protection: I.U.D., Birth control pill, Pill     Comment: All birth control and sexual partner info from  past     Allergies   Allergen Reactions    Seasonal Ic [Kelp-B6-Lecithin-Vinegar] Cough    Nitrofurantoin Provider Review Needed     Current Outpatient Medications   Medication Sig Dispense Refill    albuterol sulfate  (90 Base) MCG/ACT inhaler Inhale 2 puffs Every 4 (Four) Hours As Needed for Wheezing. 18 g 1    aluminum hydroxide-magnesium carbonate (Gaviscon)  MG/15ML suspension oral suspension 1 mL.      budesonide-formoterol (Symbicort) 160-4.5 MCG/ACT inhaler Inhale 2 puffs 2 (Two) Times a Day for 90 days. 30.6 g 3    cetirizine-pseudoephedrine (ZyrTEC-D) 5-120 MG per 12 hr tablet Take 2 tablets by mouth Daily. 180 tablet 1    Cholecalciferol  125 MCG (5000 UT) tablet 1 tablet Daily.      Diclofenac Sodium (VOLTAREN) 1 % gel gel Apply 4 g topically to the appropriate area as directed 4 (Four) Times a Day As Needed (right shoulder pain). 150 g 1    fluticasone (Flonase) 50 MCG/ACT nasal spray 2 sprays into the nostril(s) as directed by provider Daily. 45 mL 3    lisdexamfetamine (Vyvanse) 50 MG capsule Take 1 capsule by mouth Every Morning for 30 days 30 capsule 0    meloxicam (Mobic) 15 MG tablet Take 1 tablet by mouth Daily. 30 tablet 0    montelukast (SINGULAIR) 10 MG tablet Take 1 tablet by mouth Daily. 90 tablet 3    olopatadine (PATADAY) 0.2 % solution ophthalmic solution 1 drop.      omeprazole (priLOSEC) 20 MG capsule Take 1 capsule by mouth Daily. 90 capsule 3    potassium chloride (MICRO-K) 10 MEQ CR capsule Take 2 capsules by mouth Daily. 180 capsule 3    silver sulfadiazine (SILVADENE, SSD) 1 % cream APPLY TOPICALLY TWICE A DAY TO THE APPROPRIATE AREA AS DIRECTED 25 g 5    triamcinolone (KENALOG) 0.1 % ointment APPLY TO AFFECTED AREA(S) THREE TIMES A DAY AS DIRECTED 30 g 1     No current facility-administered medications for this visit.     Review of Systems   Constitutional: Negative.    Skin:         Painful hyperkeratotic lesion and toenails   All other systems reviewed and are negative.      OBJECTIVE     Vitals:    09/30/24 1056   BP: 128/76   Pulse: 84   Temp: 97.3 °F (36.3 °C)   SpO2: 94%         Body mass index is 30.08 kg/m².    Lab Results   Component Value Date    GLUCOSE 94 04/05/2024    CALCIUM 9.7 04/05/2024     04/05/2024    K 4.1 04/05/2024    CO2 27.0 04/05/2024     04/05/2024    BUN 22 04/05/2024    CREATININE 0.82 04/05/2024    EGFRIFNONA 79 09/10/2021    BCR 26.8 (H) 04/05/2024    ANIONGAP 9.0 04/05/2024       Patient seen in no apparent distress.      PHYSICAL EXAM:     Foot/Ankle Exam    GENERAL  Appearance:  appears stated age and elderly  Orientation:  AAOx3  Affect:  appropriate  Gait:   unimpaired  Assistance:  independent  Right shoe gear: casual shoe  Left shoe gear: casual shoe    VASCULAR     Right Foot Vascularity   Normal vascular exam    Dorsalis pedis:  2+  Posterior tibial:  2+  Skin temperature:  warm  Edema grading:  None  CFT:  < 3 seconds  Pedal hair growth:  Present  Varicosities:  none     Left Foot Vascularity   Normal vascular exam    Dorsalis pedis:  2+  Posterior tibial:  2+  Skin temperature:  warm  Edema grading:  None  CFT:  < 3 seconds  Pedal hair growth:  Present  Varicosities:  none     NEUROLOGIC     Right Foot Neurologic   Normal sensation    Light touch sensation: normal  Vibratory sensation: normal  Hot/Cold sensation: normal  Protective Sensation using Kooskia-Ayush Monofilament:   Sites intact: 10  Sites tested: 10     Left Foot Neurologic   Normal sensation    Light touch sensation: normal  Vibratory sensation: normal  Hot/Cold sensation:  normal  Protective Sensation using Kooskia-Ayush Monofilament:   Sites intact: 10  Sites tested: 10    MUSCULOSKELETAL     Right Foot Musculoskeletal   Hammertoe:  Second toe, third toe, fourth toe and fifth toe  Hallux valgus: Yes       Left Foot Musculoskeletal   Hammertoe:  Second toe, third toe, fourth toe and fifth toe  Hallux valgus: Yes      MUSCLE STRENGTH     Right Foot Muscle Strength   Foot dorsiflexion:  4  Foot plantar flexion:  4  Foot inversion:  4  Foot eversion:  4     Left Foot Muscle Strength   Foot dorsiflexion:  4  Foot plantar flexion:  4  Foot inversion:  4  Foot eversion:  4    RANGE OF MOTION     Right Foot Range of Motion   Foot and ankle ROM within normal limits       Left Foot Range of Motion   Foot and ankle ROM within normal limits      DERMATOLOGIC      Right Foot Dermatologic   Skin  Positive for ulcer.   Nails  1.  Positive for elongated, onychomycosis, abnormal thickness, subungual debris and ingrown toenail.  2.  Positive for elongated, onychomycosis, abnormal thickness, subungual debris and  ingrown toenail.  3.  Positive for elongated, onychomycosis, abnormal thickness, subungual debris and ingrown toenail.  4.  Positive for elongated, onychomycosis, abnormal thickness, subungual debris and ingrown toenail.  5.  Positive for elongated, onychomycosis, abnormal thickness, subungual debris and ingrown toenail.     Left Foot Dermatologic   Skin  Left foot skin is intact.   Nails  1.  Positive for elongated, onychomycosis, abnormal thickness, subungual debris and ingrown toenail.  2.  Positive for elongated, onychomycosis, abnormal thickness, subungual debris and ingrown toenail.  3.  Positive for elongated, onychomycosis, abnormal thickness, subungual debris and ingrown toenail.  4.  Positive for elongated, onychomycosis, abnormally thick, subungual debris and ingrown toenail.  5.  Positive for elongated, onychomycosis, abnormally thick, subungual debris and ingrown toenail.     Right foot additional comments: Stage 1 ulceration on the dorsal right second PIP joint area of the foot.  Hyperkeratotic tissue with subepidermal hemosiderin deposition is present.  No surrounding, edema, erythema, lymphangitis, fluctuance, nor signs of infection.  No drainage present.  8 mm x 8 mm x 3 mm.  This area was debrided via excisional partial thickness debridement with a 15 scalpel blade.  Post debridement measurements were 7 mm x 6 mm x 1 mm in depth.      I have reexamined the patient the results are consistent with the previously documented exam.    ASSESSMENT/PLAN     Diagnoses and all orders for this visit:    1. Foot pain, right (Primary)    2. Pressure injury of right foot, stage 1    3. Pressure injury of toe of right foot, stage 1    4. Valgus deformity of both great toes    5. Onychomycosis    6. Hammer toes of both feet    7. Foot pain, bilateral    8. Onychocryptosis    Comprehensive lower extremity examination and evaluation was performed.    Discussed findings and treatment plan including risks, benefits,  and treatment options with patient in detail. Patient agreed with treatment plan.    Toenails 1 through 5 bilaterally were debrided in thickness and length with toenail tremors.  These were then smoothed with a dermal sanding bur.  Patient tolerated the procedure well.    An After Visit Summary was printed and given to the patient at discharge, including (if requested) any available informative/educational handouts regarding diagnosis, treatment, or medications. All questions were answered to patient/family satisfaction. Should symptoms fail to improve or worsen they agree to call or return to clinic or to go to the Emergency Department. Discussed the importance of following up with any needed screening tests/labs/specialist appointments and any requested follow-up recommended by me today. Importance of maintaining follow-up discussed and patient accepts that missed appointments can delay diagnosis and potentially lead to worsening of conditions.    Return in about 9 weeks (around 12/2/2024) for Toenail Care, Ulcer Follow-Up., or sooner if acute issues arise.    I have reviewed the assessment and plan and verified the accuracy of it. No changes to assessment and plan since the information was documented. Rian Jo DPM 09/30/24     I have dictated this note utilizing Dragon Dictation.  Please note that portions of this note were completed with a voice recognition program.  Part of this note may be an electronic transcription/translation of spoken language to printed text using the Dragon Dictation System.      This document has been electronically signed by Rian Jo DPM on September 30, 2024 11:02 EDT

## 2024-10-04 ENCOUNTER — LAB (OUTPATIENT)
Dept: INTERNAL MEDICINE | Age: 79
End: 2024-10-04
Payer: MEDICARE

## 2024-10-04 DIAGNOSIS — E78.2 MIXED HYPERLIPIDEMIA: ICD-10-CM

## 2024-10-04 DIAGNOSIS — D50.8 IRON DEFICIENCY ANEMIA DUE TO DIETARY CAUSES: ICD-10-CM

## 2024-10-04 DIAGNOSIS — E55.9 VITAMIN D DEFICIENCY: ICD-10-CM

## 2024-10-04 DIAGNOSIS — Z00.00 MEDICARE ANNUAL WELLNESS VISIT, SUBSEQUENT: ICD-10-CM

## 2024-10-04 DIAGNOSIS — R73.01 IMPAIRED FASTING GLUCOSE: ICD-10-CM

## 2024-10-04 LAB
25(OH)D3 SERPL-MCNC: 27 NG/ML (ref 30–100)
ALBUMIN SERPL-MCNC: 4.2 G/DL (ref 3.5–5.2)
ALBUMIN/GLOB SERPL: 1.3 G/DL
ALP SERPL-CCNC: 94 U/L (ref 39–117)
ALT SERPL W P-5'-P-CCNC: 16 U/L (ref 1–33)
ANION GAP SERPL CALCULATED.3IONS-SCNC: 13.9 MMOL/L (ref 5–15)
AST SERPL-CCNC: 24 U/L (ref 1–32)
BASOPHILS # BLD AUTO: 0.07 10*3/MM3 (ref 0–0.2)
BASOPHILS NFR BLD AUTO: 1.1 % (ref 0–1.5)
BILIRUB SERPL-MCNC: 0.4 MG/DL (ref 0–1.2)
BUN SERPL-MCNC: 18 MG/DL (ref 8–23)
BUN/CREAT SERPL: 25 (ref 7–25)
CALCIUM SPEC-SCNC: 9.8 MG/DL (ref 8.6–10.5)
CHLORIDE SERPL-SCNC: 103 MMOL/L (ref 98–107)
CHOLEST SERPL-MCNC: 211 MG/DL (ref 0–200)
CO2 SERPL-SCNC: 25.1 MMOL/L (ref 22–29)
CREAT SERPL-MCNC: 0.72 MG/DL (ref 0.57–1)
DEPRECATED RDW RBC AUTO: 38.7 FL (ref 37–54)
EGFRCR SERPLBLD CKD-EPI 2021: 85.2 ML/MIN/1.73
EOSINOPHIL # BLD AUTO: 0.35 10*3/MM3 (ref 0–0.4)
EOSINOPHIL NFR BLD AUTO: 5.6 % (ref 0.3–6.2)
ERYTHROCYTE [DISTWIDTH] IN BLOOD BY AUTOMATED COUNT: 12.1 % (ref 12.3–15.4)
FERRITIN SERPL-MCNC: 39.5 NG/ML (ref 13–150)
FOLATE SERPL-MCNC: 7.28 NG/ML (ref 4.78–24.2)
GLOBULIN UR ELPH-MCNC: 3.3 GM/DL
GLUCOSE SERPL-MCNC: 102 MG/DL (ref 65–99)
HBA1C MFR BLD: 5.7 % (ref 4.8–5.6)
HCT VFR BLD AUTO: 38.2 % (ref 34–46.6)
HDLC SERPL-MCNC: 62 MG/DL (ref 40–60)
HGB BLD-MCNC: 12.2 G/DL (ref 12–15.9)
IMM GRANULOCYTES # BLD AUTO: 0.02 10*3/MM3 (ref 0–0.05)
IMM GRANULOCYTES NFR BLD AUTO: 0.3 % (ref 0–0.5)
IRON 24H UR-MRATE: 81 MCG/DL (ref 37–145)
IRON SATN MFR SERPL: 17 % (ref 20–50)
LDLC SERPL CALC-MCNC: 134 MG/DL (ref 0–100)
LDLC/HDLC SERPL: 2.14 {RATIO}
LYMPHOCYTES # BLD AUTO: 2.14 10*3/MM3 (ref 0.7–3.1)
LYMPHOCYTES NFR BLD AUTO: 34.1 % (ref 19.6–45.3)
MCH RBC QN AUTO: 27.9 PG (ref 26.6–33)
MCHC RBC AUTO-ENTMCNC: 31.9 G/DL (ref 31.5–35.7)
MCV RBC AUTO: 87.2 FL (ref 79–97)
MONOCYTES # BLD AUTO: 0.6 10*3/MM3 (ref 0.1–0.9)
MONOCYTES NFR BLD AUTO: 9.6 % (ref 5–12)
NEUTROPHILS NFR BLD AUTO: 3.09 10*3/MM3 (ref 1.7–7)
NEUTROPHILS NFR BLD AUTO: 49.3 % (ref 42.7–76)
NRBC BLD AUTO-RTO: 0 /100 WBC (ref 0–0.2)
PLATELET # BLD AUTO: 270 10*3/MM3 (ref 140–450)
PMV BLD AUTO: 10.9 FL (ref 6–12)
POTASSIUM SERPL-SCNC: 3.8 MMOL/L (ref 3.5–5.2)
PROT SERPL-MCNC: 7.5 G/DL (ref 6–8.5)
RBC # BLD AUTO: 4.38 10*6/MM3 (ref 3.77–5.28)
SODIUM SERPL-SCNC: 142 MMOL/L (ref 136–145)
TIBC SERPL-MCNC: 471 MCG/DL (ref 298–536)
TRANSFERRIN SERPL-MCNC: 316 MG/DL (ref 200–360)
TRIGL SERPL-MCNC: 83 MG/DL (ref 0–150)
TSH SERPL DL<=0.05 MIU/L-ACNC: 1.75 UIU/ML (ref 0.27–4.2)
VIT B12 BLD-MCNC: 414 PG/ML (ref 211–946)
VLDLC SERPL-MCNC: 15 MG/DL (ref 5–40)
WBC NRBC COR # BLD AUTO: 6.27 10*3/MM3 (ref 3.4–10.8)

## 2024-10-04 PROCEDURE — 84466 ASSAY OF TRANSFERRIN: CPT | Performed by: INTERNAL MEDICINE

## 2024-10-04 PROCEDURE — 80061 LIPID PANEL: CPT | Performed by: INTERNAL MEDICINE

## 2024-10-04 PROCEDURE — 85025 COMPLETE CBC W/AUTO DIFF WBC: CPT | Performed by: INTERNAL MEDICINE

## 2024-10-04 PROCEDURE — 82306 VITAMIN D 25 HYDROXY: CPT | Performed by: INTERNAL MEDICINE

## 2024-10-04 PROCEDURE — 82607 VITAMIN B-12: CPT | Performed by: INTERNAL MEDICINE

## 2024-10-04 PROCEDURE — 36415 COLL VENOUS BLD VENIPUNCTURE: CPT | Performed by: INTERNAL MEDICINE

## 2024-10-04 PROCEDURE — 83540 ASSAY OF IRON: CPT | Performed by: INTERNAL MEDICINE

## 2024-10-04 PROCEDURE — 80053 COMPREHEN METABOLIC PANEL: CPT | Performed by: INTERNAL MEDICINE

## 2024-10-04 PROCEDURE — 82746 ASSAY OF FOLIC ACID SERUM: CPT | Performed by: INTERNAL MEDICINE

## 2024-10-04 PROCEDURE — 83036 HEMOGLOBIN GLYCOSYLATED A1C: CPT | Performed by: INTERNAL MEDICINE

## 2024-10-04 PROCEDURE — 84443 ASSAY THYROID STIM HORMONE: CPT | Performed by: INTERNAL MEDICINE

## 2024-10-04 PROCEDURE — 82728 ASSAY OF FERRITIN: CPT | Performed by: INTERNAL MEDICINE

## 2024-10-08 ENCOUNTER — OFFICE VISIT (OUTPATIENT)
Dept: INTERNAL MEDICINE | Age: 79
End: 2024-10-08
Payer: MEDICARE

## 2024-10-08 VITALS
TEMPERATURE: 98.4 F | HEIGHT: 60 IN | SYSTOLIC BLOOD PRESSURE: 122 MMHG | HEART RATE: 89 BPM | DIASTOLIC BLOOD PRESSURE: 60 MMHG | OXYGEN SATURATION: 94 % | WEIGHT: 154.2 LBS | BODY MASS INDEX: 30.27 KG/M2

## 2024-10-08 DIAGNOSIS — E78.2 MIXED HYPERLIPIDEMIA: ICD-10-CM

## 2024-10-08 DIAGNOSIS — R73.01 IMPAIRED FASTING GLUCOSE: ICD-10-CM

## 2024-10-08 DIAGNOSIS — L57.0 ACTINIC KERATOSIS: ICD-10-CM

## 2024-10-08 DIAGNOSIS — Z23 NEED FOR VACCINATION: ICD-10-CM

## 2024-10-08 DIAGNOSIS — J45.40 MODERATE PERSISTENT ASTHMA WITHOUT COMPLICATION: Primary | ICD-10-CM

## 2024-10-08 DIAGNOSIS — E55.9 VITAMIN D DEFICIENCY: ICD-10-CM

## 2024-10-08 DIAGNOSIS — F98.8 ATTENTION DEFICIT DISORDER (ADD) WITHOUT HYPERACTIVITY: ICD-10-CM

## 2024-10-08 PROCEDURE — 90662 IIV NO PRSV INCREASED AG IM: CPT | Performed by: INTERNAL MEDICINE

## 2024-10-08 PROCEDURE — 99214 OFFICE O/P EST MOD 30 MIN: CPT | Performed by: INTERNAL MEDICINE

## 2024-10-08 PROCEDURE — 1126F AMNT PAIN NOTED NONE PRSNT: CPT | Performed by: INTERNAL MEDICINE

## 2024-10-08 PROCEDURE — 1159F MED LIST DOCD IN RCRD: CPT | Performed by: INTERNAL MEDICINE

## 2024-10-08 PROCEDURE — 1160F RVW MEDS BY RX/DR IN RCRD: CPT | Performed by: INTERNAL MEDICINE

## 2024-10-08 PROCEDURE — G0008 ADMIN INFLUENZA VIRUS VAC: HCPCS | Performed by: INTERNAL MEDICINE

## 2024-10-08 RX ORDER — BENZONATATE 100 MG/1
100 CAPSULE ORAL 3 TIMES DAILY PRN
Qty: 30 CAPSULE | Refills: 1 | Status: SHIPPED | OUTPATIENT
Start: 2024-10-08

## 2024-10-08 NOTE — ASSESSMENT & PLAN NOTE
Patient is stable this regards as of her 10/24 OV.  She has not had to use any albuterol recently.  She is maintained on Symbicort twice a day and also utilizes Singulair.  She has been on 12 hours Zyrtec-D for some time as well.    She is up-to-date on the flu shot.

## 2024-10-08 NOTE — ASSESSMENT & PLAN NOTE
Patient's A1c is down from 6.3 to 5.7 as of her 10/24 OV.  Her fasting blood sugar just right around 100.  This was without any treatment, so obviously none indicated.  Will keep an eye on this with routine labs going forward.

## 2024-10-08 NOTE — ASSESSMENT & PLAN NOTE
LDL was previously in the 110 ballpark, she had 140 back in 4/24, but she is down to 134 as of 10/24.  Generally low risk this regards, if her sugars trend up in the diabetic range, we will get her on a statin.

## 2024-10-08 NOTE — PROGRESS NOTES
"Chief Complaint  Follow-up (Pt had labs, she states that this is routine. ) and skin area on back on neck (Pt has an area that she can't see real good on the back of her neck, she didn't know if she needed to see DERM for this. )    Symone Bhatia presents to [facility]    History of Present Illness:  78 yo female, former pt of Dr Posey, who has has underlying ADD, RAD, GERD, RLS, among others.  She was seen 8/22 as a New Patient, who is coming in now 10/24 for routine 6-month follow-up.  We will go over her med list, review any recent labs, and make further recommendations at that time.    Review of Systems   Constitutional:  Negative for appetite change, fatigue and fever.   HENT:  Negative for congestion and ear pain.    Eyes:  Negative for blurred vision.   Respiratory:  Negative for cough, chest tightness, shortness of breath and wheezing.    Cardiovascular:  Negative for chest pain, palpitations and leg swelling.   Gastrointestinal:  Negative for abdominal pain.   Genitourinary:  Negative for difficulty urinating, dysuria and hematuria.   Musculoskeletal:  Negative for arthralgias and gait problem.   Skin:  Negative for skin lesions.   Neurological:  Negative for syncope, memory problem and confusion.   Psychiatric/Behavioral:  Negative for self-injury and depressed mood.        Objective   Vital Signs:   /60   Pulse 89   Temp 98.4 °F (36.9 °C) (Skin)   Ht 152.4 cm (60\")   Wt 69.9 kg (154 lb 3.2 oz)   SpO2 94%   BMI 30.12 kg/m²           Physical Exam  Vitals and nursing note reviewed.   Constitutional:       General: She is not in acute distress.     Appearance: Normal appearance. She is not toxic-appearing.   HENT:      Head: Atraumatic.      Right Ear: External ear normal.      Left Ear: External ear normal.      Nose: Nose normal.      Mouth/Throat:      Mouth: Mucous membranes are moist.   Eyes:      General:         Right eye: No " discharge.         Left eye: No discharge.      Extraocular Movements: Extraocular movements intact.      Pupils: Pupils are equal, round, and reactive to light.   Cardiovascular:      Rate and Rhythm: Regular rhythm. Tachycardia present.      Pulses: Normal pulses.      Heart sounds: Normal heart sounds. No murmur heard.     No gallop.      Comments: Little tachycardic, but no ectopy.  Pulmonary:      Effort: Pulmonary effort is normal. No respiratory distress.      Breath sounds: No wheezing, rhonchi or rales.      Comments: Patient with scattered rhonchi, no labored respiration.  Abdominal:      General: There is no distension.      Palpations: Abdomen is soft. There is no mass.      Tenderness: There is no abdominal tenderness. There is no guarding.   Musculoskeletal:         General: No swelling or tenderness.      Cervical back: No tenderness.      Right lower leg: No edema.      Left lower leg: No edema.      Comments: No edema.   Skin:     General: Skin is warm and dry.      Findings: No rash.   Neurological:      General: No focal deficit present.      Mental Status: She is alert and oriented to person, place, and time. Mental status is at baseline.      Motor: No weakness.      Gait: Gait normal.   Psychiatric:         Mood and Affect: Mood normal.         Thought Content: Thought content normal.          Result Review   The following data was reviewed by: Marco Antonio Leonard MD on 08/10/2022:  [x] Laboratory  [] Microbiology  [] Radiology  [] EKG/telemetry  [] Cardiology/Vascular  [] Pathology  [x] Old records             Assessment and Plan   Diagnoses and all orders for this visit:    1. Moderate persistent asthma without complication (Primary)  Overview:  Reviewed Dr. Borges's note from 3/22:  Asthma has been doing well.    Occasionally when she is outside she has to use albuterol rescue inhaler.  Assessment: environmental allergies and asthma.  Plan: Continue Symbicort 160-4.52 puffs twice daily, albuterol  rescue inhaler as needed, Singulair 10 mg daily, Flonase 2 sniffs daily, Zyrtec-D which she has taken and tolerated for a long time.  Has seen allergist in the past.    Assessment & Plan:  Patient is stable this regards as of her 10/24 OV.  She has not had to use any albuterol recently.  She is maintained on Symbicort twice a day and also utilizes Singulair.  She has been on 12 hours Zyrtec-D for some time as well.    She is up-to-date on the flu shot.        2. Need for vaccination  -     Fluzone High-Dose 65+yrs (2104-8286)    3. Mixed hyperlipidemia  Assessment & Plan:  LDL was previously in the 110 ballpark, she had 140 back in 4/24, but she is down to 134 as of 10/24.  Generally low risk this regards, if her sugars trend up in the diabetic range, we will get her on a statin.    Orders:  -     Comprehensive Metabolic Panel; Future  -     Lipid Panel; Future    4. Impaired fasting glucose  Overview:  Patient's father was diabetic on insulin since no pills available then.    Assessment & Plan:  Patient's A1c is down from 6.3 to 5.7 as of her 10/24 OV.  Her fasting blood sugar just right around 100.  This was without any treatment, so obviously none indicated.  Will keep an eye on this with routine labs going forward.    Orders:  -     Hemoglobin A1c; Future    5. Attention deficit disorder (ADD) without hyperactivity  Assessment & Plan:  Patient continues to tolerate Vyvanse without any side effects, no palpitations no hypertension, so stable to continue current dosing as of 10/24 OV.      6. Vitamin D deficiency  Assessment & Plan:  Vitamin D is up from 21 to 27 as of her 10/24 OV.  She is not too consistent with this over-the-counter, but at least is heading the right direction.    Orders:  -     Vitamin D,25-Hydroxy; Future    7. Actinic keratosis  -     Ambulatory Referral to Dermatology    Other orders  -     benzonatate (Tessalon Perles) 100 MG capsule; Take 1 capsule by mouth 3 (Three) Times a Day As Needed  for Cough.  Dispense: 30 capsule; Refill: 1                    Follow Up   Return in about 6 months (around 4/8/2025).  Patient was given instructions and counseling regarding her condition or for health maintenance advice. Please see specific information pulled into the AVS if appropriate.

## 2024-10-08 NOTE — ASSESSMENT & PLAN NOTE
Patient continues to tolerate Vyvanse without any side effects, no palpitations no hypertension, so stable to continue current dosing as of 10/24 OV.

## 2024-10-08 NOTE — ASSESSMENT & PLAN NOTE
Vitamin D is up from 21 to 27 as of her 10/24 OV.  She is not too consistent with this over-the-counter, but at least is heading the right direction.

## 2024-10-21 DIAGNOSIS — F98.8 ATTENTION DEFICIT DISORDER (ADD) WITHOUT HYPERACTIVITY: ICD-10-CM

## 2024-10-21 RX ORDER — LISDEXAMFETAMINE DIMESYLATE 50 MG/1
50 CAPSULE ORAL EVERY MORNING
Qty: 30 CAPSULE | Refills: 0 | Status: SHIPPED | OUTPATIENT
Start: 2024-10-21 | End: 2024-11-20

## 2024-10-21 NOTE — TELEPHONE ENCOUNTER
Caller: Erik Bhatia    Relationship: Self    Best call back number: [phone]    Requested Prescriptions:   Requested Prescriptions     Pending Prescriptions Disp Refills    lisdexamfetamine (Vyvanse) 50 MG capsule 30 capsule 0     Sig: Take 1 capsule by mouth Every Morning for 30 days        Pharmacy where request should be sent: [facility] [id number]  [place] - [address]  [phone] [facility] [phone]      Last office visit with prescribing clinician: 10/8/2024   Last telemedicine visit with prescribing clinician: Visit date not found   Next office visit with prescribing clinician: 4/8/2025     Does the patient have less than a 3 day supply:  [] Yes  [x] No    Would you like a call back once the refill request has been completed: [] Yes [] No    If the office needs to give you a call back, can they leave a voicemail: [] Yes [] No    Rosanna Huber Rep   10/21/24 15:43 EDT

## 2024-11-25 DIAGNOSIS — F98.8 ATTENTION DEFICIT DISORDER (ADD) WITHOUT HYPERACTIVITY: ICD-10-CM

## 2024-11-25 RX ORDER — LISDEXAMFETAMINE DIMESYLATE 50 MG/1
50 CAPSULE ORAL EVERY MORNING
Qty: 30 CAPSULE | Refills: 0 | Status: SHIPPED | OUTPATIENT
Start: 2024-11-25 | End: 2024-12-25

## 2024-11-25 NOTE — TELEPHONE ENCOUNTER
Caller: Erik Bhatia    Relationship: Self    Best call back number: [phone]     Requested Prescriptions:   Requested Prescriptions     Pending Prescriptions Disp Refills    lisdexamfetamine (Vyvanse) 50 MG capsule 30 capsule 0     Sig: Take 1 capsule by mouth Every Morning for 30 days        Pharmacy where request should be sent: [facility] [id number]  [facility] [place] [facility] [address] AT [facility] [address] - [phone] [facility] [phone] FX     Last office visit with prescribing clinician: 10/8/2024   Last telemedicine visit with prescribing clinician: Visit date not found   Next office visit with prescribing clinician: 4/8/2025     Does the patient have less than a 3 day supply:  [x] Yes  [] No    Rosanna Marr Rep   11/25/24 16:19 EST

## 2024-12-16 ENCOUNTER — TRANSCRIBE ORDERS (OUTPATIENT)
Dept: ADMINISTRATIVE | Facility: HOSPITAL | Age: 79
End: 2024-12-16
Payer: MEDICARE

## 2024-12-16 DIAGNOSIS — Z12.31 BREAST CANCER SCREENING BY MAMMOGRAM: Primary | ICD-10-CM

## 2024-12-23 ENCOUNTER — OFFICE VISIT (OUTPATIENT)
Dept: PODIATRY | Facility: CLINIC | Age: 79
End: 2024-12-23
Payer: MEDICARE

## 2024-12-23 VITALS
OXYGEN SATURATION: 94 % | BODY MASS INDEX: 29.69 KG/M2 | DIASTOLIC BLOOD PRESSURE: 79 MMHG | HEART RATE: 85 BPM | SYSTOLIC BLOOD PRESSURE: 132 MMHG | TEMPERATURE: 98 F | WEIGHT: 152 LBS

## 2024-12-23 DIAGNOSIS — M20.42 HAMMER TOES OF BOTH FEET: ICD-10-CM

## 2024-12-23 DIAGNOSIS — M79.672 FOOT PAIN, BILATERAL: ICD-10-CM

## 2024-12-23 DIAGNOSIS — M20.12 VALGUS DEFORMITY OF BOTH GREAT TOES: Primary | ICD-10-CM

## 2024-12-23 DIAGNOSIS — M20.11 VALGUS DEFORMITY OF BOTH GREAT TOES: Primary | ICD-10-CM

## 2024-12-23 DIAGNOSIS — L89.891 PRESSURE INJURY OF RIGHT FOOT, STAGE 1: ICD-10-CM

## 2024-12-23 DIAGNOSIS — M79.671 FOOT PAIN, BILATERAL: ICD-10-CM

## 2024-12-23 DIAGNOSIS — B35.1 ONYCHOMYCOSIS: ICD-10-CM

## 2024-12-23 DIAGNOSIS — M20.41 HAMMER TOES OF BOTH FEET: ICD-10-CM

## 2024-12-23 DIAGNOSIS — M79.671 FOOT PAIN, RIGHT: ICD-10-CM

## 2024-12-23 DIAGNOSIS — L89.891 PRESSURE INJURY OF TOE OF RIGHT FOOT, STAGE 1: ICD-10-CM

## 2024-12-23 DIAGNOSIS — L60.0 ONYCHOCRYPTOSIS: ICD-10-CM

## 2024-12-23 RX ORDER — TRIAMCINOLONE ACETONIDE 1 MG/G
1 CREAM TOPICAL
COMMUNITY
Start: 2024-12-16

## 2024-12-23 NOTE — PROGRESS NOTES
[facility] - PODIATRY    Today's Date: 12/23/24    Patient Name: Erik Bhatia  MRN: [id number]  CSN: [id number]  PCP: Marco Antonio Leonard MD, last PCP visit: 10/8//2024  Referring Provider: No ref. provider found    SUBJECTIVE     Chief Complaint   Patient presents with    Left Foot - Follow-up, Nail Problem    Right Foot - Follow-up, Nail Problem, Foot Ulcer     HPI: Eirk Bhaita, a 79 y.o.female, comes presents to clinic with chief complaint of:    New, Established, New Problem: Established    Location:  hyperkeratotic lesion(s) on dorsal right second proximal interphalangeal joint    Duration: 2015    Onset:  gradual    Nature:  sore    Stable, worsening, improving: recurring    Aggravating factors:  Patient reports lesion(s) is painful with shoegear and ambulation.      Previous Treatment:   Debridement  ---------------------------  New, Established, New Problem: est    Location: Toenails    Duration:  Greater than five years    Onset: Gradual    Nature: sore with palpation.    Stable, worsening, improving: recurring    Aggravating factors: Pain with shoe gear and ambulation.    Previous Treatment:  Debridement    Medical changes:  none    Patient denies any fevers, chills, nausea, vomiting, shortness of breathe, nor any other constitutional signs nor symptoms.    I have reviewed/confirmed previously documented HPI with no changes.     Past Medical History:   Diagnosis Date    ADHD (attention deficit hyperactivity disorder) about 2000    Allergic ?    Anemia ?    Ankle sprain Long time ago    Arthritis     Arthritis of back ?    Asthma     Bladder disorder     Bunion     Callus ?    Chronic allergic rhinitis     Corns and callus     CTS (carpal tunnel syndrome) 2002    Had surgeries in 2003 and 204    GERD (gastroesophageal reflux disease)     Hammertoe     Ingrown toenail     Limb pain     Limb swelling     Mixed hyperlipidemia 08/10/2022    Numbness in feet     Periarthritis of shoulder 2024     SOB (shortness of breath)      Past Surgical History:   Procedure Laterality Date    BREAST BIOPSY      CARPAL TUNNEL RELEASE      COLONOSCOPY   and before    ENDOSCOPY      HAND SURGERY      Carpal tunnel listed anove    TRIGGER FINGER RELEASE      TRIGGER POINT INJECTION      Bilateral thumb releases in      Family History   Problem Relation Age of Onset    Stomach cancer Father     Diabetes Father             Asthma Father     Cancer Father         stomach    Heart disease Mother     Diabetes Maternal Grandfather          long ago    Breast cancer Neg Hx     Ovarian cancer Neg Hx     Uterine cancer Neg Hx     Colon cancer Neg Hx      Social History     Socioeconomic History    Marital status:    Tobacco Use    Smoking status: Never     Passive exposure: Never    Smokeless tobacco: Never    Tobacco comments:     none   Vaping Use    Vaping status: Never Used   Substance and Sexual Activity    Alcohol use: Yes     Alcohol/week: 3.0 - 4.0 standard drinks of alcohol     Types: 3 - 4 Glasses of wine per week     Comment: Not every week    Drug use: Never    Sexual activity: Not Currently     Partners: Male     Birth control/protection: I.U.D., Birth control pill, Pill     Comment: All birth control and sexual partner info from  past     Allergies   Allergen Reactions    Seasonal Ic [Kelp-B6-Lecithin-Vinegar] Cough    Nitrofurantoin Provider Review Needed     Current Outpatient Medications   Medication Sig Dispense Refill    albuterol sulfate  (90 Base) MCG/ACT inhaler Inhale 2 puffs Every 4 (Four) Hours As Needed for Wheezing. 18 g 1    aluminum hydroxide-magnesium carbonate (Gaviscon)  MG/15ML suspension oral suspension 1 mL.      benzonatate (Tessalon Perles) 100 MG capsule Take 1 capsule by mouth 3 (Three) Times a Day As Needed for Cough. 30 capsule 1    cetirizine-pseudoephedrine (ZyrTEC-D) 5-120 MG per 12 hr tablet Take 2 tablets by mouth Daily. 180 tablet 1     Cholecalciferol 125 MCG (5000 UT) tablet 1 tablet Daily.      Diclofenac Sodium (VOLTAREN) 1 % gel gel Apply 4 g topically to the appropriate area as directed 4 (Four) Times a Day As Needed (right shoulder pain). 150 g 1    fluticasone (Flonase) 50 MCG/ACT nasal spray 2 sprays into the nostril(s) as directed by provider Daily. 45 mL 3    lisdexamfetamine (Vyvanse) 50 MG capsule Take 1 capsule by mouth Every Morning for 30 days 30 capsule 0    montelukast (SINGULAIR) 10 MG tablet Take 1 tablet by mouth Daily. 90 tablet 3    olopatadine (PATADAY) 0.2 % solution ophthalmic solution 1 drop.      omeprazole (priLOSEC) 20 MG capsule Take 1 capsule by mouth Daily. 90 capsule 3    potassium chloride (MICRO-K) 10 MEQ CR capsule Take 2 capsules by mouth Daily. 180 capsule 3    silver sulfadiazine (SILVADENE, SSD) 1 % cream APPLY TOPICALLY TWICE A DAY TO THE APPROPRIATE AREA AS DIRECTED 25 g 5    triamcinolone (KENALOG) 0.1 % cream 1 Application.      triamcinolone (KENALOG) 0.1 % ointment APPLY TO AFFECTED AREA(S) THREE TIMES A DAY AS DIRECTED 30 g 1    budesonide-formoterol (Symbicort) 160-4.5 MCG/ACT inhaler Inhale 2 puffs 2 (Two) Times a Day for 90 days. 30.6 g 3     No current facility-administered medications for this visit.     Review of Systems   Constitutional: Negative.    Skin:         Painful hyperkeratotic lesion and toenails   All other systems reviewed and are negative.      OBJECTIVE     Vitals:    12/23/24 1110   BP: 132/79   Pulse: 85   Temp: 98 °F (36.7 °C)   SpO2: 94%         Body mass index is 29.69 kg/m².    Lab Results   Component Value Date    GLUCOSE 102 (H) 10/04/2024    CALCIUM 9.8 10/04/2024     10/04/2024    K 3.8 10/04/2024    CO2 25.1 10/04/2024     10/04/2024    BUN 18 10/04/2024    CREATININE 0.72 10/04/2024    EGFRIFNONA 79 09/10/2021    BCR 25.0 10/04/2024    ANIONGAP 13.9 10/04/2024       Patient seen in no apparent distress.      PHYSICAL EXAM:     Foot/Ankle  Exam    GENERAL  Appearance:  appears stated age and elderly  Orientation:  AAOx3  Affect:  appropriate  Gait:  unimpaired  Assistance:  independent  Right shoe gear: casual shoe  Left shoe gear: casual shoe    VASCULAR     Right Foot Vascularity   Normal vascular exam    Dorsalis pedis:  2+  Posterior tibial:  2+  Skin temperature:  warm  Edema grading:  None  CFT:  < 3 seconds  Pedal hair growth:  Present  Varicosities:  none     Left Foot Vascularity   Normal vascular exam    Dorsalis pedis:  2+  Posterior tibial:  2+  Skin temperature:  warm  Edema grading:  None  CFT:  < 3 seconds  Pedal hair growth:  Present  Varicosities:  none     NEUROLOGIC     Right Foot Neurologic   Normal sensation    Light touch sensation: normal  Vibratory sensation: normal  Hot/Cold sensation: normal  Protective Sensation using Chesterton-Ayush Monofilament:   Sites intact: 10  Sites tested: 10     Left Foot Neurologic   Normal sensation    Light touch sensation: normal  Vibratory sensation: normal  Hot/Cold sensation:  normal  Protective Sensation using Chesterton-Ayush Monofilament:   Sites intact: 10  Sites tested: 10    MUSCULOSKELETAL     Right Foot Musculoskeletal   Hammertoe:  Second toe, third toe, fourth toe and fifth toe  Hallux valgus: Yes       Left Foot Musculoskeletal   Hammertoe:  Second toe, third toe, fourth toe and fifth toe  Hallux valgus: Yes      MUSCLE STRENGTH     Right Foot Muscle Strength   Foot dorsiflexion:  4  Foot plantar flexion:  4  Foot inversion:  4  Foot eversion:  4     Left Foot Muscle Strength   Foot dorsiflexion:  4  Foot plantar flexion:  4  Foot inversion:  4  Foot eversion:  4    RANGE OF MOTION     Right Foot Range of Motion   Foot and ankle ROM within normal limits       Left Foot Range of Motion   Foot and ankle ROM within normal limits      DERMATOLOGIC      Right Foot Dermatologic   Skin  Positive for ulcer.   Nails  1.  Positive for elongated, onychomycosis, abnormal thickness, subungual  debris and ingrown toenail.  2.  Positive for elongated, onychomycosis, abnormal thickness, subungual debris and ingrown toenail.  3.  Positive for elongated, onychomycosis, abnormal thickness, subungual debris and ingrown toenail.  4.  Positive for elongated, onychomycosis, abnormal thickness, subungual debris and ingrown toenail.  5.  Positive for elongated, onychomycosis, abnormal thickness, subungual debris and ingrown toenail.     Left Foot Dermatologic   Skin  Left foot skin is intact.   Nails  1.  Positive for elongated, onychomycosis, abnormal thickness, subungual debris and ingrown toenail.  2.  Positive for elongated, onychomycosis, abnormal thickness, subungual debris and ingrown toenail.  3.  Positive for elongated, onychomycosis, abnormal thickness, subungual debris and ingrown toenail.  4.  Positive for elongated, onychomycosis, abnormally thick, subungual debris and ingrown toenail.  5.  Positive for elongated, onychomycosis, abnormally thick, subungual debris and ingrown toenail.     Right foot additional comments: Stage 1 ulceration on the dorsal right second PIP joint area of the foot.  Hyperkeratotic tissue with subepidermal hemosiderin deposition is present.  No surrounding, edema, erythema, lymphangitis, fluctuance, nor signs of infection.  No drainage present.  10 mm x 9 mm x 3 mm.  This area was debrided via excisional partial thickness debridement with a 15 scalpel blade.  Post debridement measurements were 7 mm x 7 mm x 1 mm in depth.      I have reexamined the patient the results are consistent with the previously documented exam.    ASSESSMENT/PLAN     Diagnoses and all orders for this visit:    1. 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 Pressure injury of toe of right foot, stage 1    3. Pressure injury of right foot, stage 1    4. Foot pain, right    5. Onychomycosis    6. Hammer toes of both feet    7. Foot pain, bilateral    8. Onychocryptosis    Comprehensive lower  extremity examination and evaluation was performed.    Discussed findings and treatment plan including risks, benefits, and treatment options with patient in detail. Patient agreed with treatment plan.    Toenails 1 through 5 bilaterally were debrided in thickness and length with toenail tremors.  These were then smoothed with a dermal sanding bur.  Patient tolerated the procedure well.    An After Visit Summary was printed and given to the patient at discharge, including (if requested) any available informative/educational handouts regarding diagnosis, treatment, or medications. All questions were answered to patient/family satisfaction. Should symptoms fail to improve or worsen they agree to call or return to clinic or to go to the Emergency Department. Discussed the importance of following up with any needed screening tests/labs/specialist appointments and any requested follow-up recommended by me today. Importance of maintaining follow-up discussed and patient accepts that missed appointments can delay diagnosis and potentially lead to worsening of conditions.    Return in about 9 weeks (around 2/24/2025) for Ulcer Follow-Up, Toenail Care., or sooner if acute issues arise.    I have reviewed the assessment and plan and verified the accuracy of it. No changes to assessment and plan since the information was documented. Rian Jo DPM 12/23/24     I have dictated this note utilizing Dragon Dictation.  Please note that portions of this note were completed with a voice recognition program.  Part of this note may be an electronic transcription/translation of spoken language to printed text using the Dragon Dictation System.      This document has been electronically signed by Rian Jo DPM on December 23, 2024 11:28 EST

## 2024-12-30 DIAGNOSIS — F98.8 ATTENTION DEFICIT DISORDER (ADD) WITHOUT HYPERACTIVITY: ICD-10-CM

## 2024-12-30 RX ORDER — LISDEXAMFETAMINE DIMESYLATE 50 MG/1
50 CAPSULE ORAL EVERY MORNING
Qty: 30 CAPSULE | Refills: 0 | Status: SHIPPED | OUTPATIENT
Start: 2024-12-30 | End: 2025-01-29

## 2024-12-30 NOTE — TELEPHONE ENCOUNTER
Caller: Erik Bhatia    Relationship: Self    Best call back number: [phone]     Requested Prescriptions:   Requested Prescriptions     Pending Prescriptions Disp Refills    lisdexamfetamine (Vyvanse) 50 MG capsule 30 capsule 0     Sig: Take 1 capsule by mouth Every Morning for 30 days        Pharmacy where request should be sent: [facility] [id number]  [place] - [address] AT [facility] [address] - [phone] [facility] [phone] FX     Last office visit with prescribing clinician: 10/8/2024   Last telemedicine visit with prescribing clinician: Visit date not found   Next office visit with prescribing clinician: 4/8/2025     Additional details provided by patient: ONE LEFT ON HAND.     Does the patient have less than a 3 day supply:  [x] Yes  [] No      Rosanna Delaney Rep   12/30/24 12:56 EST

## 2025-01-24 DIAGNOSIS — F98.8 ATTENTION DEFICIT DISORDER (ADD) WITHOUT HYPERACTIVITY: ICD-10-CM

## 2025-01-28 ENCOUNTER — OFFICE VISIT (OUTPATIENT)
Dept: PODIATRY | Facility: CLINIC | Age: 80
End: 2025-01-28
Payer: MEDICARE

## 2025-01-28 VITALS
WEIGHT: 153 LBS | BODY MASS INDEX: 30.04 KG/M2 | HEIGHT: 60 IN | HEART RATE: 102 BPM | OXYGEN SATURATION: 94 % | SYSTOLIC BLOOD PRESSURE: 147 MMHG | TEMPERATURE: 97.1 F | DIASTOLIC BLOOD PRESSURE: 83 MMHG

## 2025-01-28 DIAGNOSIS — L89.891 PRESSURE INJURY OF RIGHT FOOT, STAGE 1: ICD-10-CM

## 2025-01-28 DIAGNOSIS — M79.671 FOOT PAIN, RIGHT: ICD-10-CM

## 2025-01-28 DIAGNOSIS — M20.41 HAMMER TOES OF BOTH FEET: ICD-10-CM

## 2025-01-28 DIAGNOSIS — M20.42 HAMMER TOES OF BOTH FEET: ICD-10-CM

## 2025-01-28 DIAGNOSIS — M20.12 VALGUS DEFORMITY OF BOTH GREAT TOES: Primary | ICD-10-CM

## 2025-01-28 DIAGNOSIS — M20.11 VALGUS DEFORMITY OF BOTH GREAT TOES: Primary | ICD-10-CM

## 2025-01-28 DIAGNOSIS — L89.891 PRESSURE INJURY OF TOE OF RIGHT FOOT, STAGE 1: ICD-10-CM

## 2025-01-28 NOTE — PROGRESS NOTES
[facility] - PODIATRY    Today's Date: 25    Patient Name: Erik Bhatia  MRN: [id number]  CSN: [id number]  PCP: Marco Antonio Leonard MD, last PCP visit: 10/8//2024  Referring Provider: No ref. provider found    SUBJECTIVE     Chief Complaint   Patient presents with    Right Foot - Follow-up, Foot Ulcer     Would like to discuss amputation surgery of second toe as dicussed previously     HPI: Erik Bhatia, a 79 y.o.female, comes presents to clinic with chief complaint of:    New, Established, New Problem: Established    Location:  hyperkeratotic lesion(s) on dorsal right second proximal interphalangeal joint    Duration:     Onset:  gradual    Nature:  sore    Stable, worsening, improving: recurring    Aggravating factors:  Patient reports lesion(s) is painful with shoegear and ambulation.      Previous Treatment:   Debridement    I have reviewed/confirmed previously documented HPI with no changes.     Past Medical History:   Diagnosis Date    ADHD (attention deficit hyperactivity disorder) about     Allergic ?    Anemia ?    Ankle sprain Long time ago    Arthritis     Arthritis of back ?    Asthma     Bladder disorder     Bunion     Callus ?    Chronic allergic rhinitis     Corns and callus     CTS (carpal tunnel syndrome)     Had surgeries in  and     GERD (gastroesophageal reflux disease)     Hammertoe     Ingrown toenail     Limb pain     Limb swelling     Mixed hyperlipidemia 08/10/2022    Numbness in feet     Periarthritis of shoulder     SOB (shortness of breath)      Past Surgical History:   Procedure Laterality Date    BREAST BIOPSY      CARPAL TUNNEL RELEASE      COLONOSCOPY   and before    ENDOSCOPY      HAND SURGERY      Carpal tunnel listed anove    TRIGGER FINGER RELEASE      TRIGGER POINT INJECTION  2001    Bilateral thumb releases in      Family History   Problem Relation Age of Onset    Stomach cancer Father     Diabetes Father              Asthma Father     Cancer Father         stomach    Heart disease Mother     Diabetes Maternal Grandfather          long ago    Breast cancer Neg Hx     Ovarian cancer Neg Hx     Uterine cancer Neg Hx     Colon cancer Neg Hx      Social History     Socioeconomic History    Marital status:    Tobacco Use    Smoking status: Never     Passive exposure: Never    Smokeless tobacco: Never    Tobacco comments:     none   Vaping Use    Vaping status: Never Used   Substance and Sexual Activity    Alcohol use: Yes     Alcohol/week: 3.0 - 4.0 standard drinks of alcohol     Types: 3 - 4 Glasses of wine per week     Comment: Not every week    Drug use: Never    Sexual activity: Not Currently     Partners: Male     Birth control/protection: I.U.D., Birth control pill, Pill     Comment: All birth control and sexual partner info from  past     Allergies   Allergen Reactions    Seasonal Ic [Kelp-B6-Lecithin-Vinegar] Cough    Nitrofurantoin Provider Review Needed     Current Outpatient Medications   Medication Sig Dispense Refill    albuterol sulfate  (90 Base) MCG/ACT inhaler Inhale 2 puffs Every 4 (Four) Hours As Needed for Wheezing. 18 g 1    aluminum hydroxide-magnesium carbonate (Gaviscon)  MG/15ML suspension oral suspension 1 mL.      benzonatate (Tessalon Perles) 100 MG capsule Take 1 capsule by mouth 3 (Three) Times a Day As Needed for Cough. 30 capsule 1    budesonide-formoterol (Symbicort) 160-4.5 MCG/ACT inhaler Inhale 2 puffs 2 (Two) Times a Day for 90 days. 30.6 g 3    cetirizine-pseudoephedrine (ZyrTEC-D) 5-120 MG per 12 hr tablet Take 2 tablets by mouth Daily. 180 tablet 1    Cholecalciferol 125 MCG (5000 UT) tablet 1 tablet Daily.      Diclofenac Sodium (VOLTAREN) 1 % gel gel Apply 4 g topically to the appropriate area as directed 4 (Four) Times a Day As Needed (right shoulder pain). 150 g 1    fluticasone (Flonase) 50 MCG/ACT nasal spray 2 sprays into the nostril(s) as directed by provider  Daily. 45 mL 3    lisdexamfetamine (Vyvanse) 50 MG capsule Take 1 capsule by mouth Every Morning for 30 days 30 capsule 0    montelukast (SINGULAIR) 10 MG tablet Take 1 tablet by mouth Daily. 90 tablet 3    olopatadine (PATADAY) 0.2 % solution ophthalmic solution 1 drop.      omeprazole (priLOSEC) 20 MG capsule Take 1 capsule by mouth Daily. 90 capsule 3    potassium chloride (MICRO-K) 10 MEQ CR capsule Take 2 capsules by mouth Daily. 180 capsule 3    silver sulfadiazine (SILVADENE, SSD) 1 % cream APPLY TOPICALLY TWICE A DAY TO THE APPROPRIATE AREA AS DIRECTED 25 g 5    triamcinolone (KENALOG) 0.1 % cream 1 Application.      triamcinolone (KENALOG) 0.1 % ointment APPLY TO AFFECTED AREA(S) THREE TIMES A DAY AS DIRECTED 30 g 1     No current facility-administered medications for this visit.     Review of Systems   Constitutional: Negative.    Skin:         Painful hyperkeratotic lesion and toenails   All other systems reviewed and are negative.      OBJECTIVE     Vitals:    01/28/25 1325   BP: 147/83   Pulse: 102   Temp: 97.1 °F (36.2 °C)   SpO2: 94%         Body mass index is 29.88 kg/m².    Lab Results   Component Value Date    GLUCOSE 102 (H) 10/04/2024    CALCIUM 9.8 10/04/2024     10/04/2024    K 3.8 10/04/2024    CO2 25.1 10/04/2024     10/04/2024    BUN 18 10/04/2024    CREATININE 0.72 10/04/2024    EGFRIFNONA 79 09/10/2021    BCR 25.0 10/04/2024    ANIONGAP 13.9 10/04/2024       Patient seen in no apparent distress.      PHYSICAL EXAM:     Foot/Ankle Exam    GENERAL  Appearance:  appears stated age and elderly  Orientation:  AAOx3  Affect:  appropriate  Gait:  unimpaired  Assistance:  independent  Right shoe gear: sandal  Left shoe gear: sandal    VASCULAR     Right Foot Vascularity   Normal vascular exam    Dorsalis pedis:  2+  Posterior tibial:  2+  Skin temperature:  warm  Edema grading:  None  CFT:  < 3 seconds  Pedal hair growth:  Present  Varicosities:  none     Left Foot Vascularity   Normal  vascular exam    Dorsalis pedis:  2+  Posterior tibial:  2+  Skin temperature:  warm  Edema grading:  None  CFT:  < 3 seconds  Pedal hair growth:  Present  Varicosities:  none     NEUROLOGIC     Right Foot Neurologic   Normal sensation    Light touch sensation: normal  Vibratory sensation: normal  Hot/Cold sensation: normal  Protective Sensation using [place]-Ayush Monofilament:   Sites intact: 10  Sites tested: 10     Left Foot Neurologic   Normal sensation    Light touch sensation: normal  Vibratory sensation: normal  Hot/Cold sensation:  normal  Protective Sensation using [place]-Ayush Monofilament:   Sites intact: 10  Sites tested: 10    MUSCULOSKELETAL     Right Foot Musculoskeletal   Hammertoe:  Second toe, third toe, fourth toe and fifth toe  Hallux valgus: Yes       Left Foot Musculoskeletal   Hammertoe:  Second toe, third toe, fourth toe and fifth toe  Hallux valgus: Yes      MUSCLE STRENGTH     Right Foot Muscle Strength   Foot dorsiflexion:  4  Foot plantar flexion:  4  Foot inversion:  4  Foot eversion:  4     Left Foot Muscle Strength   Foot dorsiflexion:  4  Foot plantar flexion:  4  Foot inversion:  4  Foot eversion:  4    RANGE OF MOTION     Right Foot Range of Motion   Foot and ankle ROM within normal limits       Left Foot Range of Motion   Foot and ankle ROM within normal limits      DERMATOLOGIC      Right Foot Dermatologic   Skin  Positive for ulcer.      Left Foot Dermatologic   Skin  Left foot skin is intact.      Right foot additional comments: Stage 1 ulceration on the dorsal right second PIP joint area of the foot.  Hyperkeratotic tissue with subepidermal hemosiderin deposition is present.  No surrounding, edema, erythema, lymphangitis, fluctuance, nor signs of infection.  No drainage present.  12 mm x 10 mm x 3 mm.  This area was debrided via excisional partial thickness debridement with a 15 scalpel blade.  Post debridement measurements were 8 mm x 8 mm x 1 mm in depth.      I have  reexamined the patient the results are consistent with the previously documented exam.    XR Foot 3+ View Right    Result Date: 1/28/2025  Narrative: IN-OFFICE IMAGING:  Standing, weightbearing, 3 view, AP, MO, Lateral, Right foot Indication:  Foot Pain Findings: Significant medial deviation of the first metatarsal with associated lateral deviation of the hallux at the metatarsal phalangeal joint.  Overlapping second hammertoe.  Osteopenia changes seen throughout consistent with the patient's age.  Anterior cyma line seen on lateral view.  No periosteal reactions nor osteolytic changes seen.  No occult fractures seen. Comparison: No comparison views available.     ASSESSMENT/PLAN     Diagnoses and all orders for this visit:    1. 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 Pressure injury of toe of right foot, stage 1    3. Pressure injury of right foot, stage 1    4. Foot pain, right    5. Hammer toes of both feet  -     Case Request; Standing  -     ceFAZolin (ANCEF) 2 g in sodium chloride 0.9 % 100 mL IVPB  -     Case Request    Other orders  -     Follow Anesthesia Guidelines / Protocol; Standing  -     Verify NPO Status; Standing    Comprehensive lower extremity examination and evaluation was performed.    Discussed findings and treatment plan including risks, benefits, and treatment options with patient in detail. Patient agreed with treatment plan.    Planned surgery:  Right 2nd toe amputation.    The risks and benefits of the surgery were discussed with the patient.  This discussion included possible complications of requiring further surgery, possible delayed wound healing, further surgery requiring amputation of the foot or leg, and also included the complication of death.  All the patient's questions were answered to their satisfaction.  Patient states they would like to proceed with the procedure.    Discussed with the patient at length surgical versus nonsurgical options.  Explained to the  patient in detail that surgical intervention is only indicated when the level of pain is such that it negatively affects her daily life.    It was explained to the patient that surgical interventions often times do not relieve all of the pain associated with the deformity    Risks and complications associated with surgical versus nonsurgical options were explained.  The patient understands that the problem will most likely continue to evolve and surgical intervention is the only treatment plan that actually corrects the deformities.    Upon discussion of non-surgical conservative option, surgical correction, post-operative requirements along with risk and benefits of the surgery along with expected outcomes, the patient states they would like to proceed with the scheduling surgery.      The patient has decided to move forward with surgical intervention understanding all of these risks and complications.    An After Visit Summary was printed and given to the patient at discharge, including (if requested) any available informative/educational handouts regarding diagnosis, treatment, or medications. All questions were answered to patient/family satisfaction. Should symptoms fail to improve or worsen they agree to call or return to clinic or to go to the Emergency Department. Discussed the importance of following up with any needed screening tests/labs/specialist appointments and any requested follow-up recommended by me today. Importance of maintaining follow-up discussed and patient accepts that missed appointments can delay diagnosis and potentially lead to worsening of conditions.    Return in about 7 weeks (around 3/18/2025) for Post Operative, X-ray needed., or sooner if acute issues arise.    I have reviewed the assessment and plan and verified the accuracy of it. No changes to assessment and plan since the information was documented. Rian Jo, GUILLERMINA 01/28/25     I have dictated this note utilizing Dragon  Dictation.  Please note that portions of this note were completed with a voice recognition program.  Part of this note may be an electronic transcription/translation of spoken language to printed text using the Dragon Dictation System.      This document has been electronically signed by Rian Jo DPM on January 28, 2025 13:55 EST

## 2025-01-28 NOTE — H&P (VIEW-ONLY)
[facility] - PODIATRY    Today's Date: 25    Patient Name: Erik Bhatia  MRN: [id number]  CSN: [id number]  PCP: Marco Antonio Leonard MD, last PCP visit: 10/8//2024  Referring Provider: No ref. provider found    SUBJECTIVE     Chief Complaint   Patient presents with    Right Foot - Follow-up, Foot Ulcer     Would like to discuss amputation surgery of second toe as dicussed previously     HPI: Erik Bhatia, a 79 y.o.female, comes presents to clinic with chief complaint of:    New, Established, New Problem: Established    Location:  hyperkeratotic lesion(s) on dorsal right second proximal interphalangeal joint    Duration:     Onset:  gradual    Nature:  sore    Stable, worsening, improving: recurring    Aggravating factors:  Patient reports lesion(s) is painful with shoegear and ambulation.      Previous Treatment:   Debridement    I have reviewed/confirmed previously documented HPI with no changes.     Past Medical History:   Diagnosis Date    ADHD (attention deficit hyperactivity disorder) about     Allergic ?    Anemia ?    Ankle sprain Long time ago    Arthritis     Arthritis of back ?    Asthma     Bladder disorder     Bunion     Callus ?    Chronic allergic rhinitis     Corns and callus     CTS (carpal tunnel syndrome)     Had surgeries in  and     GERD (gastroesophageal reflux disease)     Hammertoe     Ingrown toenail     Limb pain     Limb swelling     Mixed hyperlipidemia 08/10/2022    Numbness in feet     Periarthritis of shoulder     SOB (shortness of breath)      Past Surgical History:   Procedure Laterality Date    BREAST BIOPSY      CARPAL TUNNEL RELEASE      COLONOSCOPY   and before    ENDOSCOPY      HAND SURGERY      Carpal tunnel listed anove    TRIGGER FINGER RELEASE      TRIGGER POINT INJECTION  2001    Bilateral thumb releases in      Family History   Problem Relation Age of Onset    Stomach cancer Father     Diabetes Father              Asthma Father     Cancer Father         stomach    Heart disease Mother     Diabetes Maternal Grandfather          long ago    Breast cancer Neg Hx     Ovarian cancer Neg Hx     Uterine cancer Neg Hx     Colon cancer Neg Hx      Social History     Socioeconomic History    Marital status:    Tobacco Use    Smoking status: Never     Passive exposure: Never    Smokeless tobacco: Never    Tobacco comments:     none   Vaping Use    Vaping status: Never Used   Substance and Sexual Activity    Alcohol use: Yes     Alcohol/week: 3.0 - 4.0 standard drinks of alcohol     Types: 3 - 4 Glasses of wine per week     Comment: Not every week    Drug use: Never    Sexual activity: Not Currently     Partners: Male     Birth control/protection: I.U.D., Birth control pill, Pill     Comment: All birth control and sexual partner info from  past     Allergies   Allergen Reactions    Seasonal Ic [Kelp-B6-Lecithin-Vinegar] Cough    Nitrofurantoin Provider Review Needed     Current Outpatient Medications   Medication Sig Dispense Refill    albuterol sulfate  (90 Base) MCG/ACT inhaler Inhale 2 puffs Every 4 (Four) Hours As Needed for Wheezing. 18 g 1    aluminum hydroxide-magnesium carbonate (Gaviscon)  MG/15ML suspension oral suspension 1 mL.      benzonatate (Tessalon Perles) 100 MG capsule Take 1 capsule by mouth 3 (Three) Times a Day As Needed for Cough. 30 capsule 1    budesonide-formoterol (Symbicort) 160-4.5 MCG/ACT inhaler Inhale 2 puffs 2 (Two) Times a Day for 90 days. 30.6 g 3    cetirizine-pseudoephedrine (ZyrTEC-D) 5-120 MG per 12 hr tablet Take 2 tablets by mouth Daily. 180 tablet 1    Cholecalciferol 125 MCG (5000 UT) tablet 1 tablet Daily.      Diclofenac Sodium (VOLTAREN) 1 % gel gel Apply 4 g topically to the appropriate area as directed 4 (Four) Times a Day As Needed (right shoulder pain). 150 g 1    fluticasone (Flonase) 50 MCG/ACT nasal spray 2 sprays into the nostril(s) as directed by provider  Daily. 45 mL 3    lisdexamfetamine (Vyvanse) 50 MG capsule Take 1 capsule by mouth Every Morning for 30 days 30 capsule 0    montelukast (SINGULAIR) 10 MG tablet Take 1 tablet by mouth Daily. 90 tablet 3    olopatadine (PATADAY) 0.2 % solution ophthalmic solution 1 drop.      omeprazole (priLOSEC) 20 MG capsule Take 1 capsule by mouth Daily. 90 capsule 3    potassium chloride (MICRO-K) 10 MEQ CR capsule Take 2 capsules by mouth Daily. 180 capsule 3    silver sulfadiazine (SILVADENE, SSD) 1 % cream APPLY TOPICALLY TWICE A DAY TO THE APPROPRIATE AREA AS DIRECTED 25 g 5    triamcinolone (KENALOG) 0.1 % cream 1 Application.      triamcinolone (KENALOG) 0.1 % ointment APPLY TO AFFECTED AREA(S) THREE TIMES A DAY AS DIRECTED 30 g 1     No current facility-administered medications for this visit.     Review of Systems   Constitutional: Negative.    Skin:         Painful hyperkeratotic lesion and toenails   All other systems reviewed and are negative.      OBJECTIVE     Vitals:    01/28/25 1325   BP: 147/83   Pulse: 102   Temp: 97.1 °F (36.2 °C)   SpO2: 94%         Body mass index is 29.88 kg/m².    Lab Results   Component Value Date    GLUCOSE 102 (H) 10/04/2024    CALCIUM 9.8 10/04/2024     10/04/2024    K 3.8 10/04/2024    CO2 25.1 10/04/2024     10/04/2024    BUN 18 10/04/2024    CREATININE 0.72 10/04/2024    EGFRIFNONA 79 09/10/2021    BCR 25.0 10/04/2024    ANIONGAP 13.9 10/04/2024       Patient seen in no apparent distress.      PHYSICAL EXAM:     Foot/Ankle Exam    GENERAL  Appearance:  appears stated age and elderly  Orientation:  AAOx3  Affect:  appropriate  Gait:  unimpaired  Assistance:  independent  Right shoe gear: sandal  Left shoe gear: sandal    VASCULAR     Right Foot Vascularity   Normal vascular exam    Dorsalis pedis:  2+  Posterior tibial:  2+  Skin temperature:  warm  Edema grading:  None  CFT:  < 3 seconds  Pedal hair growth:  Present  Varicosities:  none     Left Foot Vascularity   Normal  vascular exam    Dorsalis pedis:  2+  Posterior tibial:  2+  Skin temperature:  warm  Edema grading:  None  CFT:  < 3 seconds  Pedal hair growth:  Present  Varicosities:  none     NEUROLOGIC     Right Foot Neurologic   Normal sensation    Light touch sensation: normal  Vibratory sensation: normal  Hot/Cold sensation: normal  Protective Sensation using [place]-Ayush Monofilament:   Sites intact: 10  Sites tested: 10     Left Foot Neurologic   Normal sensation    Light touch sensation: normal  Vibratory sensation: normal  Hot/Cold sensation:  normal  Protective Sensation using [place]-Ayush Monofilament:   Sites intact: 10  Sites tested: 10    MUSCULOSKELETAL     Right Foot Musculoskeletal   Hammertoe:  Second toe, third toe, fourth toe and fifth toe  Hallux valgus: Yes       Left Foot Musculoskeletal   Hammertoe:  Second toe, third toe, fourth toe and fifth toe  Hallux valgus: Yes      MUSCLE STRENGTH     Right Foot Muscle Strength   Foot dorsiflexion:  4  Foot plantar flexion:  4  Foot inversion:  4  Foot eversion:  4     Left Foot Muscle Strength   Foot dorsiflexion:  4  Foot plantar flexion:  4  Foot inversion:  4  Foot eversion:  4    RANGE OF MOTION     Right Foot Range of Motion   Foot and ankle ROM within normal limits       Left Foot Range of Motion   Foot and ankle ROM within normal limits      DERMATOLOGIC      Right Foot Dermatologic   Skin  Positive for ulcer.      Left Foot Dermatologic   Skin  Left foot skin is intact.      Right foot additional comments: Stage 1 ulceration on the dorsal right second PIP joint area of the foot.  Hyperkeratotic tissue with subepidermal hemosiderin deposition is present.  No surrounding, edema, erythema, lymphangitis, fluctuance, nor signs of infection.  No drainage present.  12 mm x 10 mm x 3 mm.  This area was debrided via excisional partial thickness debridement with a 15 scalpel blade.  Post debridement measurements were 8 mm x 8 mm x 1 mm in depth.      I have  reexamined the patient the results are consistent with the previously documented exam.    XR Foot 3+ View Right    Result Date: 1/28/2025  Narrative: IN-OFFICE IMAGING:  Standing, weightbearing, 3 view, AP, MO, Lateral, Right foot Indication:  Foot Pain Findings: Significant medial deviation of the first metatarsal with associated lateral deviation of the hallux at the metatarsal phalangeal joint.  Overlapping second hammertoe.  Osteopenia changes seen throughout consistent with the patient's age.  Anterior cyma line seen on lateral view.  No periosteal reactions nor osteolytic changes seen.  No occult fractures seen. Comparison: No comparison views available.     ASSESSMENT/PLAN     Diagnoses and all orders for this visit:    1. 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 Pressure injury of toe of right foot, stage 1    3. Pressure injury of right foot, stage 1    4. Foot pain, right    5. Hammer toes of both feet  -     Case Request; Standing  -     ceFAZolin (ANCEF) 2 g in sodium chloride 0.9 % 100 mL IVPB  -     Case Request    Other orders  -     Follow Anesthesia Guidelines / Protocol; Standing  -     Verify NPO Status; Standing    Comprehensive lower extremity examination and evaluation was performed.    Discussed findings and treatment plan including risks, benefits, and treatment options with patient in detail. Patient agreed with treatment plan.    Planned surgery:  Right 2nd toe amputation.    The risks and benefits of the surgery were discussed with the patient.  This discussion included possible complications of requiring further surgery, possible delayed wound healing, further surgery requiring amputation of the foot or leg, and also included the complication of death.  All the patient's questions were answered to their satisfaction.  Patient states they would like to proceed with the procedure.    Discussed with the patient at length surgical versus nonsurgical options.  Explained to the  patient in detail that surgical intervention is only indicated when the level of pain is such that it negatively affects her daily life.    It was explained to the patient that surgical interventions often times do not relieve all of the pain associated with the deformity    Risks and complications associated with surgical versus nonsurgical options were explained.  The patient understands that the problem will most likely continue to evolve and surgical intervention is the only treatment plan that actually corrects the deformities.    Upon discussion of non-surgical conservative option, surgical correction, post-operative requirements along with risk and benefits of the surgery along with expected outcomes, the patient states they would like to proceed with the scheduling surgery.      The patient has decided to move forward with surgical intervention understanding all of these risks and complications.    An After Visit Summary was printed and given to the patient at discharge, including (if requested) any available informative/educational handouts regarding diagnosis, treatment, or medications. All questions were answered to patient/family satisfaction. Should symptoms fail to improve or worsen they agree to call or return to clinic or to go to the Emergency Department. Discussed the importance of following up with any needed screening tests/labs/specialist appointments and any requested follow-up recommended by me today. Importance of maintaining follow-up discussed and patient accepts that missed appointments can delay diagnosis and potentially lead to worsening of conditions.    Return in about 7 weeks (around 3/18/2025) for Post Operative, X-ray needed., or sooner if acute issues arise.    I have reviewed the assessment and plan and verified the accuracy of it. No changes to assessment and plan since the information was documented. Rian Jo, GUILLERMINA 01/28/25     I have dictated this note utilizing Dragon  Dictation.  Please note that portions of this note were completed with a voice recognition program.  Part of this note may be an electronic transcription/translation of spoken language to printed text using the Dragon Dictation System.      This document has been electronically signed by Rian Jo DPM on January 28, 2025 13:55 EST

## 2025-01-30 RX ORDER — LISDEXAMFETAMINE DIMESYLATE 50 MG/1
50 CAPSULE ORAL EVERY MORNING
Qty: 30 CAPSULE | Refills: 0 | Status: SHIPPED | OUTPATIENT
Start: 2025-01-30 | End: 2025-03-01

## 2025-02-06 NOTE — PRE-PROCEDURE INSTRUCTIONS
IMPORTANT INSTRUCTIONS - PRE-ADMISSION TESTING  DO NOT EAT OR CHEW anything after midnight the night before your procedure.    You may have CLEAR liquids up to __2__ hours prior to ARRIVAL time.   Take the following medications the morning of your procedure with JUST A SIP OF WATER:  _______________________________________________________________________________________________________________________________________________________________________________________    DO NOT BRING your medications to the hospital with you, UNLESS something has changed since your PRE-Admission Testing appointment.  Hold all vitamins, supplements, and NSAIDS (Non- steroidal anti-inflammatory meds) for one week prior to surgery (you MAY take Tylenol or Acetaminophen).  If you are diabetic, check your blood sugar the morning of your procedure. If it is less than 70 or if you are feeling symptomatic, call the following number for further instructions: [phone]  Use your inhalers/nebulizers as usual, the morning of your procedure. BRING YOUR INHALERS with you.   Bring your CPAP or BIPAP to hospital, ONLY IF YOU WILL BE SPENDING THE NIGHT.   Make sure you have a ride home and have someone who will stay with you the day of your procedure after you go home.  If you have any questions, please call your Pre-Admission Testing Nurse, REAL___ at [phone]____.   Per anesthesia request, do not smoke for 24 hours before your procedure or as instructed by your surgeon.      Clear Liquid Diet        Find out when you need to start a clear liquid diet.   Think of “clear liquids” as anything you could read a newspaper through. This includes things like water, broth, sports drinks, or tea WITHOUT any kind of milk or cream.           Once you are told to start a clear liquid diet, only drink these things until 2 hours before arrival to the hospital or when the hospital says to stop. Total volume limitation: 8 oz.       Clear  liquids you CAN drink:   Water   Clear broth: beef, chicken, vegetable, or bone broth with nothing in it   Gatorade   Lemonade or Tenzin-aid   Soda   Tea, coffee (NO cream or honey)   Jell-O (without fruit)   Popsicles (without fruit or cream)   Italian ices   Juice without pulp: apple, white, grape   You may use salt, pepper, and sugar    Do NOT drink:   Milk or cream   Soy milk, almond milk, coconut milk, or other non-dairy drinks and   creamers   Milkshakes or smoothies   Tomato juice   Orange juice   Grapefruit juice   Cream soups or any other than broth         Clear Liquid Diet:  Do NOT eat any solid food.  Do NOT eat or suck on mints or candy.  Do NOT chew gum.  Do NOT drink thick liquids like milk or juice with pulp in it.  Do NOT add milk, cream, or anything like soy milk or almond milk to coffee or tea.       PREOPERATIVE (BEFORE SURGERY)              BATHING INSTRUCTIONS  Instructions:    You will need to shower 1  times utilizing the soap provided; at the times indicated   below:     MORNING OF SURGERY 2/7/25    Wash your hair and face with normal shampoo and soap, rinse it well before using the surgical soap.      In the shower, wet the skin completely with water from your neck to your feet. Apply the cleanser to your   body ONLY FROM THE NECK TO YOUR FEET.     Do NOT USE THE CLEANSER ON YOUR FACE, HEAD, OR GENITAL (PRIVATE) AREAS.   Keep it out of your eyes, ears, and mouth because of the risk of injury to those areas.      Scrub with a clean washcloth for each bath utilizing the soap provided from the top of your body to the   bottom starting at the neck area.      Pay close attention to your armpits, groin area, and the site of surgery.      Wash your body gently for 5 minutes. Stand outside the stream or turn off the water while scrubbing your   body. Do NOT wash with your regular soap after the surgical cleanser is used.      RINSE THE CLEANSER OFF COMPLETELY with plenty of water. Rinse the area  again thoroughly.      Dry off with a clean towel. The surgical soap can cause dryness; however do NOT APPLY LOTION,   CREAM, POWDER, and/or DEODORANT AFTER SHOWERING.     Be sure to where clean clothes after showering.      Ensure CLEAN BED LINENS AFTER FIRST wash with the surgical soap.      NO PETS ALLOWED IN THE BED with you after utilizing the surgical soap.

## 2025-02-07 ENCOUNTER — ANESTHESIA (OUTPATIENT)
Dept: PERIOP | Facility: HOSPITAL | Age: 80
End: 2025-02-07
Payer: MEDICARE

## 2025-02-07 ENCOUNTER — HOSPITAL ENCOUNTER (OUTPATIENT)
Facility: HOSPITAL | Age: 80
Setting detail: HOSPITAL OUTPATIENT SURGERY
Discharge: HOME | End: 2025-02-07
Attending: PODIATRIST | Admitting: PODIATRIST
Payer: MEDICARE

## 2025-02-07 ENCOUNTER — ANESTHESIA EVENT (OUTPATIENT)
Dept: PERIOP | Facility: HOSPITAL | Age: 80
End: 2025-02-07
Payer: MEDICARE

## 2025-02-07 VITALS
HEART RATE: 69 BPM | OXYGEN SATURATION: 97 % | TEMPERATURE: 97.8 F | WEIGHT: 154.98 LBS | SYSTOLIC BLOOD PRESSURE: 133 MMHG | BODY MASS INDEX: 32.53 KG/M2 | HEIGHT: 58 IN | DIASTOLIC BLOOD PRESSURE: 64 MMHG | RESPIRATION RATE: 16 BRPM

## 2025-02-07 DIAGNOSIS — M20.41 HAMMER TOES OF BOTH FEET: ICD-10-CM

## 2025-02-07 DIAGNOSIS — M20.42 HAMMER TOES OF BOTH FEET: ICD-10-CM

## 2025-02-07 PROCEDURE — 25010000002 DEXAMETHASONE PER 1 MG

## 2025-02-07 PROCEDURE — 25810000003 LACTATED RINGERS PER 1000 ML: Performed by: ANESTHESIOLOGY

## 2025-02-07 PROCEDURE — 25010000002 LIDOCAINE PF 2% 2 % SOLUTION

## 2025-02-07 PROCEDURE — 25010000002 PROPOFOL 10 MG/ML EMULSION

## 2025-02-07 PROCEDURE — 28820 AMPUTATION OF TOE: CPT | Performed by: PODIATRIST

## 2025-02-07 PROCEDURE — 88311 DECALCIFY TISSUE: CPT | Performed by: PODIATRIST

## 2025-02-07 PROCEDURE — 25010000002 MIDAZOLAM PER 1MG: Performed by: ANESTHESIOLOGY

## 2025-02-07 PROCEDURE — 25010000002 CEFAZOLIN PER 500 MG: Performed by: PODIATRIST

## 2025-02-07 PROCEDURE — 25010000002 FENTANYL CITRATE (PF) 50 MCG/ML SOLUTION

## 2025-02-07 PROCEDURE — 88305 TISSUE EXAM BY PATHOLOGIST: CPT | Performed by: PODIATRIST

## 2025-02-07 PROCEDURE — 25010000002 ONDANSETRON PER 1 MG

## 2025-02-07 PROCEDURE — 25010000002 BUPIVACAINE (PF) 0.25 % SOLUTION: Performed by: PODIATRIST

## 2025-02-07 RX ORDER — MAGNESIUM HYDROXIDE 1200 MG/15ML
LIQUID ORAL AS NEEDED
Status: DISCONTINUED | OUTPATIENT
Start: 2025-02-07 | End: 2025-02-07 | Stop reason: HOSPADM

## 2025-02-07 RX ORDER — TRAMADOL HYDROCHLORIDE 50 MG/1
50 TABLET ORAL EVERY 8 HOURS PRN
Qty: 30 TABLET | Refills: 0 | Status: SHIPPED | OUTPATIENT
Start: 2025-02-07

## 2025-02-07 RX ORDER — BUPIVACAINE HYDROCHLORIDE 2.5 MG/ML
INJECTION, SOLUTION EPIDURAL; INFILTRATION; INTRACAUDAL AS NEEDED
Status: DISCONTINUED | OUTPATIENT
Start: 2025-02-07 | End: 2025-02-07 | Stop reason: HOSPADM

## 2025-02-07 RX ORDER — MIDAZOLAM HYDROCHLORIDE 2 MG/2ML
1 INJECTION, SOLUTION INTRAMUSCULAR; INTRAVENOUS ONCE
Status: COMPLETED | OUTPATIENT
Start: 2025-02-07 | End: 2025-02-07

## 2025-02-07 RX ORDER — SODIUM CHLORIDE, SODIUM LACTATE, POTASSIUM CHLORIDE, CALCIUM CHLORIDE 600; 310; 30; 20 MG/100ML; MG/100ML; MG/100ML; MG/100ML
9 INJECTION, SOLUTION INTRAVENOUS CONTINUOUS PRN
Status: DISCONTINUED | OUTPATIENT
Start: 2025-02-07 | End: 2025-02-07 | Stop reason: HOSPADM

## 2025-02-07 RX ORDER — LIDOCAINE HYDROCHLORIDE 20 MG/ML
INJECTION, SOLUTION EPIDURAL; INFILTRATION; INTRACAUDAL; PERINEURAL AS NEEDED
Status: DISCONTINUED | OUTPATIENT
Start: 2025-02-07 | End: 2025-02-07 | Stop reason: SURG

## 2025-02-07 RX ORDER — ONDANSETRON 2 MG/ML
INJECTION INTRAMUSCULAR; INTRAVENOUS AS NEEDED
Status: DISCONTINUED | OUTPATIENT
Start: 2025-02-07 | End: 2025-02-07 | Stop reason: SURG

## 2025-02-07 RX ORDER — FENTANYL CITRATE 50 UG/ML
INJECTION, SOLUTION INTRAMUSCULAR; INTRAVENOUS AS NEEDED
Status: DISCONTINUED | OUTPATIENT
Start: 2025-02-07 | End: 2025-02-07 | Stop reason: SURG

## 2025-02-07 RX ORDER — ONDANSETRON 2 MG/ML
4 INJECTION INTRAMUSCULAR; INTRAVENOUS ONCE AS NEEDED
Status: DISCONTINUED | OUTPATIENT
Start: 2025-02-07 | End: 2025-02-07 | Stop reason: HOSPADM

## 2025-02-07 RX ORDER — ACETAMINOPHEN 500 MG
500 TABLET ORAL ONCE
Status: COMPLETED | OUTPATIENT
Start: 2025-02-07 | End: 2025-02-07

## 2025-02-07 RX ORDER — PROMETHAZINE HYDROCHLORIDE 25 MG/1
25 SUPPOSITORY RECTAL ONCE AS NEEDED
Status: DISCONTINUED | OUTPATIENT
Start: 2025-02-07 | End: 2025-02-07 | Stop reason: HOSPADM

## 2025-02-07 RX ORDER — PROMETHAZINE HYDROCHLORIDE 25 MG/1
25 TABLET ORAL ONCE AS NEEDED
Status: DISCONTINUED | OUTPATIENT
Start: 2025-02-07 | End: 2025-02-07 | Stop reason: HOSPADM

## 2025-02-07 RX ORDER — DEXAMETHASONE SODIUM PHOSPHATE 4 MG/ML
INJECTION, SOLUTION INTRA-ARTICULAR; INTRALESIONAL; INTRAMUSCULAR; INTRAVENOUS; SOFT TISSUE AS NEEDED
Status: DISCONTINUED | OUTPATIENT
Start: 2025-02-07 | End: 2025-02-07 | Stop reason: SURG

## 2025-02-07 RX ORDER — PHENYLEPHRINE HCL IN 0.9% NACL 0.5 MG/5ML
SYRINGE (ML) INTRAVENOUS AS NEEDED
Status: DISCONTINUED | OUTPATIENT
Start: 2025-02-07 | End: 2025-02-07 | Stop reason: SURG

## 2025-02-07 RX ORDER — OXYCODONE HYDROCHLORIDE 5 MG/1
5 TABLET ORAL
Status: DISCONTINUED | OUTPATIENT
Start: 2025-02-07 | End: 2025-02-07 | Stop reason: HOSPADM

## 2025-02-07 RX ORDER — PROPOFOL 10 MG/ML
VIAL (ML) INTRAVENOUS AS NEEDED
Status: DISCONTINUED | OUTPATIENT
Start: 2025-02-07 | End: 2025-02-07 | Stop reason: SURG

## 2025-02-07 RX ADMIN — PROPOFOL 60 MG: 10 INJECTION, EMULSION INTRAVENOUS at 14:08

## 2025-02-07 RX ADMIN — DEXAMETHASONE SODIUM PHOSPHATE 4 MG: 4 INJECTION, SOLUTION INTRAMUSCULAR; INTRAVENOUS at 14:07

## 2025-02-07 RX ADMIN — SODIUM CHLORIDE, POTASSIUM CHLORIDE, SODIUM LACTATE AND CALCIUM CHLORIDE 9 ML/HR: 600; 310; 30; 20 INJECTION, SOLUTION INTRAVENOUS at 13:17

## 2025-02-07 RX ADMIN — LIDOCAINE HYDROCHLORIDE 100 MG: 20 INJECTION, SOLUTION INTRAVENOUS at 14:08

## 2025-02-07 RX ADMIN — Medication 100 MCG: at 14:19

## 2025-02-07 RX ADMIN — Medication 100 MCG: at 14:29

## 2025-02-07 RX ADMIN — MIDAZOLAM HYDROCHLORIDE 1 MG: 1 INJECTION, SOLUTION INTRAMUSCULAR; INTRAVENOUS at 13:24

## 2025-02-07 RX ADMIN — ACETAMINOPHEN 500 MG: 500 TABLET ORAL at 13:17

## 2025-02-07 RX ADMIN — PROPOFOL 125 MCG/KG/MIN: 10 INJECTION, EMULSION INTRAVENOUS at 14:04

## 2025-02-07 RX ADMIN — PROPOFOL 40 MG: 10 INJECTION, EMULSION INTRAVENOUS at 14:14

## 2025-02-07 RX ADMIN — ONDANSETRON 4 MG: 2 INJECTION INTRAMUSCULAR; INTRAVENOUS at 14:07

## 2025-02-07 RX ADMIN — FENTANYL CITRATE 25 MCG: 50 INJECTION, SOLUTION INTRAMUSCULAR; INTRAVENOUS at 14:10

## 2025-02-07 RX ADMIN — SODIUM CHLORIDE 2 G: 9 INJECTION, SOLUTION INTRAVENOUS at 14:10

## 2025-02-07 NOTE — DISCHARGE INSTRUCTIONS
DISCHARGE INSTRUCTIONS  PODIATRY SURGERY       For your surgery you had:          Monitored anesthesia care  Local anesthesia  You may experience dizziness, drowsiness, or light-headedness for several hours following surgery  Do not stay alone today or tonight.  Limit your activity for 24 hours.  Resume your diet slowly.  Follow whatever special dietary instructions you may have been given by the doctor.  You should not drive or operate machinery or drink alcohol for 24 hours or while you are taking pain medication.You should not sign any legally binding documents.  DO NOT TOUCH DRESSING.  You may shower: KEEP DRESSING DRY.  Sleep with the injured part elevated on a pillow.  Follow verbal instructions of your doctor.  Sit with the lower leg propped up on a footstool or chair with pillows.    SPECIAL INSTRUCTIONS:      Last dose of pain medication was given at:   TYLENOL 1000MG AT 1:17 DO NOT EXCEED 4000MG IN 24HOURS Ice bag to injured area for 72 hours.  Apply 20 minutes on - 20 minutes off.  Never place ice directly on skin or cast.     Bend knee and rotate ankle for 5 minutes every hour to support circulation.  DO NOT REMOVE DRESSING. KEEP DRESSING DRY. You may try to bathe in a tub, while keeping foot out of tub.  Small amount of bleeding through bandage may occur and is normal, unless it becomes heavy or persists, call office in this case.  Eat well balanced diet high in protein and vitamin c, may take supplemental vitamins and calcium. Drink plenty of fluids and get plenty of rest with foot elevated.  Use crutches, walker or cane for ambulation depending on weight bearing status. No driving until released by MD.      NOTIFY THE PHYSICIAN IF YOU EXPERIENCE:  Numbness of toes.  Inability to move toes.  Extreme coldness, paleness or blue dis-coloration of toes.  Excessive swelling of affected surgical site or swelling that causes the cast to rub or cut into skin.  Pain unrelieved by pain  medication  Nausea/vomiting not relieved by prescribed medication  Unable to urinate in 6 hours after surgery  Temperature greater than 101 degree Fahrenheit or chills  If unable to reach your doctor, please go to the closest emergency room         [facility] Post-Surgical Instructions    Go directly home and try to keep your feet elevated by sitting in the back seat of the car and placing your foot on the seat. Have your prescriptions filled immediately.    Elevate feet above the level of the heart by supporting your feet and legs with pillows. Lying on a couch with 3-4 pillows works well. Elevation helps reduce swelling and should be used whenever you are resting.    Place an ice bag covered with a towel on your ankle area and/or behind your knee for no longer than 20 minutes, then keep ice off of foot for at least 20 minutes. The best way to remember this is 20 minutes on, then 20 minutes off. Continue this for the first week while awake. Ice helps to reduce swelling and inflammation. Do NOT sleep with ice on your foot or leg.    To promote circulation and healing, bend your knee and rotate your foot and ankle for 5 minutes each hour. Dangle your feet down for 1-2 minutes at least once per hour, then continue to elevate.    Keep the bandages or cast clean and dry. Do NOT remove your bandages under any circumstances without consulting Dr. Jo. You may bathe by hanging your foot outside of the tub, but DO NOT attempt to shower.    Take your pain medication only as directed. Avoid alcoholwhile taking medication. If you experience nausea or lightheadedness, remain lying down and contact the office. You may prefer to use aspirin Tylenol, Motrin or other over the counter pain reliever.    A small amount of bleeding through the bandage may occur and should not cause concern unless it becomes heavy or persists. If this happens, contact the office. Do not become alarmed if you notice a bruised  appearance to your feet or toes.    Eat a well-balanced diet high in protein and vitamin C. Take supplemental vitamins and calcium. Drink plenty of fluids and get plenty of rest with your feet elevated.    Blankets may be kept from irritating the surgical site by using a large cardboard box to cradle the covers over the feet.    Use of crutches, a walker, or a cane can be useful in public areas to protect your feet. Do not attempt to operate a motor vehicle until directed by Dr. Jo.    Call the office if any of the following occur: bleeding that won't stop, injury to foot, fever, wet bandages, redness with throbbing, and pain unrelieved by medication.   Statement Selected

## 2025-02-07 NOTE — ANESTHESIA POSTPROCEDURE EVALUATION
Patient: Erik Bhatia    Procedure Summary       Date: 02/07/25 Room / Location: [facility] OR 06 / [facility] MAIN OR    Anesthesia Start: 1400 Anesthesia Stop: 1441    Procedure: AMPUTATION DIGIT - Right second toe amputation via disarticulation; Plain language - Amputation of Right Second toe (Right: Foot) Diagnosis:       Hammer toes of both feet      (Hammer toes of both feet [M20.41, M20.42])    Surgeons: Rian Jo DPM Provider: Gonzalez Samayoa MD    Anesthesia Type: general ASA Status: 2            Anesthesia Type: general    Vitals  Vitals Value Taken Time   /63 02/07/25 1449   Temp 36.3 °C (97.4 °F) 02/07/25 1440   Pulse 73 02/07/25 1450   Resp 16 02/07/25 1445   SpO2 94 % 02/07/25 1450   Vitals shown include unfiled device data.        Post Anesthesia Care and Evaluation    Patient location during evaluation: bedside  Patient participation: complete - patient participated  Level of consciousness: awake and alert (answered all questions)  Pain score: 0  Pain management: adequate    Airway patency: patent  PONV Status: none  Cardiovascular status: acceptable and stable  Respiratory status: acceptable  Hydration status: acceptable

## 2025-02-07 NOTE — ANESTHESIA PREPROCEDURE EVALUATION
Anesthesia Evaluation     Patient summary reviewed and Nursing notes reviewed   no history of anesthetic complications:   NPO Solid Status: > 8 hours  NPO Liquid Status: > 2 hours           Airway   Mallampati: II  TM distance: >3 FB  Neck ROM: full  No difficulty expected  Dental - normal exam     Pulmonary - normal exam    breath sounds clear to auscultation  (+) asthma (stable, at baseline today),  Cardiovascular - normal exam  Exercise tolerance: good (4-7 METS)    Rhythm: regular  Rate: normal    (+) valvular problems/murmurs murmur, hyperlipidemia      Neuro/Psych  (+) numbness (feet), psychiatric history ADHD  GI/Hepatic/Renal/Endo    (+) hiatal hernia    Musculoskeletal     Abdominal    Substance History      OB/GYN          Other   arthritis,     ROS/Med Hx Other: PAT Nursing Notes unavailable.            Interpretation Summary 2022    Left ventricular ejection fraction appears to be 61 - 65%.  Left ventricular diastolic function is consistent with (grade Ia w/high LAP) impaired relaxation.  No evidence of significant valvular disease         Anesthesia Plan    ASA 2     general     (Patient understands anesthesia not responsible for dental damage.)  intravenous induction     Anesthetic plan, risks, benefits, and alternatives have been provided, discussed and informed consent has been obtained with: patient.    Plan discussed with CRNA.        CODE STATUS:

## 2025-02-07 NOTE — OP NOTE
PREOPERATIVE DIAGNOSES:  Right second hammertoe     PROCEDURES PERFORMED:  Right second toe amputation at the metatarsal phalangeal joint; CPT:  71765    ANESTHESIA:  Local with MAC     HEMOSTASIS:   None     ESTIMATED BLOOD LOSS:  1 mL.     SPECIMENS:  Soft tissue and bone from right second toe     DRAINS:  None     COMPLICATIONS:  None.     IMPLANTS:  None     SUTURES:  3-0 nylon     INJECTABLES:  10 mL of 0.25% Marcaine plain     DESCRIPTION OF PROCEDURE:  Written consent was obtained from the patient prior to any medications or sedation being administered.     Under mild sedation, the patient was brought to the operating room and placed on the operating table in the supine position.  A well padded calf tourniquet was placed about the patient's calf on the surgical limb.    Following IV anesthesia, local anesthesia was obtained around the distal right second ray utilizing a total of 10 mL of quarter percent Marcaine plain.      The foot was then scrubbed, prepped and draped in the usual aseptic manner.  Ab eschar denis bandage was utilized to exsanguinate the well padded calf tourniquet.     Attention was directed to the right second toe where a fishmouth incision was made immediately distal to the metatarsal phalangeal joint.      Care was taken to identify and retract all vital and neurovascular structures.  All bleeders were ligated and cauterized as necessary.     The incision was deepened to the metatarsal phalangeal joint which was disarticulated with crown and collar scissors.    The bone and soft tissue of the right second toe were removed in toto and sent for pathologic identification.    The wound was flushed with copious amounts of sterile normal saline.    Subcutaneous tissue was reapproximated with 3-0 Vicryl.     The skin edges were approximated and coapted utilizing 3-0 nylon utilizing simple interrupted suture techniques.    The tourniquet was deflated with a total tourniquest time of 13 minutes.   Prompt hyperemic response was seen to the surgical site of the right second toe amputation site and the 1, 3, 4, and 5 toes of the right foot.       The surgical site was dressed with a sterile compressive dressings consisting of Adaptic, 4 x 4's, Kerlix and 4 inch Ace wrap.     The patient tolerated the procedure and anesthesia well. The patient was transferred to the recovery room with vital signs stable and vascular status intact the 1, 3, 4, 5 toes of the right foot.      The surgery was performed with Brenda Joaquin RN, First Assist.  She performed as a first assist throughout the entire case with retracting, fixation, suturing, and postoperative dressing.    Following a period of postoperative monitoring, the patient will be discharged home, having been given written and oral postoperative instructions. The patient is to contact Dr. Jo for postoperative followup care and if any problems should arise.

## 2025-02-11 ENCOUNTER — OFFICE VISIT (OUTPATIENT)
Dept: PODIATRY | Facility: CLINIC | Age: 80
End: 2025-02-11
Payer: MEDICARE

## 2025-02-11 VITALS
WEIGHT: 153 LBS | OXYGEN SATURATION: 94 % | DIASTOLIC BLOOD PRESSURE: 70 MMHG | HEART RATE: 84 BPM | TEMPERATURE: 97.8 F | SYSTOLIC BLOOD PRESSURE: 139 MMHG | BODY MASS INDEX: 32.12 KG/M2 | HEIGHT: 58 IN

## 2025-02-11 DIAGNOSIS — M20.41 HAMMER TOE OF RIGHT FOOT: ICD-10-CM

## 2025-02-11 DIAGNOSIS — M79.671 FOOT PAIN, RIGHT: Primary | ICD-10-CM

## 2025-02-11 LAB
CYTO UR: NORMAL
LAB AP CASE REPORT: NORMAL
LAB AP CLINICAL INFORMATION: NORMAL
PATH REPORT.FINAL DX SPEC: NORMAL
PATH REPORT.GROSS SPEC: NORMAL

## 2025-02-11 NOTE — PROGRESS NOTES
[facility] - PODIATRY    Today's Date: 02/11/25    Patient Name: Erik Bhatia  MRN: [id number]  CSN: [id number]  PCP: Marco Antonio Leonard MD, last PCP visit: 10/8//2024  Referring Provider: No ref. provider found    SUBJECTIVE     Chief Complaint   Patient presents with    Right Foot - Post-op     Doing well     HPI: Erik Bhatia, a 79 y.o.female, comes presents to clinic with:    Procedure: Right second toe amputation site and metatarsal phalangeal joint  Date: 07 February 2025    Patient states they are doing well without complications.  Patient states they are following post-op instructions.  Patient states pain is controlled.      Patient denies any fevers, chills, nausea, vomiting, shortness of breathe, nor any other constitutional signs nor symptoms.      Past Medical History:   Diagnosis Date    ADHD (attention deficit hyperactivity disorder) about 2000    Allergic ?    Anemia ?    Ankle sprain Long time ago    Arthritis     Arthritis of back ?    Asthma     Bladder disorder     Bunion     Callus ?    Chronic allergic rhinitis     Chronic UTI     Corns and callus     CTS (carpal tunnel syndrome) 2002    Had surgeries in 2003 and 204    GERD (gastroesophageal reflux disease)     Hammertoe     Hiatal hernia     Ingrown toenail     Limb pain     Limb swelling     Mixed hyperlipidemia 08/10/2022    Numbness in feet     Periarthritis of shoulder 2024    SOB (shortness of breath)      Past Surgical History:   Procedure Laterality Date    AMPUTATION DIGIT Right 2/7/2025    Procedure: AMPUTATION DIGIT - Right second toe amputation via disarticulation; Plain language - Amputation of Right Second toe;  Surgeon: Rian Jo DPM;  Location: [facility];  Service: Podiatry;  Laterality: Right;    BREAST BIOPSY      CARPAL TUNNEL RELEASE      COLONOSCOPY  2020 and before    ENDOSCOPY      HAND SURGERY      Carpal tunnel listed anove    TRIGGER FINGER RELEASE      TRIGGER POINT INJECTION   "2001    Bilateral thumb releases in 2002     Family History   Problem Relation Age of Onset    Stomach cancer Father     Diabetes Father             Asthma Father     Cancer Father         stomach    Heart disease Mother     Diabetes Maternal Grandfather          long ago    Breast cancer Neg Hx     Ovarian cancer Neg Hx     Uterine cancer Neg Hx     Colon cancer Neg Hx      Social History     Socioeconomic History    Marital status:    Tobacco Use    Smoking status: Never     Passive exposure: Never    Smokeless tobacco: Never    Tobacco comments:     none   Vaping Use    Vaping status: Never Used   Substance and Sexual Activity    Alcohol use: Yes     Alcohol/week: 3.0 - 4.0 standard drinks of alcohol     Types: 3 - 4 Glasses of wine per week     Comment: Not every week    Drug use: Never    Sexual activity: Not Currently     Partners: Male     Birth control/protection: Post-menopausal     Comment: All birth control and sexual partner info from  past     Allergies   Allergen Reactions    Nitrofurantoin Irritability     \"MADE ME REALLY ANXIOUS\"      Current Outpatient Medications   Medication Sig Dispense Refill    albuterol sulfate  (90 Base) MCG/ACT inhaler Inhale 2 puffs Every 4 (Four) Hours As Needed for Wheezing. 18 g 1    aluminum hydroxide-magnesium carbonate (Gaviscon)  MG/15ML suspension oral suspension Take 1 mL by mouth Daily As Needed.      budesonide-formoterol (Symbicort) 160-4.5 MCG/ACT inhaler Inhale 2 puffs 2 (Two) Times a Day for 90 days. 30.6 g 3    cetirizine-pseudoephedrine (ZyrTEC-D) 5-120 MG per 12 hr tablet Take 2 tablets by mouth Daily. 180 tablet 1    Cholecalciferol 125 MCG (5000 UT) tablet 1 tablet Daily.      Diclofenac Sodium (VOLTAREN) 1 % gel gel Apply 4 g topically to the appropriate area as directed 4 (Four) Times a Day As Needed (right shoulder pain). 150 g 1    fluticasone (Flonase) 50 MCG/ACT nasal spray 2 sprays into the nostril(s) as " directed by provider Daily. 45 mL 3    lisdexamfetamine (Vyvanse) 50 MG capsule Take 1 capsule by mouth Every Morning for 30 days 30 capsule 0    magnesium, as, gluconate (MAGONATE) 500 (27 Mg) MG tablet Take 1 tablet by mouth Daily.      montelukast (SINGULAIR) 10 MG tablet Take 1 tablet by mouth Daily. 90 tablet 3    olopatadine (PATADAY) 0.2 % solution ophthalmic solution 1 drop.      omeprazole (priLOSEC) 20 MG capsule Take 1 capsule by mouth Daily. 90 capsule 3    potassium chloride (MICRO-K) 10 MEQ CR capsule Take 2 capsules by mouth Daily. 180 capsule 3    silver sulfadiazine (SILVADENE, SSD) 1 % cream APPLY TOPICALLY TWICE A DAY TO THE APPROPRIATE AREA AS DIRECTED 25 g 5    traMADol (ULTRAM) 50 MG tablet Take 1 tablet by mouth Every 8 (Eight) Hours As Needed for Moderate Pain. 30 tablet 0    triamcinolone (KENALOG) 0.1 % ointment APPLY TO AFFECTED AREA(S) THREE TIMES A DAY AS DIRECTED 30 g 1     No current facility-administered medications for this visit.     Review of Systems   Constitutional: Negative.    Skin:         Right 2nd toe amputation   All other systems reviewed and are negative.      OBJECTIVE     Vitals:    02/11/25 0752   BP: 139/70   Pulse: 84   Temp: 97.8 °F (36.6 °C)   SpO2: 94%         Body mass index is 31.98 kg/m².    Lab Results   Component Value Date    GLUCOSE 102 (H) 10/04/2024    CALCIUM 9.8 10/04/2024     10/04/2024    K 3.8 10/04/2024    CO2 25.1 10/04/2024     10/04/2024    BUN 18 10/04/2024    CREATININE 0.72 10/04/2024    EGFRIFNONA 79 09/10/2021    BCR 25.0 10/04/2024    ANIONGAP 13.9 10/04/2024       Patient seen in no apparent distress.      PHYSICAL EXAM:     Foot/Ankle Exam    GENERAL  Appearance:  appears stated age and elderly  Orientation:  AAOx3  Affect:  appropriate  Gait:  unimpaired  Assistance:  independent  Right shoe gear: surgical shoe  Left shoe gear: sandal    VASCULAR     Right Foot Vascularity   Normal vascular exam    Dorsalis pedis:   2+  Posterior tibial:  2+  Skin temperature:  warm  Edema grading:  None  CFT:  < 3 seconds  Pedal hair growth:  Present  Varicosities:  none     Left Foot Vascularity   Normal vascular exam    Dorsalis pedis:  2+  Posterior tibial:  2+  Skin temperature:  warm  Edema grading:  None  CFT:  < 3 seconds  Pedal hair growth:  Present  Varicosities:  none     NEUROLOGIC     Right Foot Neurologic   Normal sensation    Light touch sensation: normal  Vibratory sensation: normal  Hot/Cold sensation: normal  Protective Sensation using [place]-Ayush Monofilament:   Sites intact: 10  Sites tested: 10     Left Foot Neurologic   Normal sensation    Light touch sensation: normal  Vibratory sensation: normal  Hot/Cold sensation:  normal  Protective Sensation using [place]-Ayush Monofilament:   Sites intact: 10  Sites tested: 10    MUSCULOSKELETAL     Right Foot Musculoskeletal   Hammertoe:  Third toe, fourth toe and fifth toe  Hallux valgus: Yes       Left Foot Musculoskeletal   Hammertoe:  Second toe, third toe, fourth toe and fifth toe  Hallux valgus: Yes      MUSCLE STRENGTH     Right Foot Muscle Strength   Foot dorsiflexion:  4  Foot plantar flexion:  4  Foot inversion:  4  Foot eversion:  4     Left Foot Muscle Strength   Foot dorsiflexion:  4  Foot plantar flexion:  4  Foot inversion:  4  Foot eversion:  4    RANGE OF MOTION     Right Foot Range of Motion   Foot and ankle ROM within normal limits       Left Foot Range of Motion   Foot and ankle ROM within normal limits      DERMATOLOGIC      Right Foot Dermatologic   Skin  Right foot skin is intact.      Left Foot Dermatologic   Skin  Left foot skin is intact.      Right foot additional comments: Right foot shows dressing is dry and intact without signs of breakthrough.  Second toe amputation site shows sutures intact with skin edges well-coapted with no signs of dehiscence.  Healthy surgical skin edges.  No drainage present.  No edema, erythema, calor, lymphangitis,  nor signs of infection seen.    I have reexamined the patient the results are consistent with the previously documented exam.    XR Foot 3+ View Right    Result Date: 2/11/2025  Narrative: IN-OFFICE IMAGING:  Standing, weightbearing, 3 view, AP, MO, Lateral, Right foot Indication: Post Operative Findings: Amputation of second toe at metatarsal phalangeal joint. Comparison: No other changes seen compared to previous views.    ASSESSMENT/PLAN     Diagnoses and all orders for this visit:    1. Foot pain, right (Primary)    2. Hammer toe of right foot    Comprehensive lower extremity examination and evaluation was performed.    Discussed findings and treatment plan including risks, benefits, and treatment options with patient in detail. Patient agreed with treatment plan.    Light dressing was applied to the surgical site.  Patient may ambulate as tolerated in surgical shoe.  The patient states understanding and agreement with this plan.    An After Visit Summary was printed and given to the patient at discharge, including (if requested) any available informative/educational handouts regarding diagnosis, treatment, or medications. All questions were answered to patient/family satisfaction. Should symptoms fail to improve or worsen they agree to call or return to clinic or to go to the Emergency Department. Discussed the importance of following up with any needed screening tests/labs/specialist appointments and any requested follow-up recommended by me today. Importance of maintaining follow-up discussed and patient accepts that missed appointments can delay diagnosis and potentially lead to worsening of conditions.    Return in about 2 weeks (around 2/25/2025), or if symptoms worsen or fail to improve, for Post Operative, Suture Removal,  No X-ray needed., or sooner if acute issues arise.    I have reviewed the assessment and plan and verified the accuracy of it. No changes to assessment and plan since the information  was documented. Rian Jo DPM 02/11/25     I have dictated this note utilizing Dragon Dictation.  Please note that portions of this note were completed with a voice recognition program.  Part of this note may be an electronic transcription/translation of spoken language to printed text using the Dragon Dictation System.      This document has been electronically signed by Rian Jo DPM on February 11, 2025 08:33 EST

## 2025-02-12 ENCOUNTER — HOSPITAL ENCOUNTER (OUTPATIENT)
Dept: MAMMOGRAPHY | Facility: HOSPITAL | Age: 80
Discharge: HOME | End: 2025-02-12
Admitting: INTERNAL MEDICINE
Payer: MEDICARE

## 2025-02-12 ENCOUNTER — HOSPITAL ENCOUNTER (OUTPATIENT)
Dept: MAMMOGRAPHY | Facility: HOSPITAL | Age: 80
Discharge: HOME | End: 2025-02-12
Payer: MEDICARE

## 2025-02-12 DIAGNOSIS — Z12.31 BREAST CANCER SCREENING BY MAMMOGRAM: ICD-10-CM

## 2025-02-12 PROCEDURE — 77067 SCR MAMMO BI INCL CAD: CPT

## 2025-02-12 PROCEDURE — 77063 BREAST TOMOSYNTHESIS BI: CPT

## 2025-02-13 ENCOUNTER — TELEPHONE (OUTPATIENT)
Age: 80
End: 2025-02-13
Payer: MEDICARE

## 2025-02-13 DIAGNOSIS — Z12.31 ENCOUNTER FOR SCREENING MAMMOGRAM FOR BREAST CANCER: Primary | ICD-10-CM

## 2025-02-13 NOTE — TELEPHONE ENCOUNTER
----- Message from Marco Antonio Leonard sent at 2/12/2025  2:56 PM EST -----  Please let patient know her screening mammogram is normal and please place an order for another next year.

## 2025-02-26 ENCOUNTER — OFFICE VISIT (OUTPATIENT)
Dept: PODIATRY | Facility: CLINIC | Age: 80
End: 2025-02-26
Payer: MEDICARE

## 2025-02-26 VITALS
WEIGHT: 152 LBS | TEMPERATURE: 97.1 F | OXYGEN SATURATION: 98 % | HEART RATE: 102 BPM | HEIGHT: 58 IN | DIASTOLIC BLOOD PRESSURE: 75 MMHG | SYSTOLIC BLOOD PRESSURE: 127 MMHG | BODY MASS INDEX: 31.91 KG/M2

## 2025-02-26 DIAGNOSIS — M20.41 HAMMER TOE OF RIGHT FOOT: ICD-10-CM

## 2025-02-26 DIAGNOSIS — M79.671 FOOT PAIN, RIGHT: Primary | ICD-10-CM

## 2025-02-26 DIAGNOSIS — M20.12 VALGUS DEFORMITY OF BOTH GREAT TOES: ICD-10-CM

## 2025-02-26 DIAGNOSIS — B35.1 ONYCHOMYCOSIS: ICD-10-CM

## 2025-02-26 DIAGNOSIS — L60.0 ONYCHOCRYPTOSIS: ICD-10-CM

## 2025-02-26 DIAGNOSIS — M79.672 FOOT PAIN, BILATERAL: ICD-10-CM

## 2025-02-26 DIAGNOSIS — M20.11 VALGUS DEFORMITY OF BOTH GREAT TOES: ICD-10-CM

## 2025-02-26 DIAGNOSIS — M79.671 FOOT PAIN, BILATERAL: ICD-10-CM

## 2025-02-26 RX ORDER — SODIUM FLUORIDE 5 MG/ML
PASTE, DENTIFRICE DENTAL
COMMUNITY
Start: 2025-02-12

## 2025-02-26 NOTE — PROGRESS NOTES
[facility] - PODIATRY    Today's Date: 02/26/25    Patient Name: Erik Bhatia  MRN: [id number]  CSN: [id number]  PCP: Marco Antonio Leonard MD, last PCP visit: 10/8/2024  Referring Provider: No ref. provider found    SUBJECTIVE     Chief Complaint   Patient presents with    Right Foot - Post-op Follow-up, Nail Problem, Follow-up     Doing well     Left Foot - Follow-up, Nail Problem     HPI: Erik Bhatia, a 79 y.o.female, comes presents to clinic with chief complaint of:    New, Established, New Problem: Established    Location: Toenails    Duration:  Greater than five years    Onset: Gradual    Nature: sore with palpation.    Stable, worsening, improving: recurring    Aggravating factors: Pain with shoe gear and ambulation.    Previous Treatment:  Debridement    Procedure: Right second toe amputation site and metatarsal phalangeal joint  Date: 07 February 2025     Patient states they are doing well without complications.  Patient states they are following post-op instructions.  Patient states pain is controlled.       Patient denies any fevers, chills, nausea, vomiting, shortness of breathe, nor any other constitutional signs nor symptoms.    I have reviewed/confirmed previously documented HPI with no changes.     Past Medical History:   Diagnosis Date    ADHD (attention deficit hyperactivity disorder) about 2000    Allergic ?    Anemia ?    Ankle sprain Long time ago    Arthritis     Arthritis of back ?    Asthma     Bladder disorder     Bunion     Callus ?    Chronic allergic rhinitis     Chronic UTI     Corns and callus     CTS (carpal tunnel syndrome) 2002    Had surgeries in 2003 and 204    GERD (gastroesophageal reflux disease)     Hammertoe     Hiatal hernia     Ingrown toenail     Limb pain     Limb swelling     Mixed hyperlipidemia 08/10/2022    Numbness in feet     Periarthritis of shoulder 2024    SOB (shortness of breath)      Past Surgical History:   Procedure Laterality Date     "AMPUTATION DIGIT Right 2025    Procedure: AMPUTATION DIGIT - Right second toe amputation via disarticulation; Plain language - Amputation of Right Second toe;  Surgeon: Rian Jo DPM;  Location: [facility] MAIN OR;  Service: Podiatry;  Laterality: Right;    BREAST BIOPSY      CARPAL TUNNEL RELEASE      COLONOSCOPY   and before    ENDOSCOPY      HAND SURGERY      Carpal tunnel listed anove    TRIGGER FINGER RELEASE      TRIGGER POINT INJECTION      Bilateral thumb releases in      Family History   Problem Relation Age of Onset    Stomach cancer Father     Diabetes Father             Asthma Father     Cancer Father         stomach    Heart disease Mother     Diabetes Maternal Grandfather          long ago    Breast cancer Neg Hx     Ovarian cancer Neg Hx     Uterine cancer Neg Hx     Colon cancer Neg Hx      Social History     Socioeconomic History    Marital status:    Tobacco Use    Smoking status: Never     Passive exposure: Never    Smokeless tobacco: Never    Tobacco comments:     none   Vaping Use    Vaping status: Never Used   Substance and Sexual Activity    Alcohol use: Yes     Alcohol/week: 3.0 - 4.0 standard drinks of alcohol     Types: 3 - 4 Glasses of wine per week     Comment: Not every week    Drug use: Never    Sexual activity: Not Currently     Partners: Male     Birth control/protection: Post-menopausal     Comment: All birth control and sexual partner info from  past     Allergies   Allergen Reactions    Nitrofurantoin Irritability     \"MADE ME REALLY ANXIOUS\"      Current Outpatient Medications   Medication Sig Dispense Refill    albuterol sulfate  (90 Base) MCG/ACT inhaler Inhale 2 puffs Every 4 (Four) Hours As Needed for Wheezing. 18 g 1    aluminum hydroxide-magnesium carbonate (Gaviscon)  MG/15ML suspension oral suspension Take 1 mL by mouth Daily As Needed.      budesonide-formoterol (Symbicort) 160-4.5 MCG/ACT inhaler Inhale 2 " puffs 2 (Two) Times a Day for 90 days. 30.6 g 3    cetirizine-pseudoephedrine (ZyrTEC-D) 5-120 MG per 12 hr tablet Take 2 tablets by mouth Daily. 180 tablet 1    Cholecalciferol 125 MCG (5000 UT) tablet 1 tablet Daily.      Diclofenac Sodium (VOLTAREN) 1 % gel gel Apply 4 g topically to the appropriate area as directed 4 (Four) Times a Day As Needed (right shoulder pain). 150 g 1    fluticasone (Flonase) 50 MCG/ACT nasal spray 2 sprays into the nostril(s) as directed by provider Daily. 45 mL 3    lisdexamfetamine (Vyvanse) 50 MG capsule Take 1 capsule by mouth Every Morning for 30 days 30 capsule 0    magnesium, as, gluconate (MAGONATE) 500 (27 Mg) MG tablet Take 1 tablet by mouth Daily.      montelukast (SINGULAIR) 10 MG tablet Take 1 tablet by mouth Daily. 90 tablet 3    olopatadine (PATADAY) 0.2 % solution ophthalmic solution 1 drop.      omeprazole (priLOSEC) 20 MG capsule Take 1 capsule by mouth Daily. 90 capsule 3    potassium chloride (MICRO-K) 10 MEQ CR capsule Take 2 capsules by mouth Daily. 180 capsule 3    PreviDent 5000 Plus 1.1 % cream       silver sulfadiazine (SILVADENE, SSD) 1 % cream APPLY TOPICALLY TWICE A DAY TO THE APPROPRIATE AREA AS DIRECTED 25 g 5    triamcinolone (KENALOG) 0.1 % ointment APPLY TO AFFECTED AREA(S) THREE TIMES A DAY AS DIRECTED 30 g 1    traMADol (ULTRAM) 50 MG tablet Take 1 tablet by mouth Every 8 (Eight) Hours As Needed for Moderate Pain. (Patient not taking: Reported on 2/26/2025) 30 tablet 0     No current facility-administered medications for this visit.     Review of Systems   Constitutional: Negative.    Skin:         Painful toenails   All other systems reviewed and are negative.      OBJECTIVE     Vitals:    02/26/25 0924   BP: 127/75   Pulse: 102   Temp: 97.1 °F (36.2 °C)   SpO2: 98%         Body mass index is 31.77 kg/m².    Lab Results   Component Value Date    GLUCOSE 102 (H) 10/04/2024    CALCIUM 9.8 10/04/2024     10/04/2024    K 3.8 10/04/2024    CO2 25.1  10/04/2024     10/04/2024    BUN 18 10/04/2024    CREATININE 0.72 10/04/2024    EGFRIFNONA 79 09/10/2021    BCR 25.0 10/04/2024    ANIONGAP 13.9 10/04/2024       Patient seen in no apparent distress.      PHYSICAL EXAM:     Foot/Ankle Exam    GENERAL  Appearance:  appears stated age and elderly  Orientation:  AAOx3  Affect:  appropriate  Gait:  unimpaired  Assistance:  independent  Right shoe gear: casual shoe  Left shoe gear: casual shoe    VASCULAR     Right Foot Vascularity   Normal vascular exam    Dorsalis pedis:  2+  Posterior tibial:  2+  Skin temperature:  warm  Edema grading:  None  CFT:  < 3 seconds  Pedal hair growth:  Present  Varicosities:  none     Left Foot Vascularity   Normal vascular exam    Dorsalis pedis:  2+  Posterior tibial:  2+  Skin temperature:  warm  Edema grading:  None  CFT:  < 3 seconds  Pedal hair growth:  Present  Varicosities:  none     NEUROLOGIC     Right Foot Neurologic   Normal sensation    Light touch sensation: normal  Vibratory sensation: normal  Hot/Cold sensation: normal  Protective Sensation using [place]-Ayush Monofilament:   Sites intact: 10  Sites tested: 10     Left Foot Neurologic   Normal sensation    Light touch sensation: normal  Vibratory sensation: normal  Hot/Cold sensation:  normal  Protective Sensation using [place]-Ayush Monofilament:   Sites intact: 10  Sites tested: 10    MUSCULOSKELETAL     Right Foot Musculoskeletal   Hammertoe:  Third toe, fourth toe and fifth toe  Hallux valgus: Yes       Left Foot Musculoskeletal   Hammertoe:  Second toe, third toe, fourth toe and fifth toe  Hallux valgus: Yes      MUSCLE STRENGTH     Right Foot Muscle Strength   Foot dorsiflexion:  4  Foot plantar flexion:  4  Foot inversion:  4  Foot eversion:  4     Left Foot Muscle Strength   Foot dorsiflexion:  4  Foot plantar flexion:  4  Foot inversion:  4  Foot eversion:  4    RANGE OF MOTION     Right Foot Range of Motion   Foot and ankle ROM within normal limits        Left Foot Range of Motion   Foot and ankle ROM within normal limits      DERMATOLOGIC      Right Foot Dermatologic   Skin  Positive for ulcer.   Nails  1.  Positive for elongated, onychomycosis, abnormal thickness, subungual debris and ingrown toenail.  2.  Absent.  3.  Positive for elongated, onychomycosis, abnormal thickness, subungual debris and ingrown toenail.  4.  Positive for elongated, onychomycosis, abnormal thickness, subungual debris and ingrown toenail.  5.  Positive for elongated, onychomycosis, abnormal thickness, subungual debris and ingrown toenail.     Left Foot Dermatologic   Skin  Left foot skin is intact.   Nails  1.  Positive for elongated, onychomycosis, abnormal thickness, subungual debris and ingrown toenail.  2.  Positive for elongated, onychomycosis, abnormal thickness, subungual debris and ingrown toenail.  3.  Positive for elongated, onychomycosis, abnormal thickness, subungual debris and ingrown toenail.  4.  Positive for elongated, onychomycosis, abnormally thick, subungual debris and ingrown toenail.  5.  Positive for elongated, onychomycosis, abnormally thick, subungual debris and ingrown toenail.     Right foot additional comments: Right foot shows dressing is dry and intact without signs of breakthrough.  Second toe amputation site shows sutures intact with skin edges well-coapted with no signs of dehiscence.  Healthy surgical skin edges.  No drainage present.  No edema, erythema, calor, lymphangitis, nor signs of infection seen.    Upon removal of sutures, skin edges remain well-coapted with no signs of dehiscence.    I have reexamined the patient the results are consistent with the previously documented exam.    ASSESSMENT/PLAN     Diagnoses and all orders for this visit:    1. Foot pain, right (Primary)    2. Hammer toe of right foot    3. Valgus deformity of both great toes    4. Foot pain, bilateral    5. Onychomycosis    6. Onychocryptosis    Comprehensive lower extremity  examination and evaluation was performed.    Discussed findings and treatment plan including risks, benefits, and treatment options with patient in detail. Patient agreed with treatment plan.    Toenails 1, 3, 4, 5 on Right and 1, 2, 3, 4, 5 on Left were debrided with nail nippers then filed with a Dremel nail elisa.  Patient tolerated procedure well without complications.    An After Visit Summary was printed and given to the patient at discharge, including (if requested) any available informative/educational handouts regarding diagnosis, treatment, or medications. All questions were answered to patient/family satisfaction. Should symptoms fail to improve or worsen they agree to call or return to clinic or to go to the Emergency Department. Discussed the importance of following up with any needed screening tests/labs/specialist appointments and any requested follow-up recommended by me today. Importance of maintaining follow-up discussed and patient accepts that missed appointments can delay diagnosis and potentially lead to worsening of conditions.    Return in about 9 weeks (around 4/30/2025) for Toenail Care., or sooner if acute issues arise.    I have reviewed the assessment and plan and verified the accuracy of it. No changes to assessment and plan since the information was documented. Rian Jo DPM 02/26/25     I have dictated this note utilizing Dragon Dictation.  Please note that portions of this note were completed with a voice recognition program.  Part of this note may be an electronic transcription/translation of spoken language to printed text using the Dragon Dictation System.      This document has been electronically signed by Rian Jo DPM on February 26, 2025 09:37 EST

## 2025-03-03 ENCOUNTER — OFFICE VISIT (OUTPATIENT)
Dept: OBSTETRICS AND GYNECOLOGY | Facility: CLINIC | Age: 80
End: 2025-03-03
Payer: MEDICARE

## 2025-03-03 VITALS
SYSTOLIC BLOOD PRESSURE: 115 MMHG | BODY MASS INDEX: 31.7 KG/M2 | WEIGHT: 151 LBS | HEART RATE: 101 BPM | HEIGHT: 58 IN | DIASTOLIC BLOOD PRESSURE: 77 MMHG

## 2025-03-03 DIAGNOSIS — N95.2 VAGINAL ATROPHY: ICD-10-CM

## 2025-03-03 DIAGNOSIS — L89.891 PRESSURE INJURY OF TOE OF RIGHT FOOT, STAGE 1: Primary | ICD-10-CM

## 2025-03-03 DIAGNOSIS — Z01.419 WELL WOMAN EXAM WITH ROUTINE GYNECOLOGICAL EXAM: Primary | ICD-10-CM

## 2025-03-03 DIAGNOSIS — B37.2 YEAST DERMATITIS: ICD-10-CM

## 2025-03-03 RX ORDER — AMOXICILLIN 500 MG/1
500 CAPSULE ORAL 2 TIMES DAILY
Qty: 10 CAPSULE | Refills: 0 | Status: SHIPPED | OUTPATIENT
Start: 2025-03-03

## 2025-03-03 RX ORDER — ESTRADIOL 0.1 MG/G
CREAM VAGINAL
Qty: 42.5 G | Refills: 3 | Status: SHIPPED | OUTPATIENT
Start: 2025-03-03

## 2025-03-03 RX ORDER — NYSTATIN 100000 [USP'U]/G
POWDER TOPICAL 2 TIMES DAILY
Qty: 45 G | Refills: 3 | Status: SHIPPED | OUTPATIENT
Start: 2025-03-03

## 2025-03-03 NOTE — PROGRESS NOTES
"Well Woman Visit    CC: Scheduled annual well gyn visit  Chief Complaint   Patient presents with    Annual Exam         Post menopausal, using no HRT    Last Completed Pap Smear       This patient has no relevant Health Maintenance data.           Last Completed Mammogram       This patient has no relevant Health Maintenance data.             HPI:   79 y.o.     History of Present Illness  The patient presents for an annual exam.    She reports no current health concerns and overall feels well. Her last mammogram was conducted earlier this month, yielding normal results. She has been advised that further colonoscopies are not necessary.    She has been experiencing a persistent rash for the past 4 to 5 months, accompanied by dry skin. She was referred to Dr. Dangelo by Dr. Guthrie, who prescribed a medicated cream. However, she believes this treatment exacerbated the condition. She has been managing the symptoms with CeraVe and a medicated powder. She is uncertain if Dr. Dangelo considered a yeast infection as a potential cause. She typically experiences similar symptoms during the summer, but their occurrence in winter is unusual for her.    She continues to use estrogen cream, although inconsistently due to occasional forgetfulness.    MEDICATIONS  Current: estrogen cream, CeraVe   PCP: does manage PMHx and preventative labs  History: PMHx, Meds, Allergies, PSHx, Social Hx, and POBHx all reviewed and updated.    PHYSICAL EXAM:  /77   Pulse 101   Ht 147.3 cm (58\")   Wt 68.5 kg (151 lb)   BMI 31.56 kg/m²  Not found.  General- NAD, alert and oriented, appropriate  Psych- Normal mood, good memory  Neck- No masses, no thyroid enlargement  CV- Regular rhythm, no murnurs  Resp- CTA to bases, no wheezes  Abdomen- Soft, non distended, non tender, no masses  Breast left-  Bilaterally symmetrical, no masses, non tender, no nipple discharge, under the breast is an erythematous rash with multiple satellite lesions " consistent with yeast  Breast right- Bilaterally symmetrical, no masses, non tender, no nipple discharge  External genitalia- Normal female, no lesions, severe vulvovaginal atrophy with severe resorption bilateral labia minora  Urethra/meatus- Normal, no masses, non tender  Bladder- Normal, no masses, non tender  Vagina- Normal, severe atrophy, no lesions, no discharge.    Cvx- Normal, no lesions, no discharge, No cervical motion tenderness  Uterus- Normal size, shape & consistency.  Non tender, mobile.  Adnexa- No mass, non tender  Anus/Rectum/Perineum- Not performed  Lymphatic- No palpable neck, axillary, or groin nodes  Ext- No edema, no cyanosis    Skin- No lesions, no rashes, no acanthosis nigricans      ASSESSMENT and PLAN:    Diagnoses and all orders for this visit:    1. Well woman exam with routine gynecological exam (Primary)    2. Vaginal atrophy  -     estradiol (ESTRACE VAGINAL) 0.1 MG/GM vaginal cream; Place 0.5 gm PV and massage 0.5 gm to vulva and vestibule at night 2x/week  Dispense: 42.5 g; Refill: 3    3. Yeast dermatitis  -     nystatin (MYCOSTATIN) 156462 UNIT/GM powder; Apply  topically to the appropriate area as directed 2 (Two) Times a Day.  Dispense: 45 g; Refill: 3        Assessment & Plan  1. Annual examination.  Her last mammogram was conducted earlier this month, yielding normal results. She has been advised that further colonoscopies are not necessary.    2. Rash under the breast.  The rash appears to be a yeast infection. A prescription for nystatin powder will be provided to manage the rash.    3. Medication management.  She continues to use estrogen cream, although inconsistently due to occasional forgetfulness. A refill for her estrogen cream will be sent to the pharmacy.       Preventative:  BREAST HEALTH- Monthly self breast exam importance and how to reviewed. MMG and/or MRI (prn) reviewed per society guidelines and her individual history. Screen: Already up to date  CERVICAL  CANCER Screening- Reviewed current ASCCP guidelines for screening w and wo cotest HPV, age specific.  Screen: Not medically needed  COLON CANCER Screening- Reviewed current medical society guidelines and options.  Screen:  Not medically needed  BONE HEALTH- Reviewed current medical society guidelines and options for testing, calcium and vit D intake.  Weight bearing exercise.  DEXA: Already up to date  Follow up PCP/Specialist PMHx and/or Labs      She understands the importance of having any ordered tests to be performed in a timely fashion.  The risks of not performing them include, but are not limited to, advanced cancer stages, bone loss from osteoporosis and/or subsequent increase in morbidity and/or mortality.  She is encouraged to review her results online and/or contact or office if she has questions.     Follow Up:  Return in about 1 year (around 3/3/2026) for Annual physical.            Maya Lagunas MD  03/03/2025    [facility]  [address] [place] [address] [address] [place] [address]  Dept: [phone]  Dept Fax: [phone]  Loc: [phone]  Loc Fax: [phone]     Patient or patient representative verbalized consent for the use of Ambient Listening during the visit with  Maya Laguans MD for chart documentation. 3/3/2025  13:12 EST

## 2025-03-12 DIAGNOSIS — F98.8 ATTENTION DEFICIT DISORDER (ADD) WITHOUT HYPERACTIVITY: ICD-10-CM

## 2025-03-12 RX ORDER — LISDEXAMFETAMINE DIMESYLATE 50 MG/1
50 CAPSULE ORAL EVERY MORNING
Qty: 30 CAPSULE | Refills: 0 | Status: SHIPPED | OUTPATIENT
Start: 2025-03-12 | End: 2025-04-11

## 2025-03-12 NOTE — TELEPHONE ENCOUNTER
Caller: Erik Bhatia    Relationship: Self    Best call back number: [phone]    Requested Prescriptions:   Requested Prescriptions     Pending Prescriptions Disp Refills    lisdexamfetamine (Vyvanse) 50 MG capsule 30 capsule 0     Sig: Take 1 capsule by mouth Every Morning for 30 days        Pharmacy where request should be sent: [facility] [id number]  [place], [place] - [address] AT [facility] [address]  [phone] [facility] [phone] FX     Last office visit with prescribing clinician: 10/8/2024   Last telemedicine visit with prescribing clinician: Visit date not found   Next office visit with prescribing clinician: 4/8/2025     Additional details provided by patient:     Does the patient have less than a 3 day supply:  [x] Yes  [] No        Rosanna Frias Rep   03/12/25 11:57 EDT

## 2025-04-07 ENCOUNTER — LAB (OUTPATIENT)
Dept: LAB | Facility: HOSPITAL | Age: 80
End: 2025-04-07
Payer: MEDICARE

## 2025-04-07 DIAGNOSIS — R73.01 IMPAIRED FASTING GLUCOSE: ICD-10-CM

## 2025-04-07 DIAGNOSIS — E55.9 VITAMIN D DEFICIENCY: ICD-10-CM

## 2025-04-07 DIAGNOSIS — E78.2 MIXED HYPERLIPIDEMIA: ICD-10-CM

## 2025-04-07 LAB
25(OH)D3 SERPL-MCNC: 27.1 NG/ML (ref 30–100)
ALBUMIN SERPL-MCNC: 4.2 G/DL (ref 3.5–5.2)
ALBUMIN/GLOB SERPL: 1.2 G/DL
ALP SERPL-CCNC: 106 U/L (ref 39–117)
ALT SERPL W P-5'-P-CCNC: 12 U/L (ref 1–33)
ANION GAP SERPL CALCULATED.3IONS-SCNC: 10.3 MMOL/L (ref 5–15)
AST SERPL-CCNC: 23 U/L (ref 1–32)
BILIRUB SERPL-MCNC: 0.3 MG/DL (ref 0–1.2)
BUN SERPL-MCNC: 18 MG/DL (ref 8–23)
BUN/CREAT SERPL: 24.7 (ref 7–25)
CALCIUM SPEC-SCNC: 9.4 MG/DL (ref 8.6–10.5)
CHLORIDE SERPL-SCNC: 103 MMOL/L (ref 98–107)
CHOLEST SERPL-MCNC: 206 MG/DL (ref 0–200)
CO2 SERPL-SCNC: 24.7 MMOL/L (ref 22–29)
CREAT SERPL-MCNC: 0.73 MG/DL (ref 0.57–1)
EGFRCR SERPLBLD CKD-EPI 2021: 83.3 ML/MIN/1.73
GLOBULIN UR ELPH-MCNC: 3.5 GM/DL
GLUCOSE SERPL-MCNC: 87 MG/DL (ref 65–99)
HBA1C MFR BLD: 6.1 % (ref 4.8–5.6)
HDLC SERPL-MCNC: 61 MG/DL (ref 40–60)
LDLC SERPL CALC-MCNC: 131 MG/DL (ref 0–100)
LDLC/HDLC SERPL: 2.12 {RATIO}
POTASSIUM SERPL-SCNC: 4.1 MMOL/L (ref 3.5–5.2)
PROT SERPL-MCNC: 7.7 G/DL (ref 6–8.5)
SODIUM SERPL-SCNC: 138 MMOL/L (ref 136–145)
TRIGL SERPL-MCNC: 79 MG/DL (ref 0–150)
VLDLC SERPL-MCNC: 14 MG/DL (ref 5–40)

## 2025-04-07 PROCEDURE — 80053 COMPREHEN METABOLIC PANEL: CPT

## 2025-04-07 PROCEDURE — 80061 LIPID PANEL: CPT

## 2025-04-07 PROCEDURE — 83036 HEMOGLOBIN GLYCOSYLATED A1C: CPT

## 2025-04-07 PROCEDURE — 82306 VITAMIN D 25 HYDROXY: CPT

## 2025-04-07 PROCEDURE — 36415 COLL VENOUS BLD VENIPUNCTURE: CPT

## 2025-04-08 ENCOUNTER — OFFICE VISIT (OUTPATIENT)
Age: 80
End: 2025-04-08
Payer: MEDICARE

## 2025-04-08 VITALS
DIASTOLIC BLOOD PRESSURE: 81 MMHG | OXYGEN SATURATION: 96 % | BODY MASS INDEX: 32.7 KG/M2 | TEMPERATURE: 97.8 F | WEIGHT: 155.8 LBS | HEART RATE: 94 BPM | HEIGHT: 58 IN | SYSTOLIC BLOOD PRESSURE: 138 MMHG

## 2025-04-08 DIAGNOSIS — D50.8 IRON DEFICIENCY ANEMIA DUE TO DIETARY CAUSES: ICD-10-CM

## 2025-04-08 DIAGNOSIS — F98.8 ATTENTION DEFICIT DISORDER (ADD) WITHOUT HYPERACTIVITY: ICD-10-CM

## 2025-04-08 DIAGNOSIS — E55.9 VITAMIN D DEFICIENCY: ICD-10-CM

## 2025-04-08 DIAGNOSIS — J45.40 MODERATE PERSISTENT ASTHMA WITHOUT COMPLICATION: Primary | ICD-10-CM

## 2025-04-08 DIAGNOSIS — E78.2 MIXED HYPERLIPIDEMIA: ICD-10-CM

## 2025-04-08 DIAGNOSIS — R73.01 IMPAIRED FASTING GLUCOSE: ICD-10-CM

## 2025-04-08 DIAGNOSIS — Z00.00 WELL ADULT EXAM: ICD-10-CM

## 2025-04-08 PROBLEM — M20.42 HAMMER TOES OF BOTH FEET: Status: RESOLVED | Noted: 2025-01-28 | Resolved: 2025-04-08

## 2025-04-08 PROBLEM — M20.41 HAMMER TOES OF BOTH FEET: Status: RESOLVED | Noted: 2025-01-28 | Resolved: 2025-04-08

## 2025-04-08 RX ORDER — ALBUTEROL SULFATE 90 UG/1
2 INHALANT RESPIRATORY (INHALATION) EVERY 4 HOURS PRN
Qty: 18 G | Refills: 1 | Status: SHIPPED | OUTPATIENT
Start: 2025-04-08

## 2025-04-08 RX ORDER — ALBUTEROL SULFATE AND BUDESONIDE 90; 80 UG/1; UG/1
2 AEROSOL, METERED RESPIRATORY (INHALATION) EVERY 4 HOURS PRN
Qty: 10.7 G | Refills: 1 | Status: SHIPPED | OUTPATIENT
Start: 2025-04-08

## 2025-04-08 RX ORDER — LISDEXAMFETAMINE DIMESYLATE 50 MG/1
50 CAPSULE ORAL EVERY MORNING
Qty: 30 CAPSULE | Refills: 0 | Status: SHIPPED | OUTPATIENT
Start: 2025-04-14 | End: 2025-05-14

## 2025-04-08 NOTE — PROGRESS NOTES
"Chief Complaint  Follow-up (6 month follow up)    Symone Bhatia presents to [facility]    History of Present Illness:  79 yo female, former pt of Dr Posey, who has has underlying ADD, RAD, GERD, RLS, among others.  She was seen 8/22 as a New Patient, who is coming in now 4/25 for routine 6-month follow-up.  We will go over her med list, review any recent labs, and make further recommendations at that time.    Review of Systems   Constitutional:  Negative for appetite change, fatigue and fever.   HENT:  Negative for congestion and ear pain.    Eyes:  Negative for blurred vision.   Respiratory:  Negative for cough, chest tightness, shortness of breath and wheezing.    Cardiovascular:  Negative for chest pain, palpitations and leg swelling.   Gastrointestinal:  Negative for abdominal pain.   Genitourinary:  Negative for difficulty urinating, dysuria and hematuria.   Musculoskeletal:  Negative for arthralgias and gait problem.   Skin:  Negative for skin lesions.   Neurological:  Negative for syncope, memory problem and confusion.   Psychiatric/Behavioral:  Negative for self-injury and depressed mood.        Objective   Vital Signs:   /81 (BP Location: Right arm, Patient Position: Sitting, Cuff Size: Adult)   Pulse 94   Temp 97.8 °F (36.6 °C) (Oral)   Ht 147.3 cm (57.99\")   Wt 70.7 kg (155 lb 12.8 oz)   SpO2 96%   BMI 32.57 kg/m²           Physical Exam  Vitals and nursing note reviewed.   Constitutional:       General: She is not in acute distress.     Appearance: Normal appearance. She is not toxic-appearing.   HENT:      Head: Atraumatic.      Right Ear: External ear normal.      Left Ear: External ear normal.      Nose: Nose normal.      Mouth/Throat:      Mouth: Mucous membranes are moist.   Eyes:      General:         Right eye: No discharge.         Left eye: No discharge.      Extraocular Movements: Extraocular movements intact.      Pupils: " Pupils are equal, round, and reactive to light.   Cardiovascular:      Rate and Rhythm: Regular rhythm. Tachycardia present.      Pulses: Normal pulses.      Heart sounds: Normal heart sounds. No murmur heard.     No gallop.      Comments: Little tachycardic, but no ectopy.  Pulmonary:      Effort: Pulmonary effort is normal. No respiratory distress.      Breath sounds: No wheezing, rhonchi or rales.      Comments: No labored respiration.  Abdominal:      General: There is no distension.      Palpations: Abdomen is soft. There is no mass.      Tenderness: There is no abdominal tenderness. There is no guarding.   Musculoskeletal:         General: No swelling or tenderness.      Cervical back: No tenderness.      Right lower leg: No edema.      Left lower leg: No edema.      Comments: No edema.   Skin:     General: Skin is warm and dry.      Findings: No rash.   Neurological:      General: No focal deficit present.      Mental Status: She is alert and oriented to person, place, and time. Mental status is at baseline.      Motor: No weakness.      Gait: Gait normal.   Psychiatric:         Mood and Affect: Mood normal.         Thought Content: Thought content normal.          Result Review   The following data was reviewed by: Marco Antonio Leonard MD on 08/10/2022:  [x] Laboratory  [] Microbiology  [] Radiology  [] EKG/telemetry  [] Cardiology/Vascular  [] Pathology  [x] Old records             Assessment and Plan   Diagnoses and all orders for this visit:    1. Moderate persistent asthma without complication (Primary)  Overview:  Reviewed Dr. Borges's note from 3/22:  Asthma has been doing well.    Occasionally when she is outside she has to use albuterol rescue inhaler.  Assessment: environmental allergies and asthma.  Plan: Continue Symbicort 160-4.52 puffs twice daily, albuterol rescue inhaler as needed, Singulair 10 mg daily, Flonase 2 sniffs daily, Zyrtec-D which she has taken and tolerated for a long time.  Has seen  allergist in the past.    Assessment & Plan:  Patient is stable this regards as of her 4/25 OV.  No flares over the winter.  She has not had to use any albuterol recently.  She is maintained on Symbicort twice a day and also utilizes Singulair.  She has been on 12 hours Zyrtec-D for some time as well.    We will see if the Airsupra is any cheaper for her than plain albuterol, both prescriptions were sent this OV.      2. Mixed hyperlipidemia  Assessment & Plan:  Since LDL is stable around 130 as of her 4/25 OV.  Will just keeping an eye on this for routine visits, unless her sugars trend up significantly to where she is in the diabetic range, we will not be concerned with a statin.    Orders:  -     Lipid Panel; Future    3. Impaired fasting glucose  Overview:  Patient's father was diabetic on insulin since no pills available then.    Assessment & Plan:  A1C is stable at 6.1 as of 4/25 OV, no meds, watch carbs some, and will check on RTO.    Orders:  -     Hemoglobin A1c; Future    4. Iron deficiency anemia due to dietary causes    5. Attention deficit disorder (ADD) without hyperactivity  Assessment & Plan:  Patient continues to tolerate Vyvanse without any side effects, no palpitations no hypertension, so stable to continue current dosing as of 4/25 OV.    Orders:  -     lisdexamfetamine (Vyvanse) 50 MG capsule; Take 1 capsule by mouth Every Morning for 30 days  Dispense: 30 capsule; Refill: 0    6. Vitamin D deficiency  Assessment & Plan:  Vitamin D is up from 21 to 27 as of her 10/24 OV.  She is not too consistent with this over-the-counter, but at least is heading the right direction.---> still 27 as of 4/25 = just needs to be more consistent with it.    Orders:  -     Vitamin D,25-Hydroxy; Future    7. Well adult exam  -     Vitamin B12 anemia; Future  -     Folate anemia; Future  -     CBC & Differential; Future  -     Comprehensive Metabolic Panel; Future  -     TSH; Future  -     Magnesium; Future    Other  orders  -     Albuterol-Budesonide (Airsupra) 90-80 MCG/ACT aerosol; Inhale 2 puffs Every 4 (Four) Hours As Needed (Shortness of breath, wheezing).  Dispense: 10.7 g; Refill: 1  -     albuterol sulfate  (90 Base) MCG/ACT inhaler; Inhale 2 puffs Every 4 (Four) Hours As Needed for Wheezing.  Dispense: 18 g; Refill: 1                      Follow Up   Return in about 6 months (around 10/8/2025).  Patient was given instructions and counseling regarding her condition or for health maintenance advice. Please see specific information pulled into the AVS if appropriate.

## 2025-04-08 NOTE — ASSESSMENT & PLAN NOTE
Since LDL is stable around 130 as of her 4/25 OV.  Will just keeping an eye on this for routine visits, unless her sugars trend up significantly to where she is in the diabetic range, we will not be concerned with a statin.

## 2025-04-08 NOTE — ASSESSMENT & PLAN NOTE
Patient is stable this regards as of her 4/25 OV.  No flares over the winter.  She has not had to use any albuterol recently.  She is maintained on Symbicort twice a day and also utilizes Singulair.  She has been on 12 hours Zyrtec-D for some time as well.    We will see if the Airsupra is any cheaper for her than plain albuterol, both prescriptions were sent this OV.

## 2025-04-08 NOTE — ASSESSMENT & PLAN NOTE
Vitamin D is up from 21 to 27 as of her 10/24 OV.  She is not too consistent with this over-the-counter, but at least is heading the right direction.---> still 27 as of 4/25 = just needs to be more consistent with it.

## 2025-04-08 NOTE — ASSESSMENT & PLAN NOTE
Patient continues to tolerate Vyvanse without any side effects, no palpitations no hypertension, so stable to continue current dosing as of 4/25 OV.

## 2025-04-14 ENCOUNTER — TELEPHONE (OUTPATIENT)
Age: 80
End: 2025-04-14

## 2025-04-14 NOTE — TELEPHONE ENCOUNTER
Caller: [facility]    Relationship to patient: Other      Best call back number: [phone]     REFERENCE: [id number]     Provider: MD ESCALANTE    Medication PA needed:     lisdexamfetamine (Vyvanse) 50 MG capsule       Reason for call/Prior Auth: SCHEDULED FOR DENIAL 4.15.2025     STATES THAT INFORMATION REQUESTED WAS SUBMITTED INCOMPLETE. A FAX WAS SENT REQUESTING THE ADDITIONAL INFORMATION. PLEASE ADVISE.

## 2025-05-16 DIAGNOSIS — F98.8 ATTENTION DEFICIT DISORDER (ADD) WITHOUT HYPERACTIVITY: ICD-10-CM

## 2025-05-16 NOTE — TELEPHONE ENCOUNTER
Caller: Erik Bhatia    Relationship: Self    Best call back number: [phone]         Requested Prescriptions:   Requested Prescriptions     Pending Prescriptions Disp Refills    lisdexamfetamine (Vyvanse) 50 MG capsule 30 capsule 0     Sig: Take 1 capsule by mouth Every Morning for 30 days        Pharmacy where request should be sent: [facility] [id number]  [facility] [place] [facility] [address] AT [facility] [address] - [phone] [facility] [phone] FX     Last office visit with prescribing clinician: 4/8/2025   Last telemedicine visit with prescribing clinician: Visit date not found   Next office visit with prescribing clinician: 10/8/2025     Additional details provided by patient: LESS THAN THREE DAY SUPPLY.     Does the patient have less than a 3 day supply:  [x] Yes  [] No      Rosanna Delaney Rep   05/16/25 13:21 EDT

## 2025-05-19 RX ORDER — LISDEXAMFETAMINE DIMESYLATE 50 MG/1
50 CAPSULE ORAL EVERY MORNING
Qty: 30 CAPSULE | Refills: 0 | Status: SHIPPED | OUTPATIENT
Start: 2025-05-19 | End: 2025-06-18

## 2025-05-21 ENCOUNTER — OFFICE VISIT (OUTPATIENT)
Dept: PODIATRY | Facility: CLINIC | Age: 80
End: 2025-05-21
Payer: MEDICARE

## 2025-05-21 VITALS
SYSTOLIC BLOOD PRESSURE: 136 MMHG | HEART RATE: 99 BPM | WEIGHT: 153 LBS | DIASTOLIC BLOOD PRESSURE: 79 MMHG | OXYGEN SATURATION: 95 % | HEIGHT: 58 IN | BODY MASS INDEX: 32.12 KG/M2

## 2025-05-21 DIAGNOSIS — B35.1 ONYCHOMYCOSIS: ICD-10-CM

## 2025-05-21 DIAGNOSIS — M79.672 FOOT PAIN, BILATERAL: ICD-10-CM

## 2025-05-21 DIAGNOSIS — M20.12 VALGUS DEFORMITY OF BOTH GREAT TOES: Primary | ICD-10-CM

## 2025-05-21 DIAGNOSIS — M20.11 VALGUS DEFORMITY OF BOTH GREAT TOES: Primary | ICD-10-CM

## 2025-05-21 DIAGNOSIS — L60.0 ONYCHOCRYPTOSIS: ICD-10-CM

## 2025-05-21 DIAGNOSIS — M79.671 FOOT PAIN, BILATERAL: ICD-10-CM

## 2025-05-21 PROCEDURE — 1159F MED LIST DOCD IN RCRD: CPT | Performed by: PODIATRIST

## 2025-05-21 PROCEDURE — 11721 DEBRIDE NAIL 6 OR MORE: CPT | Performed by: PODIATRIST

## 2025-05-21 PROCEDURE — 1160F RVW MEDS BY RX/DR IN RCRD: CPT | Performed by: PODIATRIST

## 2025-05-21 NOTE — PROGRESS NOTES
[facility] - PODIATRY    Today's Date: 05/21/25    Patient Name: Erik Bhatia  MRN: [id number]  CSN: [id number]  PCP: Marco Antonio Leonard MD, last PCP visit: 8 April 2025  Referring Provider: No ref. provider found    SUBJECTIVE     Chief Complaint   Patient presents with    Right Foot - Follow-up, Nail Problem    Left Foot - Follow-up, Nail Problem     HPI: Erik Bhatia, a 80 y.o.female, comes presents to clinic with chief complaint of:    New, Established, New Problem: Established    Location: Toenails    Duration:  Greater than five years    Onset: Gradual    Nature: sore with palpation.    Stable, worsening, improving: recurring    Aggravating factors: Pain with shoe gear and ambulation.    Previous Treatment:  Debridement    Patient relates no medical changes since their last visit.     Patient denies any fevers, chills, nausea, vomiting, shortness of breathe, nor any other constitutional signs nor symptoms.    I have reviewed/confirmed previously documented HPI with no changes.     Past Medical History:   Diagnosis Date    ADHD (attention deficit hyperactivity disorder) about 2000    Allergic ?    Anemia ?    Ankle sprain Long time ago    Arthritis     Arthritis of back ?    Asthma     Bladder disorder     Bunion     Callus ?    Cataract     Chronic allergic rhinitis     Chronic UTI     Corns and callus     CTS (carpal tunnel syndrome) 2002    Had surgeries in 2003 and 204    GERD (gastroesophageal reflux disease)     Hammertoe     Hiatal hernia     Ingrown toenail     Limb pain     Limb swelling     Mixed hyperlipidemia 08/10/2022    Numbness in feet     Periarthritis of shoulder 2024    Sinusitis infections    SOB (shortness of breath)      Past Surgical History:   Procedure Laterality Date    AMPUTATION DIGIT Right 2/7/2025    Procedure: AMPUTATION DIGIT - Right second toe amputation via disarticulation; Plain language - Amputation of Right Second toe;  Surgeon: Rian Jo DPM;  " Location: [facility] MAIN OR;  Service: Podiatry;  Laterality: Right;    BREAST BIOPSY      CARPAL TUNNEL RELEASE      COLONOSCOPY   and before    ENDOSCOPY      HAND SURGERY      Carpal tunnel listed anove    TRIGGER FINGER RELEASE      TRIGGER POINT INJECTION      Bilateral thumb releases in      Family History   Problem Relation Age of Onset    Stomach cancer Father     Diabetes Father             Asthma Father     Cancer Father         stomach    Heart disease Mother     Diabetes Maternal Grandfather          long ago    Breast cancer Neg Hx     Ovarian cancer Neg Hx     Uterine cancer Neg Hx     Colon cancer Neg Hx      Social History     Socioeconomic History    Marital status:    Tobacco Use    Smoking status: Never     Passive exposure: Never    Smokeless tobacco: Never    Tobacco comments:     none   Vaping Use    Vaping status: Never Used   Substance and Sexual Activity    Alcohol use: Yes     Alcohol/week: 3.0 - 4.0 standard drinks of alcohol     Types: 3 - 4 Glasses of wine per week     Comment: Not every week    Drug use: Never    Sexual activity: Not Currently     Partners: Male     Birth control/protection: Post-menopausal     Comment: All birth control and sexual partner info from  past     Allergies   Allergen Reactions    Nitrofurantoin Irritability     \"MADE ME REALLY ANXIOUS\"      Current Outpatient Medications   Medication Sig Dispense Refill    albuterol sulfate  (90 Base) MCG/ACT inhaler Inhale 2 puffs Every 4 (Four) Hours As Needed for Wheezing. 18 g 1    Albuterol-Budesonide (Airsupra) 90-80 MCG/ACT aerosol Inhale 2 puffs Every 4 (Four) Hours As Needed (Shortness of breath, wheezing). 10.7 g 1    aluminum hydroxide-magnesium carbonate (Gaviscon)  MG/15ML suspension oral suspension Take 1 mL by mouth Daily As Needed.      cetirizine-pseudoephedrine (ZyrTEC-D) 5-120 MG per 12 hr tablet Take 2 tablets by mouth Daily. 180 tablet 1    Cholecalciferol " 125 MCG (5000 UT) tablet 1 tablet Daily.      Diclofenac Sodium (VOLTAREN) 1 % gel gel Apply 4 g topically to the appropriate area as directed 4 (Four) Times a Day As Needed (right shoulder pain). 150 g 1    estradiol (ESTRACE VAGINAL) 0.1 MG/GM vaginal cream Place 0.5 gm PV and massage 0.5 gm to vulva and vestibule at night 2x/week 42.5 g 3    fluticasone (Flonase) 50 MCG/ACT nasal spray 2 sprays into the nostril(s) as directed by provider Daily. 45 mL 3    lisdexamfetamine (Vyvanse) 50 MG capsule Take 1 capsule by mouth Every Morning for 30 days 30 capsule 0    magnesium, as, gluconate (MAGONATE) 500 (27 Mg) MG tablet Take 1 tablet by mouth Daily.      montelukast (SINGULAIR) 10 MG tablet Take 1 tablet by mouth Daily. 90 tablet 3    nystatin (MYCOSTATIN) 846702 UNIT/GM powder Apply  topically to the appropriate area as directed 2 (Two) Times a Day. 45 g 3    olopatadine (PATADAY) 0.2 % solution ophthalmic solution 1 drop.      omeprazole (priLOSEC) 20 MG capsule Take 1 capsule by mouth Daily. 90 capsule 3    potassium chloride (MICRO-K) 10 MEQ CR capsule Take 2 capsules by mouth Daily. 180 capsule 3    PreviDent 5000 Plus 1.1 % cream       silver sulfadiazine (SILVADENE, SSD) 1 % cream APPLY TOPICALLY TWICE A DAY TO THE APPROPRIATE AREA AS DIRECTED 25 g 5    traMADol (ULTRAM) 50 MG tablet Take 1 tablet by mouth Every 8 (Eight) Hours As Needed for Moderate Pain. 30 tablet 0    budesonide-formoterol (Symbicort) 160-4.5 MCG/ACT inhaler Inhale 2 puffs 2 (Two) Times a Day for 90 days. 30.6 g 3     No current facility-administered medications for this visit.     Review of Systems   Constitutional: Negative.    All other systems reviewed and are negative.      OBJECTIVE     Vitals:    05/21/25 1033   BP: 136/79   Pulse: 99   SpO2: 95%         Body mass index is 31.98 kg/m².    Lab Results   Component Value Date    GLUCOSE 87 04/07/2025    CALCIUM 9.4 04/07/2025     04/07/2025    K 4.1 04/07/2025    CO2 24.7  04/07/2025     04/07/2025    BUN 18 04/07/2025    CREATININE 0.73 04/07/2025    EGFRIFNONA 79 09/10/2021    BCR 24.7 04/07/2025    ANIONGAP 10.3 04/07/2025       Patient seen in no apparent distress.      PHYSICAL EXAM:     Foot/Ankle Exam    GENERAL  Appearance:  appears stated age and elderly  Orientation:  AAOx3  Affect:  appropriate  Gait:  unimpaired  Assistance:  independent  Right shoe gear: casual shoe  Left shoe gear: casual shoe    VASCULAR     Right Foot Vascularity   Normal vascular exam    Dorsalis pedis:  2+  Posterior tibial:  2+  Skin temperature:  warm  Edema grading:  None  CFT:  < 3 seconds  Pedal hair growth:  Present  Varicosities:  none     Left Foot Vascularity   Normal vascular exam    Dorsalis pedis:  2+  Posterior tibial:  2+  Skin temperature:  warm  Edema grading:  None  CFT:  < 3 seconds  Pedal hair growth:  Present  Varicosities:  none     NEUROLOGIC     Right Foot Neurologic   Normal sensation    Light touch sensation: normal  Vibratory sensation: normal  Hot/Cold sensation: normal  Protective Sensation using Lindsey-Ayush Monofilament:   Sites intact: 10  Sites tested: 10     Left Foot Neurologic   Normal sensation    Light touch sensation: normal  Vibratory sensation: normal  Hot/Cold sensation:  normal  Protective Sensation using Lindsey-Ayush Monofilament:   Sites intact: 10  Sites tested: 10    MUSCULOSKELETAL     Right Foot Musculoskeletal   Hammertoe:  Third toe, fourth toe and fifth toe  Hallux valgus: Yes       Left Foot Musculoskeletal   Hammertoe:  Second toe, third toe, fourth toe and fifth toe  Hallux valgus: Yes      MUSCLE STRENGTH     Right Foot Muscle Strength   Foot dorsiflexion:  4  Foot plantar flexion:  4  Foot inversion:  4  Foot eversion:  4     Left Foot Muscle Strength   Foot dorsiflexion:  4  Foot plantar flexion:  4  Foot inversion:  4  Foot eversion:  4    RANGE OF MOTION     Right Foot Range of Motion   Foot and ankle ROM within normal limits        Left Foot Range of Motion   Foot and ankle ROM within normal limits      DERMATOLOGIC      Right Foot Dermatologic   Skin  Positive for ulcer.   Nails  1.  Positive for elongated, onychomycosis, abnormal thickness, subungual debris and ingrown toenail.  2.  Absent.  3.  Positive for elongated, onychomycosis, abnormal thickness, subungual debris and ingrown toenail.  4.  Positive for elongated, onychomycosis, abnormal thickness, subungual debris and ingrown toenail.  5.  Positive for elongated, onychomycosis, abnormal thickness, subungual debris and ingrown toenail.     Left Foot Dermatologic   Skin  Left foot skin is intact.   Nails  1.  Positive for elongated, onychomycosis, abnormal thickness, subungual debris and ingrown toenail.  2.  Positive for elongated, onychomycosis, abnormal thickness, subungual debris and ingrown toenail.  3.  Positive for elongated, onychomycosis, abnormal thickness, subungual debris and ingrown toenail.  4.  Positive for elongated, onychomycosis, abnormally thick, subungual debris and ingrown toenail.  5.  Positive for elongated, onychomycosis, abnormally thick, subungual debris and ingrown toenail.    I have reexamined the patient the results are consistent with the previously documented exam.    ASSESSMENT/PLAN     Diagnoses and all orders for this visit:    1. 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 Foot pain, bilateral    3. Onychomycosis    4. Onychocryptosis    Comprehensive lower extremity examination and evaluation was performed.    Discussed findings and treatment plan including risks, benefits, and treatment options with patient in detail. Patient agreed with treatment plan.    Toenails 1, 3, 4, 5 on Right and 1, 2, 3, 4, 5 on Left were debrided with nail nippers then filed with a Dremel nail elisa.  Patient tolerated procedure well without complications.    An After Visit Summary was printed and given to the patient at discharge, including (if requested) any  available informative/educational handouts regarding diagnosis, treatment, or medications. All questions were answered to patient/family satisfaction. Should symptoms fail to improve or worsen they agree to call or return to clinic or to go to the Emergency Department. Discussed the importance of following up with any needed screening tests/labs/specialist appointments and any requested follow-up recommended by me today. Importance of maintaining follow-up discussed and patient accepts that missed appointments can delay diagnosis and potentially lead to worsening of conditions.    Return in about 9 weeks (around 7/23/2025) for Toenail Care., or sooner if acute issues arise.    I have reviewed the assessment and plan and verified the accuracy of it. No changes to assessment and plan since the information was documented. Rian Jo DPM 05/21/25     I have dictated this note utilizing Dragon Dictation.  Please note that portions of this note were completed with a voice recognition program.  Part of this note may be an electronic transcription/translation of spoken language to printed text using the Dragon Dictation System.      This document has been electronically signed by Rian Jo DPM on May 21, 2025 10:47 EDT

## 2025-06-23 DIAGNOSIS — F98.8 ATTENTION DEFICIT DISORDER (ADD) WITHOUT HYPERACTIVITY: ICD-10-CM

## 2025-06-23 RX ORDER — LISDEXAMFETAMINE DIMESYLATE 50 MG/1
50 CAPSULE ORAL EVERY MORNING
Qty: 30 CAPSULE | Refills: 0 | Status: SHIPPED | OUTPATIENT
Start: 2025-06-23 | End: 2025-07-23

## 2025-06-23 NOTE — TELEPHONE ENCOUNTER
Caller: Erik Bhatia    Relationship: Self    Best call back number:     [phone]     Requested Prescriptions:   Requested Prescriptions     Pending Prescriptions Disp Refills    lisdexamfetamine (Vyvanse) 50 MG capsule 30 capsule 0     Sig: Take 1 capsule by mouth Every Morning for 30 days        Pharmacy where request should be sent: [facility] [id number]  [facility] [place] [facility] [address] AT [facility] [address]  [phone] [facility] [phone] FX     Last office visit with prescribing clinician: 4/8/2025   Last telemedicine visit with prescribing clinician: Visit date not found   Next office visit with prescribing clinician: 10/8/2025     Additional details provided by patient: PATIENT IS COMPLETELY OUT OF MEDICATION.     Does the patient have less than a 3 day supply:  [x] Yes  [] No    Would you like a call back once the refill request has been completed: [] Yes [x] No    If the office needs to give you a call back, can they leave a voicemail: [] Yes [x] No    Rosanna Bustamante Rep   06/23/25 13:43 EDT

## 2025-07-28 DIAGNOSIS — F98.8 ATTENTION DEFICIT DISORDER (ADD) WITHOUT HYPERACTIVITY: ICD-10-CM

## 2025-07-28 NOTE — TELEPHONE ENCOUNTER
Caller: Erik Bhatia    Relationship: Self    Best call back number:   Telephone Information:   Mobile [phone]         Requested Prescriptions:   Requested Prescriptions     Pending Prescriptions Disp Refills    lisdexamfetamine (Vyvanse) 50 MG capsule 30 capsule 0     Sig: Take 1 capsule by mouth Every Morning for 30 days        Pharmacy where request should be sent: [facility] [id number] [facility] [place]  [address] AT [facility] [address] & [facility] [phone] [facility] [phone] FX     Last office visit with prescribing clinician: 4/8/2025   Last telemedicine visit with prescribing clinician: Visit date not found   Next office visit with prescribing clinician: 10/8/2025     Additional details provided by patient:        THE PATIENT IS OUT OF THIS MEDICATION     Does the patient have less than a 3 day supply:  [x] Yes  [] No    Would you like a call back once the refill request has been completed: [] Yes [x] No    If the office needs to give you a call back, can they leave a voicemail: [] Yes [x] No    Rosanna Desai Rep   07/28/25 15:57 EDT

## 2025-07-29 RX ORDER — LISDEXAMFETAMINE DIMESYLATE 50 MG/1
50 CAPSULE ORAL EVERY MORNING
Qty: 30 CAPSULE | Refills: 0 | Status: SHIPPED | OUTPATIENT
Start: 2025-07-29 | End: 2025-08-28

## 2025-08-11 RX ORDER — MONTELUKAST SODIUM 10 MG/1
10 TABLET ORAL DAILY
Qty: 90 TABLET | Refills: 1 | Status: SHIPPED | OUTPATIENT
Start: 2025-08-11

## 2025-08-11 RX ORDER — OMEPRAZOLE 20 MG/1
20 CAPSULE, DELAYED RELEASE ORAL DAILY
Qty: 90 CAPSULE | Refills: 1 | Status: SHIPPED | OUTPATIENT
Start: 2025-08-11

## 2025-08-13 ENCOUNTER — OFFICE VISIT (OUTPATIENT)
Dept: PODIATRY | Facility: CLINIC | Age: 80
End: 2025-08-13
Payer: MEDICARE

## 2025-08-13 VITALS
WEIGHT: 154 LBS | SYSTOLIC BLOOD PRESSURE: 124 MMHG | BODY MASS INDEX: 32.19 KG/M2 | DIASTOLIC BLOOD PRESSURE: 71 MMHG | OXYGEN SATURATION: 91 % | TEMPERATURE: 98 F | HEART RATE: 89 BPM

## 2025-08-13 DIAGNOSIS — B35.1 ONYCHOMYCOSIS: ICD-10-CM

## 2025-08-13 DIAGNOSIS — L60.0 ONYCHOCRYPTOSIS: Primary | ICD-10-CM

## 2025-08-13 DIAGNOSIS — M20.41 HAMMER TOES OF BOTH FEET: ICD-10-CM

## 2025-08-13 DIAGNOSIS — M20.42 HAMMER TOES OF BOTH FEET: ICD-10-CM

## 2025-08-13 DIAGNOSIS — M79.671 FOOT PAIN, BILATERAL: ICD-10-CM

## 2025-08-13 DIAGNOSIS — M20.12 VALGUS DEFORMITY OF BOTH GREAT TOES: ICD-10-CM

## 2025-08-13 DIAGNOSIS — M79.672 FOOT PAIN, BILATERAL: ICD-10-CM

## 2025-08-13 DIAGNOSIS — M20.11 VALGUS DEFORMITY OF BOTH GREAT TOES: ICD-10-CM

## 2025-08-28 DIAGNOSIS — F98.8 ATTENTION DEFICIT DISORDER (ADD) WITHOUT HYPERACTIVITY: ICD-10-CM

## 2025-08-28 RX ORDER — CETIRIZINE HYDROCHLORIDE, PSEUDOEPHEDRINE HYDROCHLORIDE 5; 120 MG/1; MG/1
2 TABLET, FILM COATED, EXTENDED RELEASE ORAL DAILY
Qty: 180 TABLET | Refills: 1 | Status: SHIPPED | OUTPATIENT
Start: 2025-08-28

## 2025-08-28 RX ORDER — LISDEXAMFETAMINE DIMESYLATE 50 MG/1
50 CAPSULE ORAL EVERY MORNING
Qty: 30 CAPSULE | Refills: 0 | Status: SHIPPED | OUTPATIENT
Start: 2025-08-28 | End: 2025-09-27

## (undated) DEVICE — SOL IRR NACL 0.9PCT BO 1000ML

## (undated) DEVICE — PENCL SMOKE/EVAC MEGADYNE TELESCP 10FT

## (undated) DEVICE — EXTREMITY-LF: Brand: MEDLINE INDUSTRIES, INC.

## (undated) DEVICE — GAUZE,SPONGE,4"X4",16PLY,STRL,LF,10/TRAY: Brand: MEDLINE

## (undated) DEVICE — BNDG ELAS CO-FLEX SLF ADHR 4IN5YD LF STRL

## (undated) DEVICE — STANDARD HYPODERMIC NEEDLE,POLYPROPYLENE HUB: Brand: MONOJECT

## (undated) DEVICE — DISPOSABLE TOURNIQUET CUFF SINGLE BLADDER, SINGLE PORT AND QUICK CONNECT CONNECTOR: Brand: COLOR CUFF

## (undated) DEVICE — SUT ETHLN 3-0 FS118IN 663H

## (undated) DEVICE — THE STERILE LIGHT HANDLE COVER IS USED WITH STERIS SURGICAL LIGHTING AND VISUALIZATION SYSTEMS.

## (undated) DEVICE — DRSNG WND GZ CURAD OIL EMULSION 3X3IN STRL

## (undated) DEVICE — BNDG ESMARK 4IN 12FT LF STRL BLU

## (undated) DEVICE — INTENDED FOR TISSUE SEPARATION, AND OTHER PROCEDURES THAT REQUIRE A SHARP SURGICAL BLADE TO PUNCTURE OR CUT.: Brand: BARD-PARKER ® CARBON RIB-BACK BLADES

## (undated) DEVICE — DRSNG PAD ABD 8X10IN STRL

## (undated) DEVICE — BANDAGE,GAUZE,BULKEE II,4.5"X4.1YD,STRL: Brand: MEDLINE

## (undated) DEVICE — GOWN,REINF,POLY,SIRUS,BRTH SLV,XLNG/XXL: Brand: MEDLINE

## (undated) DEVICE — SYR LL TP 10ML STRL

## (undated) DEVICE — GLV SURG SENSICARE PI ORTHO SZ9 LF STRL

## (undated) DEVICE — APPL CHLORAPREP HI/LITE 26ML ORNG